# Patient Record
Sex: MALE | Race: WHITE | NOT HISPANIC OR LATINO | Employment: FULL TIME | URBAN - METROPOLITAN AREA
[De-identification: names, ages, dates, MRNs, and addresses within clinical notes are randomized per-mention and may not be internally consistent; named-entity substitution may affect disease eponyms.]

---

## 2017-02-02 ENCOUNTER — GENERIC CONVERSION - ENCOUNTER (OUTPATIENT)
Dept: OTHER | Facility: OTHER | Age: 57
End: 2017-02-02

## 2017-02-14 ENCOUNTER — GENERIC CONVERSION - ENCOUNTER (OUTPATIENT)
Dept: OTHER | Facility: OTHER | Age: 57
End: 2017-02-14

## 2017-03-15 ENCOUNTER — GENERIC CONVERSION - ENCOUNTER (OUTPATIENT)
Dept: OTHER | Facility: OTHER | Age: 57
End: 2017-03-15

## 2017-03-20 ENCOUNTER — ALLSCRIPTS OFFICE VISIT (OUTPATIENT)
Dept: OTHER | Facility: OTHER | Age: 57
End: 2017-03-20

## 2017-03-20 ENCOUNTER — ALLSCRIPTS OFFICE VISIT (OUTPATIENT)
Dept: RADIOLOGY | Facility: CLINIC | Age: 57
End: 2017-03-20
Payer: COMMERCIAL

## 2017-03-20 DIAGNOSIS — M54.41 LOW BACK PAIN WITH RIGHT-SIDED SCIATICA: ICD-10-CM

## 2017-03-20 DIAGNOSIS — G89.4 CHRONIC PAIN SYNDROME: ICD-10-CM

## 2017-03-20 DIAGNOSIS — I10 ESSENTIAL (PRIMARY) HYPERTENSION: ICD-10-CM

## 2017-03-20 DIAGNOSIS — M54.42 LOW BACK PAIN WITH LEFT-SIDED SCIATICA: ICD-10-CM

## 2017-03-20 PROCEDURE — 77003 FLUOROGUIDE FOR SPINE INJECT: CPT | Performed by: ANESTHESIOLOGY

## 2017-03-29 ENCOUNTER — GENERIC CONVERSION - ENCOUNTER (OUTPATIENT)
Dept: OTHER | Facility: OTHER | Age: 57
End: 2017-03-29

## 2017-03-31 ENCOUNTER — APPOINTMENT (OUTPATIENT)
Dept: LAB | Facility: CLINIC | Age: 57
End: 2017-03-31
Payer: COMMERCIAL

## 2017-03-31 DIAGNOSIS — G89.4 CHRONIC PAIN SYNDROME: ICD-10-CM

## 2017-03-31 DIAGNOSIS — I10 ESSENTIAL (PRIMARY) HYPERTENSION: ICD-10-CM

## 2017-03-31 LAB
ALBUMIN SERPL BCP-MCNC: 3.9 G/DL (ref 3.5–5)
ALP SERPL-CCNC: 65 U/L (ref 46–116)
ALT SERPL W P-5'-P-CCNC: 33 U/L (ref 12–78)
ANION GAP SERPL CALCULATED.3IONS-SCNC: 6 MMOL/L (ref 4–13)
AST SERPL W P-5'-P-CCNC: 17 U/L (ref 5–45)
BASOPHILS # BLD AUTO: 0.03 THOUSANDS/ΜL (ref 0–0.1)
BASOPHILS NFR BLD AUTO: 0 % (ref 0–1)
BILIRUB SERPL-MCNC: 0.83 MG/DL (ref 0.2–1)
BILIRUB UR QL STRIP: NEGATIVE
BUN SERPL-MCNC: 18 MG/DL (ref 5–25)
CALCIUM SERPL-MCNC: 9 MG/DL (ref 8.3–10.1)
CHLORIDE SERPL-SCNC: 103 MMOL/L (ref 100–108)
CHOLEST SERPL-MCNC: 126 MG/DL (ref 50–200)
CLARITY UR: CLEAR
CO2 SERPL-SCNC: 30 MMOL/L (ref 21–32)
COLOR UR: YELLOW
CREAT SERPL-MCNC: 1.05 MG/DL (ref 0.6–1.3)
EOSINOPHIL # BLD AUTO: 0.08 THOUSAND/ΜL (ref 0–0.61)
EOSINOPHIL NFR BLD AUTO: 1 % (ref 0–6)
ERYTHROCYTE [DISTWIDTH] IN BLOOD BY AUTOMATED COUNT: 13.6 % (ref 11.6–15.1)
GFR SERPL CREATININE-BSD FRML MDRD: >60 ML/MIN/1.73SQ M
GLUCOSE P FAST SERPL-MCNC: 81 MG/DL (ref 65–99)
GLUCOSE UR STRIP-MCNC: NEGATIVE MG/DL
HCT VFR BLD AUTO: 47.3 % (ref 36.5–49.3)
HDLC SERPL-MCNC: 38 MG/DL (ref 40–60)
HGB BLD-MCNC: 15.1 G/DL (ref 12–17)
HGB UR QL STRIP.AUTO: NEGATIVE
KETONES UR STRIP-MCNC: NEGATIVE MG/DL
LDLC SERPL CALC-MCNC: 57 MG/DL (ref 0–100)
LDLC SERPL DIRECT ASSAY-MCNC: 80 MG/DL (ref 0–100)
LEUKOCYTE ESTERASE UR QL STRIP: NEGATIVE
LYMPHOCYTES # BLD AUTO: 1.78 THOUSANDS/ΜL (ref 0.6–4.47)
LYMPHOCYTES NFR BLD AUTO: 24 % (ref 14–44)
MCH RBC QN AUTO: 29 PG (ref 26.8–34.3)
MCHC RBC AUTO-ENTMCNC: 31.9 G/DL (ref 31.4–37.4)
MCV RBC AUTO: 91 FL (ref 82–98)
MONOCYTES # BLD AUTO: 0.65 THOUSAND/ΜL (ref 0.17–1.22)
MONOCYTES NFR BLD AUTO: 9 % (ref 4–12)
NEUTROPHILS # BLD AUTO: 4.92 THOUSANDS/ΜL (ref 1.85–7.62)
NEUTS SEG NFR BLD AUTO: 66 % (ref 43–75)
NITRITE UR QL STRIP: NEGATIVE
NRBC BLD AUTO-RTO: 0 /100 WBCS
PH UR STRIP.AUTO: 6.5 [PH] (ref 4.5–8)
PLATELET # BLD AUTO: 221 THOUSANDS/UL (ref 149–390)
PMV BLD AUTO: 11.1 FL (ref 8.9–12.7)
POTASSIUM SERPL-SCNC: 4.4 MMOL/L (ref 3.5–5.3)
PROT SERPL-MCNC: 7.2 G/DL (ref 6.4–8.2)
PROT UR STRIP-MCNC: NEGATIVE MG/DL
RBC # BLD AUTO: 5.2 MILLION/UL (ref 3.88–5.62)
SODIUM SERPL-SCNC: 139 MMOL/L (ref 136–145)
SP GR UR STRIP.AUTO: 1.02 (ref 1–1.03)
TRIGL SERPL-MCNC: 153 MG/DL
TSH SERPL DL<=0.05 MIU/L-ACNC: 2.07 UIU/ML (ref 0.36–3.74)
UROBILINOGEN UR QL STRIP.AUTO: 0.2 E.U./DL
WBC # BLD AUTO: 7.47 THOUSAND/UL (ref 4.31–10.16)

## 2017-03-31 PROCEDURE — 84443 ASSAY THYROID STIM HORMONE: CPT

## 2017-03-31 PROCEDURE — 86617 LYME DISEASE ANTIBODY: CPT

## 2017-03-31 PROCEDURE — 85025 COMPLETE CBC W/AUTO DIFF WBC: CPT

## 2017-03-31 PROCEDURE — 81003 URINALYSIS AUTO W/O SCOPE: CPT

## 2017-03-31 PROCEDURE — 80053 COMPREHEN METABOLIC PANEL: CPT

## 2017-03-31 PROCEDURE — 83721 ASSAY OF BLOOD LIPOPROTEIN: CPT

## 2017-03-31 PROCEDURE — 36415 COLL VENOUS BLD VENIPUNCTURE: CPT

## 2017-03-31 PROCEDURE — 80061 LIPID PANEL: CPT

## 2017-04-04 ENCOUNTER — GENERIC CONVERSION - ENCOUNTER (OUTPATIENT)
Dept: OTHER | Facility: OTHER | Age: 57
End: 2017-04-04

## 2017-04-04 LAB
B BURGDOR IGG PATRN SER IB-IMP: NEGATIVE
B BURGDOR IGM PATRN SER IB-IMP: POSITIVE
B BURGDOR18KD IGG SER QL IB: ABNORMAL
B BURGDOR23KD IGG SER QL IB: ABNORMAL
B BURGDOR23KD IGM SER QL IB: PRESENT
B BURGDOR28KD IGG SER QL IB: ABNORMAL
B BURGDOR30KD IGG SER QL IB: ABNORMAL
B BURGDOR39KD IGG SER QL IB: ABNORMAL
B BURGDOR39KD IGM SER QL IB: ABNORMAL
B BURGDOR41KD IGG SER QL IB: PRESENT
B BURGDOR41KD IGM SER QL IB: PRESENT
B BURGDOR45KD IGG SER QL IB: ABNORMAL
B BURGDOR58KD IGG SER QL IB: ABNORMAL
B BURGDOR66KD IGG SER QL IB: ABNORMAL
B BURGDOR93KD IGG SER QL IB: ABNORMAL

## 2017-04-05 ENCOUNTER — GENERIC CONVERSION - ENCOUNTER (OUTPATIENT)
Dept: OTHER | Facility: OTHER | Age: 57
End: 2017-04-05

## 2017-04-10 ENCOUNTER — ALLSCRIPTS OFFICE VISIT (OUTPATIENT)
Dept: OTHER | Facility: OTHER | Age: 57
End: 2017-04-10

## 2017-06-22 ENCOUNTER — APPOINTMENT (OUTPATIENT)
Dept: RADIOLOGY | Facility: CLINIC | Age: 57
End: 2017-06-22
Payer: COMMERCIAL

## 2017-06-22 ENCOUNTER — ALLSCRIPTS OFFICE VISIT (OUTPATIENT)
Dept: OTHER | Facility: OTHER | Age: 57
End: 2017-06-22

## 2017-06-22 DIAGNOSIS — M70.71 OTHER BURSITIS OF HIP, RIGHT HIP: ICD-10-CM

## 2017-06-22 DIAGNOSIS — M16.11 PRIMARY OSTEOARTHRITIS OF RIGHT HIP: ICD-10-CM

## 2017-06-22 DIAGNOSIS — M53.3 SACROCOCCYGEAL DISORDERS, NOT ELSEWHERE CLASSIFIED: ICD-10-CM

## 2017-06-22 DIAGNOSIS — M70.61 TROCHANTERIC BURSITIS OF RIGHT HIP: ICD-10-CM

## 2017-06-22 DIAGNOSIS — M25.551 PAIN IN RIGHT HIP: ICD-10-CM

## 2017-06-22 PROCEDURE — 73502 X-RAY EXAM HIP UNI 2-3 VIEWS: CPT

## 2017-06-22 PROCEDURE — 72200 X-RAY EXAM SI JOINTS: CPT

## 2017-06-23 ENCOUNTER — GENERIC CONVERSION - ENCOUNTER (OUTPATIENT)
Dept: OTHER | Facility: OTHER | Age: 57
End: 2017-06-23

## 2017-06-27 ENCOUNTER — GENERIC CONVERSION - ENCOUNTER (OUTPATIENT)
Dept: OTHER | Facility: OTHER | Age: 57
End: 2017-06-27

## 2017-07-05 ENCOUNTER — GENERIC CONVERSION - ENCOUNTER (OUTPATIENT)
Dept: OTHER | Facility: OTHER | Age: 57
End: 2017-07-05

## 2017-07-10 ENCOUNTER — ALLSCRIPTS OFFICE VISIT (OUTPATIENT)
Dept: OTHER | Facility: OTHER | Age: 57
End: 2017-07-10

## 2017-07-14 ENCOUNTER — ALLSCRIPTS OFFICE VISIT (OUTPATIENT)
Dept: OTHER | Facility: OTHER | Age: 57
End: 2017-07-14

## 2017-07-18 ENCOUNTER — ALLSCRIPTS OFFICE VISIT (OUTPATIENT)
Dept: OTHER | Facility: OTHER | Age: 57
End: 2017-07-18

## 2017-07-19 ENCOUNTER — GENERIC CONVERSION - ENCOUNTER (OUTPATIENT)
Dept: OTHER | Facility: OTHER | Age: 57
End: 2017-07-19

## 2017-07-27 ENCOUNTER — ALLSCRIPTS OFFICE VISIT (OUTPATIENT)
Dept: RADIOLOGY | Facility: CLINIC | Age: 57
End: 2017-07-27
Payer: COMMERCIAL

## 2017-08-03 ENCOUNTER — GENERIC CONVERSION - ENCOUNTER (OUTPATIENT)
Dept: OTHER | Facility: OTHER | Age: 57
End: 2017-08-03

## 2017-08-07 ENCOUNTER — ALLSCRIPTS OFFICE VISIT (OUTPATIENT)
Dept: OTHER | Facility: OTHER | Age: 57
End: 2017-08-07

## 2017-08-07 DIAGNOSIS — R19.7 DIARRHEA: ICD-10-CM

## 2017-08-07 DIAGNOSIS — R10.11 RIGHT UPPER QUADRANT PAIN: ICD-10-CM

## 2017-08-07 DIAGNOSIS — M16.11 PRIMARY OSTEOARTHRITIS OF RIGHT HIP: ICD-10-CM

## 2017-08-11 ENCOUNTER — ALLSCRIPTS OFFICE VISIT (OUTPATIENT)
Dept: OTHER | Facility: OTHER | Age: 57
End: 2017-08-11

## 2017-08-11 ENCOUNTER — TRANSCRIBE ORDERS (OUTPATIENT)
Dept: ADMINISTRATIVE | Facility: HOSPITAL | Age: 57
End: 2017-08-11

## 2017-08-11 DIAGNOSIS — R10.11 ABDOMINAL PAIN, RIGHT UPPER QUADRANT: Primary | ICD-10-CM

## 2017-08-15 ENCOUNTER — APPOINTMENT (OUTPATIENT)
Dept: ULTRASOUND IMAGING | Facility: CLINIC | Age: 57
End: 2017-08-15
Payer: COMMERCIAL

## 2017-08-15 ENCOUNTER — TRANSCRIBE ORDERS (OUTPATIENT)
Dept: SLEEP CENTER | Facility: HOSPITAL | Age: 57
End: 2017-08-15

## 2017-08-15 ENCOUNTER — TRANSCRIBE ORDERS (OUTPATIENT)
Dept: ADMINISTRATIVE | Facility: HOSPITAL | Age: 57
End: 2017-08-15

## 2017-08-15 ENCOUNTER — HOSPITAL ENCOUNTER (OUTPATIENT)
Dept: ULTRASOUND IMAGING | Facility: CLINIC | Age: 57
Discharge: HOME | End: 2017-08-15
Payer: COMMERCIAL

## 2017-08-15 ENCOUNTER — APPOINTMENT (OUTPATIENT)
Dept: LAB | Facility: CLINIC | Age: 57
End: 2017-08-15
Payer: COMMERCIAL

## 2017-08-15 ENCOUNTER — LAB CONVERSION - ENCOUNTER (OUTPATIENT)
Dept: OTHER | Facility: OTHER | Age: 57
End: 2017-08-15

## 2017-08-15 ENCOUNTER — HOSPITAL ENCOUNTER (OUTPATIENT)
Dept: SLEEP CENTER | Facility: HOSPITAL | Age: 57
Discharge: HOME | End: 2017-08-15
Attending: INTERNAL MEDICINE
Payer: COMMERCIAL

## 2017-08-15 DIAGNOSIS — Z12.5 SPECIAL SCREENING FOR MALIGNANT NEOPLASM OF PROSTATE: ICD-10-CM

## 2017-08-15 DIAGNOSIS — N39.43 POST-VOID DRIBBLING: ICD-10-CM

## 2017-08-15 DIAGNOSIS — N39.43 POST-VOID DRIBBLING: Primary | ICD-10-CM

## 2017-08-15 DIAGNOSIS — R10.11 RIGHT UPPER QUADRANT PAIN: ICD-10-CM

## 2017-08-15 DIAGNOSIS — G47.33 OSA (OBSTRUCTIVE SLEEP APNEA): ICD-10-CM

## 2017-08-15 DIAGNOSIS — G47.33 OSA (OBSTRUCTIVE SLEEP APNEA): Primary | ICD-10-CM

## 2017-08-15 LAB — PSA SERPL-MCNC: 1.2 NG/ML (ref 0–4)

## 2017-08-15 PROCEDURE — 84153 ASSAY OF PSA TOTAL: CPT

## 2017-08-15 PROCEDURE — 76705 ECHO EXAM OF ABDOMEN: CPT

## 2017-08-21 ENCOUNTER — GENERIC CONVERSION - ENCOUNTER (OUTPATIENT)
Dept: OTHER | Facility: OTHER | Age: 57
End: 2017-08-21

## 2017-08-29 ENCOUNTER — GENERIC CONVERSION - ENCOUNTER (OUTPATIENT)
Dept: OTHER | Facility: OTHER | Age: 57
End: 2017-08-29

## 2017-08-30 ENCOUNTER — ALLSCRIPTS OFFICE VISIT (OUTPATIENT)
Dept: OTHER | Facility: OTHER | Age: 57
End: 2017-08-30

## 2017-09-18 ENCOUNTER — TRANSCRIBE ORDERS (OUTPATIENT)
Dept: ADMINISTRATIVE | Facility: HOSPITAL | Age: 57
End: 2017-09-18

## 2017-09-18 ENCOUNTER — HOSPITAL ENCOUNTER (OUTPATIENT)
Dept: NUCLEAR MEDICINE | Facility: HOSPITAL | Age: 57
Discharge: HOME | End: 2017-09-18
Attending: SURGERY
Payer: COMMERCIAL

## 2017-09-18 VITALS — WEIGHT: 196 LBS | BODY MASS INDEX: 30.7 KG/M2

## 2017-09-18 DIAGNOSIS — R10.11 ABDOMINAL PAIN, RIGHT UPPER QUADRANT: ICD-10-CM

## 2017-09-18 PROCEDURE — A9537 TC99M MEBROFENIN: HCPCS

## 2017-09-18 PROCEDURE — 78227 HEPATOBIL SYST IMAGE W/DRUG: CPT

## 2017-09-18 RX ADMIN — SINCALIDE 1.8 MCG: 5 INJECTION, POWDER, LYOPHILIZED, FOR SOLUTION INTRAVENOUS at 08:56

## 2017-09-30 ENCOUNTER — HOSPITAL ENCOUNTER (EMERGENCY)
Facility: HOSPITAL | Age: 57
Discharge: HOME | End: 2017-09-30
Attending: EMERGENCY MEDICINE
Payer: COMMERCIAL

## 2017-09-30 VITALS
HEART RATE: 78 BPM | BODY MASS INDEX: 30.01 KG/M2 | WEIGHT: 198 LBS | SYSTOLIC BLOOD PRESSURE: 168 MMHG | OXYGEN SATURATION: 98 % | HEIGHT: 68 IN | RESPIRATION RATE: 20 BRPM | TEMPERATURE: 98.2 F | DIASTOLIC BLOOD PRESSURE: 104 MMHG

## 2017-09-30 DIAGNOSIS — L29.9 PRURITUS: ICD-10-CM

## 2017-09-30 DIAGNOSIS — R22.0 FACIAL SWELLING: ICD-10-CM

## 2017-09-30 DIAGNOSIS — K04.7 DENTAL ABSCESS: Primary | ICD-10-CM

## 2017-09-30 PROCEDURE — 99283 EMERGENCY DEPT VISIT LOW MDM: CPT

## 2017-09-30 PROCEDURE — 96372 THER/PROPH/DIAG INJ SC/IM: CPT

## 2017-09-30 RX ORDER — DIPHENHYDRAMINE HCL 25 MG
50 TABLET ORAL EVERY 6 HOURS PRN
Qty: 20 TABLET | Refills: 0 | Status: SHIPPED | OUTPATIENT
Start: 2017-09-30 | End: 2017-12-04

## 2017-09-30 RX ORDER — DIPHENHYDRAMINE HCL 25 MG
50 TABLET ORAL ONCE
Status: COMPLETED | OUTPATIENT
Start: 2017-09-30 | End: 2017-09-30

## 2017-09-30 RX ORDER — DOXYCYCLINE HYCLATE 100 MG/1
100 CAPSULE ORAL 2 TIMES DAILY
Qty: 20 CAPSULE | Refills: 0 | Status: SHIPPED | OUTPATIENT
Start: 2017-09-30 | End: 2017-10-10

## 2017-09-30 RX ORDER — LISINOPRIL 10 MG/1
TABLET ORAL
COMMUNITY
Start: 2017-03-20 | End: 2017-12-04

## 2017-09-30 RX ORDER — DOXYCYCLINE HYCLATE 100 MG/1
100 CAPSULE ORAL ONCE
Status: COMPLETED | OUTPATIENT
Start: 2017-09-30 | End: 2017-09-30

## 2017-09-30 RX ADMIN — DOXYCYCLINE HYCLATE 100 MG: 100 CAPSULE ORAL at 08:50

## 2017-09-30 RX ADMIN — DIPHENHYDRAMINE HCL 50 MG: 25 TABLET ORAL at 08:50

## 2017-09-30 RX ADMIN — DEXAMETHASONE SODIUM PHOSPHATE 10 MG: 10 INJECTION, SOLUTION INTRAMUSCULAR; INTRAVENOUS at 08:51

## 2017-09-30 NOTE — DISCHARGE INSTRUCTIONS
Dental Abscess   WHAT YOU NEED TO KNOW:   A dental abscess is a collection of pus in or around a tooth  A dental abscess is caused by bacteria  The bacteria usually enter the tooth when the enamel (outer part of the tooth) is damaged by tooth decay  Bacteria may also enter the tooth through a break or chip in the tooth, or a cut in the gum  Food particles that are stuck between the teeth for a long time may also lead to an abscess  DISCHARGE INSTRUCTIONS:   Return to the emergency department if:   · You have severe pain  · You have trouble breathing because of pain or swelling  Contact your healthcare provider if:   · Your symptoms get worse, even after treatment  · Your mouth is bleeding  · You cannot eat or drink because of pain or swelling  · Your abscess returns  · You have an injury that causes a crack in your tooth  · You have questions or concerns about your condition or care  Medicines: You may  need any of the following:  · Antibiotics  help treat a bacterial infection  · NSAIDs , such as ibuprofen, help decrease swelling, pain, and fever  This medicine is available with or without a doctor's order  NSAIDs can cause stomach bleeding or kidney problems in certain people  If you take blood thinner medicine, always ask your healthcare provider if NSAIDs are safe for you  Always read the medicine label and follow directions  · Acetaminophen  decreases pain and fever  It is available without a doctor's order  Ask how much to take and how often to take it  Follow directions  Read the labels of all other medicines you are using to see if they also contain acetaminophen, or ask your doctor or pharmacist  Acetaminophen can cause liver damage if not taken correctly  Do not use more than 4 grams (4,000 milligrams) total of acetaminophen in one day  · Prescription pain medicine  may be given  Ask your healthcare provider how to take this medicine safely   Some prescription pain medicines contain acetaminophen  Do not take other medicines that contain acetaminophen without talking to your healthcare provider  Too much acetaminophen may cause liver damage  Prescription pain medicine may cause constipation  Ask your healthcare provider how to prevent or treat constipation  · Take your medicine as directed  Contact your healthcare provider if you think your medicine is not helping or if you have side effects  Tell him of her if you are allergic to any medicine  Keep a list of the medicines, vitamins, and herbs you take  Include the amounts, and when and why you take them  Bring the list or the pill bottles to follow-up visits  Carry your medicine list with you in case of an emergency  Self-care:   · Rinse your mouth every 2 hours with salt water  This will help keep the area clean  · Gently brush your teeth twice a day with a soft tooth brush  This will help keep the area clean  · Eat soft foods as directed  Soft foods may cause less pain  Examples include applesauce, yogurt, and cooked pasta  Ask your healthcare provider how long to follow this instruction  · Apply a warm compress to your tooth or gum  Use a cotton ball or gauze soaked in warm water  Remove the compress in 10 minutes or when it becomes cool  Repeat 3 times a day  Prevent another abscess:   · Brush your teeth at least 2 times a day with fluoride toothpaste  · Use dental floss to clean between your teeth at least once a day  · Rinse your mouth with water or mouthwash after meals and snacks  · Chew sugarless gum after meals and snacks  · Limit foods that are sticky and high in sugar such as raisons  Also limit drinks high in sugar, such as soda  · See your dentist every 6 months for dental cleanings and oral exams  Follow up with your healthcare provider in 24 hours: Your healthcare provider will need to check your teeth and gums   Write down your questions so you remember to ask them during your visits  © 2017 [address] Information is for End User's use only and may not be sold, redistributed or otherwise used for commercial purposes  All illustrations and images included in CareNotes® are the copyrighted property of [employer]  or [employer]  The above information is an  only  It is not intended as medical advice for individual conditions or treatments  Talk to your doctor, nurse or pharmacist before following any medical regimen to see if it is safe and effective for you  Itchy Skin   WHAT YOU NEED TO KNOW:   Itchy skin may interfere with your daily tasks and sleep  Treatment is important because constant scratching can damage your skin and increase your risk of infection  DISCHARGE INSTRUCTIONS:   Medicines:  · Medicines  may help decrease itching or inflammation  Skin creams, such as steroid creams or anti-itch creams may also help  · Take your medicine as directed  Contact your healthcare provider if you think your medicine is not helping or if you have side effects  Tell him or her if you are allergic to any medicine  Keep a list of the medicines, vitamins, and herbs you take  Include the amounts, and when and why you take them  Bring the list or the pill bottles to follow-up visits  Carry your medicine list with you in case of an emergency  Follow up with your healthcare provider as directed:  Write down your questions so you remember to ask them during your visits  Manage itchy skin:   · Take short showers in warm water  Avoid using hot water for your showers  Use only a small amount of mild skin cleanser  · Apply moisturizer or cooling creams  after you bathe and throughout the day  · Use a cool mist humidifier  to moisten the air in your home and maintain a cool temperature  Cool, humid air can decrease skin dryness and itching  · Avoid allergens and skin irritants    Do not use perfume, fabric softener, or makeup that irritates your skin  Use a mild detergent to wash your clothes  Wear loose cotton clothes and use cotton sheets  Avoid wool  Contact your healthcare provider if:   · Your itching does not improve or gets worse  · Scratching has caused your skin to be red or swollen  · You have new symptoms such as weight loss, fatigue, changes in urination, or fever  · You have questions or concerns about your condition or care  © 2017 [address] [facility] Information is for End User's use only and may not be sold, redistributed or otherwise used for commercial purposes  All illustrations and images included in CareNotes® are the copyrighted property of [employer] M [employer]  or [employer]  The above information is an  only  It is not intended as medical advice for individual conditions or treatments  Talk to your doctor, nurse or pharmacist before following any medical regimen to see if it is safe and effective for you  General Allergic Reaction, Ambulatory Care   GENERAL INFORMATION:   A general allergic reaction  is your body's response to an allergen  Allergens include medicines, food, insect stings, animal dander, mold, latex, chemicals, and dust mites  Pollen from trees, grass, and weeds can also cause an allergic reaction  An allergic reaction can cause one or more symptoms  Seek immediate care for the following symptoms:   A skin rash, hives, swelling, or itching that gets worse    Trouble breathing, shortness of breath, wheezing, or coughing    Tightened or swollen throat, or your lips or tongue swell    Trouble swallowing or speaking    Dizziness, lightheadedness, fainting, or confusion    Nausea, vomiting, diarrhea, or abdominal cramps    Chest pain or tightness  Treatment for a general allergic reaction  may include medicines to relieve certain allergy symptoms such as itching, sneezing, and swelling  You may take them as a pill or use drops in your nose or eyes  Topical treatments may be given to put directly on your skin to help decrease itching or swelling  Manage allergic reactions:   Avoid the allergen  that you think may have caused your allergic reaction  Use cold compresses  on your skin or eyes if they were affected by the allergic reaction  Cold compresses may help to soothe your skin or eyes  Rinse your nasal passages  with a saline solution  Daily rinsing may help clear your nose of allergens  Do not smoke  Your allergy symptoms may decrease if you are not around smoke  If you smoke, it is never too late to quit  Ask your healthcare provider for information about how to stop if you need help quitting  Follow up with your healthcare provider as directed:  Write down your questions so you remember to ask them during your visits  CARE AGREEMENT:   You have the right to help plan your care  Learn about your health condition and how it may be treated  Discuss treatment options with your caregivers to decide what care you want to receive  You always have the right to refuse treatment  The above information is an  only  It is not intended as medical advice for individual conditions or treatments  Talk to your doctor, nurse or pharmacist before following any medical regimen to see if it is safe and effective for you  © 2014 [address] is for End User's use only and may not be sold, redistributed or otherwise used for commercial purposes  All illustrations and images included in CareNotes® are the copyrighted property of [employer]  or [employer]

## 2017-09-30 NOTE — ED PROVIDER NOTES
History  Chief Complaint   Patient presents with    Facial Swelling     Presents to ED with c/o right sided facial swelling since last night with associated "itching all over"  Denies any SOB, lip or tongue swelling  Denies any new foods or products  States that he is having dental work done to right upper area  This is a 79-year-old male who presents with swelling to the right facial area that started last evening he has been having some sensitivity and pain in the right upper dental area for the past few weeks he was seen by a dentist and told that he may have the start of an abscess and was referred to an endodontist but was not started on any antibiotic  He also states that he had some swelling to the left lower lip area last evening after biting into a vitamin  Denies any difficulty swallowing no shortness of breath nausea vomiting  He also has some itchiness under his arms and in the elbow areas  He has a previous history of Lyme disease he had a red spot in the  Right wrist area last evening that he thinks may have been an insect bite but it is resolved now at this time  He also had some chest pain yesterday briefly after swallowing a pill he does not have any chest pain at this time  He is currently on lisinopril but has not taken that medication today or yesterday  No previous angioedema episodes  History provided by:  Patient  Dental Pain   Location:  Upper  Upper teeth location:  2/RU 2nd molar  Quality:  Aching  Severity:  Moderate  Onset quality:  Gradual  Progression:  Waxing and waning  Chronicity:  Recurrent  Context: dental caries    Previous work-up:  Dental exam  Worsened by:  Cold food/drink  Associated symptoms: facial swelling    Associated symptoms: no difficulty swallowing        Prior to Admission Medications   Prescriptions Last Dose Informant Patient Reported?  Taking?   diclofenac sodium (VOLTAREN) 1 %   Yes No   Sig: Voltaren 1 % Transdermal Gel  APPLY TO RIGHT ELBOW, 2 GM OF GEL TO AFFECTED AREA 4 TIMES DAILY  DO NOT APPLY MORE THAN 8 GM DAILY TO ANY ONE AFFECTED JOINT  Quantity: 1;  Refills: 0       Georgiana Jose DO;  Started 27-Feb-2016  [id number] GM Tube   lisinopril (ZESTRIL) 10 mg tablet   Yes Yes   Sig: Take by mouth   methocarbamol (ROBAXIN) 750 mg tablet   No No   Sig: Take 1 tablet by mouth 4 (four) times a day as needed for muscle spasms   mometasone (NASONEX) 50 mcg/act nasal spray   Yes No   Sig: Nasonex 50 MCG/ACT Nasal Suspension  USE 2 SPRAYS IN EACH NOSTRIL ONCE DAILY   Quantity: 1;  Refills: 5       Georgiana Jose DO;  Started 9-Apr-2015  Trude Cords GM Inhaler   pantoprazole (PROTONIX) 40 mg tablet   Yes No   Sig: Protonix 40 MG Oral Tablet Delayed Release  Take 1 tablet daily   Quantity: 0;  Refills: 0       Elsie Carrillo ; Active      Facility-Administered Medications: None       Past Medical History:   Diagnosis Date    Chronic pain     GERD (gastroesophageal reflux disease)     Hypertension        History reviewed  No pertinent surgical history  History reviewed  No pertinent family history  I have reviewed and agree with the history as documented  Social History   Substance Use Topics    Smoking status: Never Smoker    Smokeless tobacco: Former User    Alcohol use No        Review of Systems   HENT: Positive for dental problem and facial swelling  Skin:        Itchiness       Physical Exam  ED Triage Vitals [09/30/17 0818]   Temperature Pulse Respirations Blood Pressure SpO2   98 2 °F (36 8 °C) 68 20 (!) 161/104 98 %      Temp Source Heart Rate Source Patient Position - Orthostatic VS BP Location FiO2 (%)   Tympanic Monitor Sitting Right arm --      Pain Score       No Pain           Physical Exam   Constitutional: He is oriented to person, place, and time  He appears well-developed and well-nourished  No distress     HENT:   Right Ear: External ear normal    Left Ear: External ear normal    Nose: Nose normal    Swelling over the right cheek area and tenderness to the right upper dental area with palpation no obvious fluctuant area there is some dental caries no tongue swelling or uvular swelling no stridor   Eyes: Conjunctivae and EOM are normal  Pupils are equal, round, and reactive to light  Neck: Normal range of motion  Neck supple  No tracheal deviation present  Cardiovascular: Normal rate, regular rhythm and intact distal pulses  Exam reveals no friction rub  No murmur heard  Pulmonary/Chest: Effort normal and breath sounds normal  No respiratory distress  He has no wheezes  He has no rales  Abdominal: Soft  Bowel sounds are normal  He exhibits no distension  There is no tenderness  There is no guarding  Musculoskeletal: Normal range of motion  He exhibits no edema  Neurological: He is alert and oriented to person, place, and time  No cranial nerve deficit  Skin: Skin is warm and dry  No rash noted  Psychiatric: He has a normal mood and affect  His behavior is normal  Thought content normal    Nursing note and vitals reviewed  ED Medications  Medications   dexamethasone (DECADRON) injection 10 mg (10 mg Intramuscular Given 9/30/17 0851)   diphenhydrAMINE (BENADRYL) tablet 50 mg (50 mg Oral Given 9/30/17 0850)   doxycycline hyclate (VIBRAMYCIN) capsule 100 mg (100 mg Oral Given 9/30/17 0850)       Diagnostic Studies  Labs Reviewed - No data to display    No orders to display       Procedures  Procedures      Phone Contacts  ED Phone Contact    ED Course  ED Course                                MDM  Number of Diagnoses or Management Options  Dental abscess:   Facial swelling:   Pruritus:   Diagnosis management comments:  Facial swelling seems clinically more likely dental abscess  Also generalized pruritus may be allergic reaction to a determine entity will treat with steroids and Benadryl   Also currently on lisinopril although not consistent with angioedema I did recommend that he follows up with his family doctor in 2 days to get recheck    CritCare Time    Disposition  Final diagnoses:   Dental abscess   Facial swelling   Pruritus     ED Disposition     ED Disposition Condition Comment    Discharge  [address] discharge to home/self care  Condition at discharge: Stable        Follow-up Information     Follow up With Specialties Details Why Contact Info    June Dance, DO Family Medicine In 2 days  [facility] ([id number])  [facility]  [phone]          also with oral surgeon as previously directed         Patient's Medications   Discharge Prescriptions    DIPHENHYDRAMINE (BENADRYL) 25 MG TABLET    Take 2 tablets by mouth every 6 (six) hours as needed for itching       Start Date: 9/30/2017 End Date: --       Order Dose: 50 mg       Quantity: 20 tablet    Refills: 0    DOXYCYCLINE HYCLATE (VIBRAMYCIN) 100 MG CAPSULE    Take 1 capsule by mouth 2 (two) times a day for 10 days       Start Date: 9/30/2017 End Date: 10/10/2017       Order Dose: 100 mg       Quantity: 20 capsule    Refills: 0     No discharge procedures on file      ED Provider  Electronically Signed by       Yousif Gordon DO  09/30/17 7963

## 2017-09-30 NOTE — ED NOTES
Patient reports hives and rash on left wrist from the hospital band  Small red area noted  Pt states that his itching is worse  Denies any SOB       Chetna Walker RN  09/30/17 4870

## 2017-09-30 NOTE — ED NOTES
No Rash noted to areas that patient states are itching  No redness or warmth noted to right face  Right upper gum line red and swollen, no drainage noted  Bridge work intact       Amol Aguero RN  09/30/17 1417

## 2017-10-03 ENCOUNTER — GENERIC CONVERSION - ENCOUNTER (OUTPATIENT)
Dept: OTHER | Facility: OTHER | Age: 57
End: 2017-10-03

## 2017-10-05 ENCOUNTER — ALLSCRIPTS OFFICE VISIT (OUTPATIENT)
Dept: OTHER | Facility: OTHER | Age: 57
End: 2017-10-05

## 2017-10-06 DIAGNOSIS — Z11.59 ENCOUNTER FOR SCREENING FOR OTHER VIRAL DISEASES (CODE): ICD-10-CM

## 2017-10-06 NOTE — PROGRESS NOTES
Assessment  1  Allergic angioedema, initial encounter (995 1) (T78 3XXA)   2  Essential hypertension (401 9) (I10)    Plan  Essential hypertension    · AmLODIPine Besylate 5 MG Oral Tablet; Take 1 tablet daily   · Follow-up visit in 3 weeks Evaluation and Treatment  Follow-up  Status: Complete  Done:  [id number]    Discussion/Summary    I believe that Cooper's symptoms were related to angioedema from the lisinopril  We will keep him off of this medication  I am going to start him on amlodipine to treat his hypertension  He will monitor his blood pressure at home will call with readings consistently greater than 140/90 or less than 100/70  He will follow up with his allergist as scheduled next week  We will see him back as scheduled  He will call or follow up in the emergency room with any worsening shortness of breath or swelling  Chief Complaint  ER follow up for facial swelling and welts on waist line and buttocks  The patient is here for an emergency room follow-up  History of Present Illness  Ramos Morris is a 61-year-old male who presents today for follow-up from an emergency room visit on September 30, 2017  He presented there initially with swelling of the right facial area as well as sensitivity and pain in the right upper dental area for few weeks  Who was also having swelling of the lower lip area  There was no chest pain or shortness of breath  It was felt that the facial swelling was more likely related to a dental abscess  He was also having generalized pruritus and hives which they treated with steroids and Benadryl  They did not feel his symptoms were consistent with angioedema but they did recommend that he follow up here  reports the day before he had some esophageal pain in the midsternal area  He developed the lip swelling and facial swelling after eating a gummy vitamin  swelling did go down with the prednisone and the antibiotic   He had stopped the lisinopril before going to the ER but did restart the Lisinopril and within a day or 2 he developed hives again  He did see his dentist and they pulled a tooth and he did see GI and they increase his to Protonix 40 mg BID  He is on clindamycin for the dental infection  GI is doing a repeat EGD next week  last episode of hive was on 10/02/2017 after eating peaches and yogurt  He restarted the Claritin  He stopped the lisinopril since 10/01/2017  seems to be triggered by food  patient denies any chest pain, shortness of breath, or palpitations  There is no edema  There are no headaches or visual changes  There is no lightheadedness, dizziness, or fainting spells  is no throat swelling and there is no shortness of breath  is seeing his allergist next week  is watching his diet and exercise  The patient presents for follow-up of essential hypertension  The patient states he has been doing well with his blood pressure control since the last visit  He has no comorbid illnesses  Symptoms: denies impaired vision,-denies dyspnea,-denies chest pain,-denies intermittent leg claudication,-denies lower extremity edema-and-denies   Associated symptoms include no headache,-no focal neurologic deficits-and-no memory loss  Home monitoring: The patient checks his blood pressure sporadically  Blood pressure control has been poor  Medications: the patient is adherent with his medication regimen -He denies medication side effects  Review of Systems    Constitutional: No fever or chills, feels well, no tiredness, no recent weight gain or weight loss  Eyes: No complaints of eye pain, no red eyes, no discharge from eyes, no itchy eyes  ENT: no complaints of earache, no hearing loss, no nosebleeds, no nasal discharge, no sore throat, no hoarseness  Cardiovascular: No complaints of slow heart rate, no fast heart rate, no chest pain, no palpitations, no leg claudication, no lower extremity     Respiratory: No complaints of shortness of breath, no wheezing, no cough, no SOB on exertion, no orthopnea or PND  Gastrointestinal: No complaints of abdominal pain, no constipation, no nausea or vomiting, no diarrhea or bloody stools  Genitourinary: No complaints of dysuria, no incontinence, no hesitancy, no nocturia, no genital lesion, no testicular pain  Musculoskeletal: No complaints of arthralgia, no myalgias, no joint swelling or stiffness, no limb pain or swelling  Integumentary: No complaints of skin rash or skin lesions, no itching, no skin wound, no dry skin  Neurological: No compliants of headache, no confusion, no convulsions, no numbness or tingling, no dizziness or fainting, no limb weakness, no difficulty walking  Psychiatric: Is not suicidal, no sleep disturbances, no anxiety or depression, no change in personality, no emotional problems  Endocrine: No complaints of proptosis, no hot flashes, no muscle weakness, no erectile dysfunction, no deepening of the voice, no feelings of weakness  Hematologic/Lymphatic: No complaints of swollen glands, no swollen glands in the neck, does not bleed easily, no easy bruising  Active Problems  1  Abdominal pain, RUQ (789 01) (R10 11)   2  Allergic rhinitis (477 9) (J30 9)   3  Cervical pain (723 1) (M54 2)   4  Chronic bilateral low back pain with bilateral sciatica (724 2,724 3,338 29)   (M54 42,M54 41,G89 29)   5  Chronic myofascial pain (729 1,338 29) (M79 1,G89 29)   6  Degenerative joint disease of knee, right (715 96) (M17 9)   7  Diarrhea (787 91) (R19 7)   8  Encounter for screening colonoscopy (V76 51) (Z12 11)   9  Enlarged lymph node in neck (785 6) (R59 0)   10  Esophageal reflux (530 81) (K21 9)   11  Essential hypertension (401 9) (I10)   12  Greater trochanteric bursitis of right hip (726 5) (M70 61)   13  Hemangioma of skin (228 01) (D18 01)   14  Herniated lumbar intervertebral disc (722 10) (M51 26)   15  Lumbar spondylolysis (738 4) (M43 06)   16   Multiple pigmented nevi (216 9) (D22 9) 17  Obstructive sleep apnea syndrome (327 23) (G47 33)   18  Overweight (278 02) (E66 3)   19  Pain syndrome, chronic (338 4) (G89 4)   20  Primary localized osteoarthritis of right hip (715 15) (M16 11)   21  Primary localized osteoarthritis of right knee (715 16) (M17 11)   22  Rectus diastasis (728 84) (M62 08)   23  Sebaceous cyst (706 2) (L72 3)   24  Umbilical hernia (042 3) (K42 9)   25  Umbilical hernia without obstruction or gangrene (553 1) (K42 9)    Past Medical History  1  History of Abdominal pain, RLQ (right lower quadrant) (789 03) (R10 31)   2  History of Acute bronchitis (466 0) (J20 9)   3  History of Acute maxillary sinusitis (461 0) (J01 00)   4  History of Dysfunction of both eustachian tubes (381 81) (H69 83)   5  History of Epicondylitis, lateral, right (726 32) (M77 11)   6  History of Esophagitis, reflux (530 11) (K21 0)   7  History of acute pharyngitis (V12 69) (Z87 09)   8  History of acute sinusitis (V12 69) (Z87 09)   9  History of chronic sinusitis (V12 69) (Z87 09)   10  History of Lyme disease (V12 09) (Z86 19)   11  History of paronychia of finger (V13 3) (Z87 2)   12  History of squamous cell carcinoma of skin (V10 83) (Z85 828)   13  History of upper respiratory infection (V12 09) (Z87 09)   14  History of viral warts (V12 09) (Z86 19)   15  History of Ingrowing toenail (703 0) (L60 0)   16  History of Lump of skin (782 2) (R22 9)   17  History of Neck Strain (847 0)   18  History of Pain of right sacroiliac joint (724 6) (M53 3)   19  History of Paronychia Of The Left First Toe (681 11)   20  History of Recurrent sinusitis (473 9) (J32 9)   21  History of Swelling of right knee joint (719 06) (M25 461)   22  History of Viral URI (465 9) (J06 9,B97 89)    The active problems and past medical history were reviewed and updated today  Surgical History  1  History of Hernia Repair   2  History of Knee Surgery   3   History of Knee Surgery    The surgical history was reviewed and updated today  Family History  Mother    1  Family history of malignant neoplasm (V16 9) (Z80 9)  Family History    2  Family history of Back Pain   3  Family history of Cancer   4  Family history of Hypertension (V17 49)    The family history was reviewed and updated today  Social History   · Alcohol Use (History)   · Always uses seat belt   · Currently working   ·    · Never A Smoker   · No drug use   · Occasional alcohol use   · Occupation   · Two children  The social history was reviewed and updated today  The social history was reviewed and is unchanged  Current Meds   1  Ciprofloxacin HCl TABS; patient reports taking a 300mg tablet every 6 hours daily for   dental produre; Therapy: (Recorded:05Oct2017) to Recorded   2  Claritin 10 MG Oral Capsule; Take one tablet daily; Therapy: 05WDQ9500 to (Jonna Apple) Recorded   3  Nasonex 50 MCG/ACT Nasal Suspension; USE 2 SPRAYS IN EACH NOSTRIL ONCE   DAILY; Therapy: 09Apr2015 to (Last Rx:20Oct2015)  Requested for: 20Oct2015 Ordered    Allergies  1  Lisinopril TABS   2  Penicillins    Vitals  Vital Signs    Recorded: 85HDH9975 10:27AM   Temperature 98 3 F, Tympanic   Heart Rate 80   Respiration 17   Systolic 375, RUE, Sitting   Diastolic 80, RUE, Sitting   Height 5 ft 8 in   Weight 194 lb    BMI Calculated 29 5   BSA Calculated 2 02     Physical Exam    Constitutional   General appearance: No acute distress, well appearing and well nourished  Ears, Nose, Mouth, and Throat   External inspection of ears and nose: Normal     Otoscopic examination: Tympanic membrance translucent with normal light reflex  Canals patent without erythema  Nasal mucosa, septum, and turbinates: Normal without edema or erythema  Oropharynx: Normal with no erythema, edema, exudate or lesions  Pulmonary   Respiratory effort: No increased work of breathing or signs of respiratory distress      Auscultation of lungs: Clear to auscultation, equal breath sounds bilaterally, no wheezes, no rales, no rhonci  Cardiovascular   Palpation of heart: Normal PMI, no thrills  Auscultation of heart: Normal rate and rhythm, normal S1 and S2, without murmurs  Examination of extremities for edema and/or varicosities: Normal     Carotid pulses: Normal     Abdomen   Abdomen: Non-tender, no masses  Liver and spleen: No hepatomegaly or splenomegaly  Musculoskeletal   Gait and station: Normal     Digits and nails: Normal without clubbing or cyanosis  Inspection/palpation of joints, bones, and muscles: Normal     Skin   Skin and subcutaneous tissue: Abnormal  -There is mild swelling over the right cheek  Health Management  History of Colon cancer screening   COLONOSCOPY; every 5 years; Last [id number];  Next Due: 48Aof9388; Near Due    Future Appointments    Date/Time Provider Specialty Site   10/18/2017 11:15 AM Heather Cohen MD Orthopedic Surgery [facility] INPATIENT REHABILITATION [facility] [address]   10/25/2017 01:00 PM Rocio Herron DO Family [facility]   10/30/2017 10:30 AM Rocio Herron DO Family Medicine [facility] [place], operated by [facility]     Signatures   Electronically signed by : Lexi Wade DO; Oct  5 2017  2:28PM EST                       (Author)

## 2017-10-09 ENCOUNTER — GENERIC CONVERSION - ENCOUNTER (OUTPATIENT)
Dept: OTHER | Facility: OTHER | Age: 57
End: 2017-10-09

## 2017-10-10 ENCOUNTER — GENERIC CONVERSION - ENCOUNTER (OUTPATIENT)
Dept: OTHER | Facility: OTHER | Age: 57
End: 2017-10-10

## 2017-10-14 ENCOUNTER — GENERIC CONVERSION - ENCOUNTER (OUTPATIENT)
Dept: OTHER | Facility: OTHER | Age: 57
End: 2017-10-14

## 2017-10-16 ENCOUNTER — APPOINTMENT (OUTPATIENT)
Dept: LAB | Facility: CLINIC | Age: 57
End: 2017-10-16
Payer: COMMERCIAL

## 2017-10-16 ENCOUNTER — GENERIC CONVERSION - ENCOUNTER (OUTPATIENT)
Dept: OTHER | Facility: OTHER | Age: 57
End: 2017-10-16

## 2017-10-16 ENCOUNTER — TRANSCRIBE ORDERS (OUTPATIENT)
Dept: ADMINISTRATIVE | Facility: HOSPITAL | Age: 57
End: 2017-10-16

## 2017-10-16 DIAGNOSIS — T78.3XXD CYCLIC EDEMA, SUBSEQUENT ENCOUNTER: ICD-10-CM

## 2017-10-16 DIAGNOSIS — T78.3XXD CYCLIC EDEMA, SUBSEQUENT ENCOUNTER: Primary | ICD-10-CM

## 2017-10-16 LAB
ALBUMIN SERPL BCP-MCNC: 4 G/DL (ref 3.5–5)
ALP SERPL-CCNC: 85 U/L (ref 46–116)
ALT SERPL W P-5'-P-CCNC: 45 U/L (ref 12–78)
ANION GAP SERPL CALCULATED.3IONS-SCNC: 7 MMOL/L (ref 4–13)
AST SERPL W P-5'-P-CCNC: 25 U/L (ref 5–45)
BASOPHILS # BLD AUTO: 0.03 THOUSANDS/ΜL (ref 0–0.1)
BASOPHILS NFR BLD AUTO: 0 % (ref 0–1)
BILIRUB SERPL-MCNC: 0.34 MG/DL (ref 0.2–1)
BUN SERPL-MCNC: 13 MG/DL (ref 5–25)
C3 SERPL-MCNC: 120 MG/DL (ref 90–180)
C4 SERPL-MCNC: 25 MG/DL (ref 10–40)
CALCIUM SERPL-MCNC: 8.4 MG/DL (ref 8.3–10.1)
CHLORIDE SERPL-SCNC: 107 MMOL/L (ref 100–108)
CO2 SERPL-SCNC: 26 MMOL/L (ref 21–32)
CREAT SERPL-MCNC: 1.07 MG/DL (ref 0.6–1.3)
EOSINOPHIL # BLD AUTO: 0.13 THOUSAND/ΜL (ref 0–0.61)
EOSINOPHIL NFR BLD AUTO: 2 % (ref 0–6)
ERYTHROCYTE [DISTWIDTH] IN BLOOD BY AUTOMATED COUNT: 13.1 % (ref 11.6–15.1)
ERYTHROCYTE [SEDIMENTATION RATE] IN BLOOD: 4 MM/HOUR (ref 0–10)
GFR SERPL CREATININE-BSD FRML MDRD: 78 ML/MIN/1.73SQ M
GLUCOSE SERPL-MCNC: 103 MG/DL (ref 65–140)
HCT VFR BLD AUTO: 45 % (ref 36.5–49.3)
HGB BLD-MCNC: 14.8 G/DL (ref 12–17)
LYMPHOCYTES # BLD AUTO: 1.54 THOUSANDS/ΜL (ref 0.6–4.47)
LYMPHOCYTES NFR BLD AUTO: 23 % (ref 14–44)
MCH RBC QN AUTO: 29.4 PG (ref 26.8–34.3)
MCHC RBC AUTO-ENTMCNC: 32.9 G/DL (ref 31.4–37.4)
MCV RBC AUTO: 90 FL (ref 82–98)
MONOCYTES # BLD AUTO: 0.59 THOUSAND/ΜL (ref 0.17–1.22)
MONOCYTES NFR BLD AUTO: 9 % (ref 4–12)
NEUTROPHILS # BLD AUTO: 4.37 THOUSANDS/ΜL (ref 1.85–7.62)
NEUTS SEG NFR BLD AUTO: 66 % (ref 43–75)
NRBC BLD AUTO-RTO: 0 /100 WBCS
PLATELET # BLD AUTO: 227 THOUSANDS/UL (ref 149–390)
PMV BLD AUTO: 12.1 FL (ref 8.9–12.7)
POTASSIUM SERPL-SCNC: 3.9 MMOL/L (ref 3.5–5.3)
PROT SERPL-MCNC: 7.4 G/DL (ref 6.4–8.2)
RBC # BLD AUTO: 5.03 MILLION/UL (ref 3.88–5.62)
SODIUM SERPL-SCNC: 140 MMOL/L (ref 136–145)
WBC # BLD AUTO: 6.67 THOUSAND/UL (ref 4.31–10.16)

## 2017-10-16 PROCEDURE — 86161 COMPLEMENT/FUNCTION ACTIVITY: CPT

## 2017-10-16 PROCEDURE — 85025 COMPLETE CBC W/AUTO DIFF WBC: CPT

## 2017-10-16 PROCEDURE — 86160 COMPLEMENT ANTIGEN: CPT

## 2017-10-16 PROCEDURE — 84165 PROTEIN E-PHORESIS SERUM: CPT

## 2017-10-16 PROCEDURE — 83520 IMMUNOASSAY QUANT NOS NONAB: CPT

## 2017-10-16 PROCEDURE — 85652 RBC SED RATE AUTOMATED: CPT

## 2017-10-16 PROCEDURE — 80053 COMPREHEN METABOLIC PANEL: CPT

## 2017-10-18 ENCOUNTER — ALLSCRIPTS OFFICE VISIT (OUTPATIENT)
Dept: OTHER | Facility: OTHER | Age: 57
End: 2017-10-18

## 2017-10-19 LAB
ALBUMIN SERPL ELPH-MCNC: 4.53 G/DL (ref 3.5–5)
ALBUMIN SERPL ELPH-MCNC: 63.8 % (ref 52–65)
ALPHA1 GLOB SERPL ELPH-MCNC: 0.31 G/DL (ref 0.1–0.4)
ALPHA1 GLOB SERPL ELPH-MCNC: 4.3 % (ref 2.5–5)
ALPHA2 GLOB SERPL ELPH-MCNC: 0.62 G/DL (ref 0.4–1.2)
ALPHA2 GLOB SERPL ELPH-MCNC: 8.7 % (ref 7–13)
BETA GLOB ABNORMAL SERPL ELPH-MCNC: 0.45 G/DL (ref 0.4–0.8)
BETA1 GLOB SERPL ELPH-MCNC: 6.3 % (ref 5–13)
BETA2 GLOB SERPL ELPH-MCNC: 5.5 % (ref 2–8)
BETA2+GAMMA GLOB SERPL ELPH-MCNC: 0.39 G/DL (ref 0.2–0.5)
C1INH SERPL-MCNC: 30 MG/DL (ref 21–39)
GAMMA GLOB ABNORMAL SERPL ELPH-MCNC: 0.81 G/DL (ref 0.5–1.6)
GAMMA GLOB SERPL ELPH-MCNC: 11.4 % (ref 12–22)
IGG/ALB SER: 1.76 {RATIO} (ref 1.1–1.8)
LABORATORY COMMENT REPORT: NORMAL
PROT PATTERN SERPL ELPH-IMP: ABNORMAL
PROT SERPL-MCNC: 7.1 G/DL (ref 6.4–8.2)

## 2017-10-19 NOTE — PROGRESS NOTES
Assessment  1  Primary localized osteoarthritis of right hip (715 15) (M16 11)   2  Primary localized osteoarthritis of right knee (715 16) (M17 11)   3  Greater trochanteric bursitis of right hip (726 5) (M70 61)    Plan  Greater trochanteric bursitis of right hip, Primary localized osteoarthritis of right hip,  Primary localized osteoarthritis of right knee    · Follow-up PRN Evaluation and Treatment  Follow-up  Status: Complete  Done:  [id number]    Discussion/Summary    Findings consistent with right hip osteoarthritis, improved and greater trochanteric bursitis - resolved  Discussed findings and treatment options with the patient  Recommend finish physical therapy and low-impact exercises  As far as his hip is concerned, as long as he continued to have good pain relief I would continue to observe his symptoms  He might have to have repeat injection in the future if his symptoms worsen  All patient's questions were answered to his satisfaction  This note is dictated using [employer] software  It may contains topographical errors, grammatical errors, improperly dictated words, background noise and other errors  Chief Complaint  Right hip pain      History of Present Illness  HPI: 53-year-old white male following up for right greater trochanteric bursitis and right hip osteoarthritis  He is doing well with his right hip  He was golfing all a few days ago and has no issue  He does occasionally get pain in the groin area and nighttime  He is not able to take NSAID due to allergic issue to Advil  He also had increase pain over the outer aspect of his knee after golfing  Pain did not start until after his gold rounds  He denies locking or given away symptoms  He is ambulating without assist device  Review of Systems    Constitutional: No fever or chills, feels well, no tiredness, no recent weight loss or weight gain  Eyes: No complaints of red eyes, no eyesight problems     ENT: no complaints of loss of hearing, no nosebleeds, no sore throat  Cardiovascular: No complaints of chest pain, no palpitations, no leg claudication or lower extremity edema  Respiratory: No complaints of shortness of breath, no wheezing, no cough  Gastrointestinal: No complaints of abdominal pain, no constipation, no nausea or vomiting, no diarrhea or bloody stools  Genitourinary: No complaints of dysuria or incontinence, no hesitancy, no nocturia  Musculoskeletal: as noted in HPI  Integumentary: No complaints of skin rash or lesion, no itching or dry skin, no skin wounds  Neurological: No complaints of headache, no confusion, no numbness or tingling, no dizziness  Psychiatric: No suicidal thoughts, no anxiety, no depression  Endocrine: No muscle weakness, no frequent urination, no excessive thirst, no feelings of weakness  ROS reviewed  Active Problems  1  Abdominal pain, RUQ (789 01) (R10 11)   2  Allergic angioedema, initial encounter (995 1) (T78 3XXA)   3  Allergic rhinitis (477 9) (J30 9)   4  Blood tests for routine general physical examination (V72 62) (Z00 00)   5  Cervical pain (723 1) (M54 2)   6  Chronic bilateral low back pain with bilateral sciatica (724 2,724 3,338 29)   (M54 42,M54 41,G89 29)   7  Chronic myofascial pain (729 1,338 29) (M79 1,G89 29)   8  Diarrhea (787 91) (R19 7)   9  Encounter for screening colonoscopy (V76 51) (Z12 11)   10  Enlarged lymph node in neck (785 6) (R59 0)   11  Esophageal reflux (530 81) (K21 9)   12  Essential hypertension (401 9) (I10)   13  Greater trochanteric bursitis of right hip (726 5) (M70 61)   14  Hemangioma of skin (228 01) (D18 01)   15  Herniated lumbar intervertebral disc (722 10) (M51 26)   16  Lumbar spondylolysis (738 4) (M43 06)   17  Multiple pigmented nevi (216 9) (D22 9)   18  Need for hepatitis C screening test (V73 89) (Z11 59)   19  Obstructive sleep apnea syndrome (327 23) (G47 33)   20  Overweight (278 02) (E66 3)   21   Pain syndrome, chronic (338 4) (G89 4)   22  Primary localized osteoarthritis of right hip (715 15) (M16 11)   23  Primary localized osteoarthritis of right knee (715 16) (M17 11)   24  Rectus diastasis (728 84) (M62 08)   25  Sebaceous cyst (706 2) (L72 3)   26  Umbilical hernia (517 6) (K42 9)   27   Umbilical hernia without obstruction or gangrene (553 1) (K42 9)    Past Medical History   · History of Abdominal pain, RLQ (right lower quadrant) (789 03) (R10 31)   · History of Acute bronchitis (466 0) (J20 9)   · History of Acute maxillary sinusitis (461 0) (J01 00)   · History of Degenerative joint disease of knee, right (715 96) (M17 9)   · History of Dysfunction of both eustachian tubes (381 81) (H69 83)   · History of Epicondylitis, lateral, right (726 32) (M77 11)   · History of Esophagitis, reflux (530 11) (K21 0)   · History of acute pharyngitis (V12 69) (Z87 09)   · History of acute sinusitis (V12 69) (Z87 09)   · History of chronic sinusitis (V12 69) (Z87 09)   · History of Lyme disease (V12 09) (Z86 19)   · History of paronychia of finger (V13 3) (Z87 2)   · History of squamous cell carcinoma of skin (V10 83) (Z85 828)   · History of upper respiratory infection (V12 09) (Z87 09)   · History of viral warts (V12 09) (Z86 19)   · History of Ingrowing toenail (703 0) (L60 0)   · History of Lump of skin (782 2) (R22 9)   · History of Neck Strain (847 0)   · History of Pain of right sacroiliac joint (724 6) (M53 3)   · History of Paronychia Of The Left First Toe (681 11)   · History of Recurrent sinusitis (473 9) (J32 9)   · History of Swelling of right knee joint (719 06) (M25 461)   · History of Viral URI (465 9) (J06 9,B97 89)    Surgical History   · History of Hernia Repair   · History of Knee Surgery   · History of Knee Surgery    Family History  Mother    · Family history of malignant neoplasm (V16 9) (Z80 9)  Family History    · Family history of Back Pain   · Family history of Cancer   · Family history of Hypertension (V17 49)    Social History   · Alcohol Use (History)   · Always uses seat belt   · Currently working   ·    · Never A Smoker   · No drug use   · Occasional alcohol use   · Occupation   · 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 [employer]  · Two children    Current Meds   1  AmLODIPine Besylate 5 MG Oral Tablet; Take 1 tablet daily; Therapy: 05IPA2199 to (Rob Germain)  Requested for: 22QPQ4805; Last   Rx:05Oct2017 Ordered   2  Ciprofloxacin HCl TABS; patient reports taking a 300mg tablet every 6 hours daily for   dental produre; Therapy: (Recorded:05Oct2017) to Recorded   3  Claritin 10 MG Oral Capsule; Take one tablet daily; Therapy: 72EAD7790 to (Corrie Lama) Recorded   4  Nasonex 50 MCG/ACT Nasal Suspension; USE 2 SPRAYS IN EACH NOSTRIL ONCE   DAILY; Therapy: 09Apr2015 to (Last Rx:[id number])  Requested for: 20Oct2015 Ordered    Allergies  1  Lisinopril TABS   2  Penicillins    Vitals   Recorded: 37PIS0932 11:24AM   Heart Rate 78   Systolic 502   Diastolic 82   Height 5 ft 8 in   Weight 193 lb    BMI Calculated 29 35   BSA Calculated 2 01     Physical Exam    Constitutional - General appearance: Normal    Musculoskeletal - Gait and station: Normal    Cardiovascular - Examination of extremities for edema and/or varicosities: Normal    Skin - Skin and subcutaneous tissue: Normal    Neurologic - Sensation: Normal -- Lower extremity peripheral neuro exam: Normal    Psychiatric - Orientation to person, place, and time: Normal -- Mood and affect: Normal    Eyes   Conjunctiva and lids: Normal     Right Hip: Appearance: no deformity-- and-- no swelling  Tenderness: not the anterior hip joint,-- not the gluteus minimus,-- not the greater trochanter and bursa-- and-- not the sacroiliac joint  ROM:  Full  Internal rotation: which was painless  External rotation: which was painless   Motor: Normal  Special Tests: negative NICHELLE test,-- negative FADDIR test,-- negative Yandel's test-- and-- negative Straight Leg Raise  Right Knee: Appearance: swelling (lateral aspect ), but-- no deformity-- and-- no effusion  Tenderness: diffuse anterolateral aspect, but-- not diffusely over the anteromedial aspect,-- not the pes anserine bursa,-- not the prepatellar bursa,-- not over the lateral joint line,-- not over the medial joint line,-- not diffusely over the anterior knee,-- not the undersurface of the patella-- and-- not the quadriceps tendon  ROM: Full  Motor: Normal  Special Test: negative patellofemoral apprehension test,-- negative medial Rene test,-- negative lateral Rene test,-- no laxity on Valgus Stress-- and-- no laxity on Varus Stress        Future Appointments    Date/Time Provider Specialty Site   10/25/2017 01:00 PM Nadja Knight DO [facility] [place]   10/30/2017 10:30 AM Nadja Knight DO [facility] [place]     Signatures   Electronically signed by : Karthik Boyd MD; Oct 18 2017 11:54AM EST                       (Author)

## 2017-10-20 LAB
C1INH ACT/NOR SERPL: 99 %MEAN NORMAL
LABORATORY COMMENT REPORT: NORMAL
LABORATORY COMMENT REPORT: NORMAL
TRYPTASE SERPL-MCNC: 4.5 UG/L (ref 2.2–13.2)

## 2017-10-27 ENCOUNTER — APPOINTMENT (OUTPATIENT)
Dept: LAB | Facility: CLINIC | Age: 57
End: 2017-10-27
Payer: COMMERCIAL

## 2017-10-27 DIAGNOSIS — Z11.59 ENCOUNTER FOR SCREENING FOR OTHER VIRAL DISEASES (CODE): ICD-10-CM

## 2017-10-27 LAB
BILIRUB UR QL STRIP: NEGATIVE
CHOLEST SERPL-MCNC: 137 MG/DL (ref 50–200)
CLARITY UR: CLEAR
COLOR UR: YELLOW
GLUCOSE UR STRIP-MCNC: NEGATIVE MG/DL
HCV AB SER QL: NORMAL
HDLC SERPL-MCNC: 38 MG/DL (ref 40–60)
HGB UR QL STRIP.AUTO: NEGATIVE
KETONES UR STRIP-MCNC: NEGATIVE MG/DL
LDLC SERPL CALC-MCNC: 70 MG/DL (ref 0–100)
LEUKOCYTE ESTERASE UR QL STRIP: NEGATIVE
NITRITE UR QL STRIP: NEGATIVE
PH UR STRIP.AUTO: 7 [PH] (ref 4.5–8)
PROT UR STRIP-MCNC: NEGATIVE MG/DL
SP GR UR STRIP.AUTO: 1.01 (ref 1–1.03)
TRIGL SERPL-MCNC: 146 MG/DL
TSH SERPL DL<=0.05 MIU/L-ACNC: 2.46 UIU/ML (ref 0.36–3.74)
UROBILINOGEN UR QL STRIP.AUTO: 0.2 E.U./DL

## 2017-10-27 PROCEDURE — 80061 LIPID PANEL: CPT

## 2017-10-27 PROCEDURE — 86803 HEPATITIS C AB TEST: CPT

## 2017-10-27 PROCEDURE — 81003 URINALYSIS AUTO W/O SCOPE: CPT

## 2017-10-27 PROCEDURE — 84443 ASSAY THYROID STIM HORMONE: CPT

## 2017-10-27 PROCEDURE — 36415 COLL VENOUS BLD VENIPUNCTURE: CPT

## 2017-10-30 ENCOUNTER — ALLSCRIPTS OFFICE VISIT (OUTPATIENT)
Dept: OTHER | Facility: OTHER | Age: 57
End: 2017-10-30

## 2017-11-08 ENCOUNTER — ALLSCRIPTS OFFICE VISIT (OUTPATIENT)
Dept: OTHER | Facility: OTHER | Age: 57
End: 2017-11-08

## 2017-11-10 ENCOUNTER — GENERIC CONVERSION - ENCOUNTER (OUTPATIENT)
Dept: OTHER | Facility: OTHER | Age: 57
End: 2017-11-10

## 2017-11-30 ENCOUNTER — GENERIC CONVERSION - ENCOUNTER (OUTPATIENT)
Dept: OTHER | Facility: OTHER | Age: 57
End: 2017-11-30

## 2017-12-01 ENCOUNTER — TRANSCRIBE ORDERS (OUTPATIENT)
Dept: ADMINISTRATIVE | Facility: HOSPITAL | Age: 57
End: 2017-12-01

## 2017-12-01 ENCOUNTER — APPOINTMENT (OUTPATIENT)
Dept: RADIOLOGY | Facility: CLINIC | Age: 57
End: 2017-12-01
Payer: COMMERCIAL

## 2017-12-01 ENCOUNTER — GENERIC CONVERSION - ENCOUNTER (OUTPATIENT)
Dept: OTHER | Facility: OTHER | Age: 57
End: 2017-12-01

## 2017-12-01 ENCOUNTER — APPOINTMENT (OUTPATIENT)
Dept: LAB | Facility: CLINIC | Age: 57
End: 2017-12-01
Payer: COMMERCIAL

## 2017-12-01 DIAGNOSIS — T78.3XXD: Primary | ICD-10-CM

## 2017-12-01 DIAGNOSIS — K42.9 UMBILICAL HERNIA WITHOUT OBSTRUCTION OR GANGRENE: ICD-10-CM

## 2017-12-01 DIAGNOSIS — Z01.812 PRE-OPERATIVE LABORATORY EXAMINATION: ICD-10-CM

## 2017-12-01 DIAGNOSIS — T78.3XXD: ICD-10-CM

## 2017-12-01 DIAGNOSIS — K42.9 UMBILICAL HERNIA WITHOUT OBSTRUCTION OR GANGRENE: Primary | ICD-10-CM

## 2017-12-01 DIAGNOSIS — Z01.811 PRE-OPERATIVE RESPIRATORY EXAMINATION: ICD-10-CM

## 2017-12-01 LAB
ANION GAP SERPL CALCULATED.3IONS-SCNC: 7 MMOL/L (ref 4–13)
BUN SERPL-MCNC: 15 MG/DL (ref 5–25)
CALCIUM SERPL-MCNC: 8.5 MG/DL (ref 8.3–10.1)
CHLORIDE SERPL-SCNC: 108 MMOL/L (ref 100–108)
CO2 SERPL-SCNC: 25 MMOL/L (ref 21–32)
CREAT SERPL-MCNC: 1.09 MG/DL (ref 0.6–1.3)
EST. AVERAGE GLUCOSE BLD GHB EST-MCNC: 111 MG/DL
GFR SERPL CREATININE-BSD FRML MDRD: 76 ML/MIN/1.73SQ M
GLUCOSE SERPL-MCNC: 109 MG/DL (ref 65–140)
HBA1C MFR BLD: 5.5 % (ref 4.2–6.3)
POTASSIUM SERPL-SCNC: 4.2 MMOL/L (ref 3.5–5.3)
SODIUM SERPL-SCNC: 140 MMOL/L (ref 136–145)

## 2017-12-01 PROCEDURE — 71020 HB CHEST X-RAY 2VW FRONTAL&LATL: CPT

## 2017-12-01 PROCEDURE — 86003 ALLG SPEC IGE CRUDE XTRC EA: CPT

## 2017-12-01 PROCEDURE — 83036 HEMOGLOBIN GLYCOSYLATED A1C: CPT

## 2017-12-01 PROCEDURE — 36415 COLL VENOUS BLD VENIPUNCTURE: CPT

## 2017-12-01 PROCEDURE — 80048 BASIC METABOLIC PNL TOTAL CA: CPT

## 2017-12-04 ENCOUNTER — GENERIC CONVERSION - ENCOUNTER (OUTPATIENT)
Dept: FAMILY MEDICINE CLINIC | Facility: CLINIC | Age: 57
End: 2017-12-04

## 2017-12-04 RX ORDER — MOMETASONE FUROATE 50 UG/1
2 SPRAY, METERED NASAL DAILY
COMMUNITY
End: 2021-05-13

## 2017-12-04 RX ORDER — PANTOPRAZOLE SODIUM 40 MG/1
40 TABLET, DELAYED RELEASE ORAL DAILY
COMMUNITY

## 2017-12-04 RX ORDER — AMLODIPINE BESYLATE 5 MG/1
1 TABLET ORAL DAILY
COMMUNITY
Start: 2017-10-05 | End: 2018-06-04 | Stop reason: SDUPTHER

## 2017-12-04 NOTE — PRE-PROCEDURE INSTRUCTIONS
The following information was developed to assist you to prepare for your operation  What do I need to do before coming to the hospital?  · Arrange for a responsible person to drive you to and from the hospital   · Arrange care for your children at home  Children are not allowed in the recovery area of the hospital   · Plan to wear clothing that is easy to put on and take off  If you are having shoulder surgery, wear a shirt that buttons or zippers in front  Bathing  · Shower the evening before and the morning of your surgery with antibacterial soap  Please refer to the Pre Op Showering Instructions for Surgery Patient Sheet  · Remove nail polish and all body piercing jewelry  · Do not shave any body part for at least 24 hours before surgery-this includes face, arm, legs and upper body  Food  · Nothing to eat or drink after midnight the night before your surgery  This includes candy and chewing gum  Exception: If your surgery is after 12:00 pm (noon), you may have clear liquids such as 7-Up, ginger ale, apple or cranberry juice, Jello-O, water, or clear broth until 8:00 am   · Do not drink mild or juice with pulp on the morning before surgery  · Do not drink alcohol 24 hours before surgery  · Do not smoke 12 hours before surgery  Medicine  · Follow instructions you are received from your surgeon about which medicines you may take on the day of surgery  · If instructed to take medicine on the morning of surgery, take pills with just a small sip of water  Call your prescribing doctor for specific instructions on what to do if you take insulin  What should I bring to the hospital?  Bring  · Crutches or a walker, if you have them, for foot or knee surgery  · A list of the daily medications, vitamins, minerals, herbals and nutritional supplements you take  Include the dosages of medicines and the time you take them each day    · Glasses, dentures or hearing aids  · Minimal clothing; you will be wearing hospital sleepwear  · Photo ID; required to verify your identity  · If you have a Living Will or Power of , bring a copy of the documents  (only if being admitted)  · If you have an ostomy, bring an extra pouch and any supplies you use  Do Not Bring  · Medicines or Inhalers  · Money, valuables or jewelry    What other information should I know about the day of surgery? · Notify your surgeon if you develop a cold, sore throat, cough, fever, rash or any other illness  · Report to the Ambulatory Surgical/Same Day Surgery Unit  You will be instructed to stop at Registration only if you have not been pre-registered  · Inform your  if they do not stay that they will be asked by the staff to leave a phone number where they can be reached  · Be available to be reached before surgery  In the even the operating room schedule changes, you may be asked to come in earlier or later than expected  It is important to tell your doctor and others involved in your health care if you are taking or have been taking any non-prescription drugs, vitamins, minerals, herbals or other nutritional supplements  Any of these may interact with some food or medicines and cause a reaction  Before your operation, you play an important role in decreasing your risk for infection by washing with special antiseptic soap  This is an effective way to reduce bacteria on the skin which may help to prevent infections at the surgical site  Please read the following directions in advance  2  In the week before your operation purchase a 4 ounce bottle of antiseptic soap containing chlorhexidine gluconate 4%  Some brand names include: Aplicare, Endure, and Hibiclens  The cost is usually less than $5 00  · For your convenience, the [address] [place] [address] carries the soap  · It may also be available at your doctor's office or pre-admission testing center, and at most retail pharmacies    · If you are allergic or sensitive to soaps containing chlorhexidine gluconate (CHG), please let your doctor know so another antiseptic soap can be suggested  · CHG antiseptic soap is for external use only  2      The day before your operation follow these directions carefully to get ready  · Place clean lines (sheets) on your bed; you should sleep on clean sheets after your evening shower  · Get clean towels and washcloths ready - you need enough for 2 showers  · Set aside clean underwear, pajamas, and clothing to wear after the shower  Reminders:  · DO NOT use any other soap or body rinse on your skin during or after the antiseptic showers  · DO NOT use lotion , powder, deodorant, or perfume/aftershave of any kind on your skin after your antiseptic shower  · DO NOT shave any body parts in the 24 hours/the day before your operation  · DO NOT get the antiseptic soap in your eyes, ears, nose, mouth, or vaginal area  3      You will need to shower the night before AND the morning of your Surgery  Shower 1:  · The evening before your operation, take the fist shower  · First, shampoo your hair with regular shampoo and rinse it completely before you use the anitseptic soap  After washing and rinsing your hair, rinse your body  · Next, use a clean wash cloth to apply the antiseptic soap and wash your body from the neck down to your toes using 1/2 bottle of the antiseptic soap  You will use the other 1/2 bottle for the second shower  · Clean the area where your incision will be; later this area well for about 2 minutes  · If you ar having head or neck surgery, wash areas with the antiseptic soap  · Rinse yourself completely with running water  · Use a clean towel to dry off  · Wear clean underwear and clothing/pajamas  Shower 2:  · The Morning of your operation, take the second shower following the same steps as Shower 1 using the second 1/2 of the bottle of antiseptic soap    · Use clean cloths and towels to was and dry yourself off  · Wear clean underwear and clothing  Pre-Surgery Instructions:   Medication Instructions    amLODIPine (NORVASC) 5 mg tablet Patient was instructed by Physician and understands   mometasone (NASONEX) 50 mcg/act nasal spray Patient was instructed by Physician and understands   pantoprazole (PROTONIX) 40 mg tablet Patient was instructed by Physician and understands

## 2017-12-07 ENCOUNTER — ALLSCRIPTS OFFICE VISIT (OUTPATIENT)
Dept: OTHER | Facility: OTHER | Age: 57
End: 2017-12-07

## 2017-12-07 LAB — PEACH IGE QN: <0.1 KU/L

## 2017-12-08 ENCOUNTER — GENERIC CONVERSION - ENCOUNTER (OUTPATIENT)
Dept: FAMILY MEDICINE CLINIC | Facility: CLINIC | Age: 57
End: 2017-12-08

## 2017-12-08 LAB — MISCELLANEOUS LAB TEST RESULT: NORMAL

## 2017-12-11 ENCOUNTER — GENERIC CONVERSION - ENCOUNTER (OUTPATIENT)
Dept: FAMILY MEDICINE CLINIC | Facility: CLINIC | Age: 57
End: 2017-12-11

## 2017-12-12 ENCOUNTER — HOSPITAL ENCOUNTER (OUTPATIENT)
Facility: HOSPITAL | Age: 57
Setting detail: OUTPATIENT SURGERY
Discharge: HOME | End: 2017-12-12
Attending: SURGERY | Admitting: SURGERY
Payer: COMMERCIAL

## 2017-12-12 ENCOUNTER — ANESTHESIA (OUTPATIENT)
Dept: PERIOP | Facility: HOSPITAL | Age: 57
End: 2017-12-12
Payer: COMMERCIAL

## 2017-12-12 ENCOUNTER — ANESTHESIA EVENT (OUTPATIENT)
Dept: PERIOP | Facility: HOSPITAL | Age: 57
End: 2017-12-12
Payer: COMMERCIAL

## 2017-12-12 VITALS
WEIGHT: 194 LBS | HEIGHT: 67 IN | OXYGEN SATURATION: 95 % | SYSTOLIC BLOOD PRESSURE: 131 MMHG | BODY MASS INDEX: 30.45 KG/M2 | RESPIRATION RATE: 16 BRPM | DIASTOLIC BLOOD PRESSURE: 80 MMHG | TEMPERATURE: 97.7 F | HEART RATE: 62 BPM

## 2017-12-12 PROBLEM — K42.9 UMBILICAL HERNIA WITHOUT OBSTRUCTION OR GANGRENE: Status: ACTIVE | Noted: 2017-12-12

## 2017-12-12 PROBLEM — K42.9 UMBILICAL HERNIA WITHOUT OBSTRUCTION OR GANGRENE: Status: RESOLVED | Noted: 2017-12-12 | Resolved: 2017-12-12

## 2017-12-12 PROCEDURE — C1781 MESH (IMPLANTABLE): HCPCS | Performed by: SURGERY

## 2017-12-12 DEVICE — VENTRALEX ST HERNIA PATCH
Type: IMPLANTABLE DEVICE | Site: UMBILICAL | Status: FUNCTIONAL
Brand: VENTRALEX ST HERNIA PATCH

## 2017-12-12 RX ORDER — PROPOFOL 10 MG/ML
INJECTION, EMULSION INTRAVENOUS CONTINUOUS PRN
Status: DISCONTINUED | OUTPATIENT
Start: 2017-12-12 | End: 2017-12-12 | Stop reason: SURG

## 2017-12-12 RX ORDER — FENTANYL CITRATE 50 UG/ML
INJECTION, SOLUTION INTRAMUSCULAR; INTRAVENOUS AS NEEDED
Status: DISCONTINUED | OUTPATIENT
Start: 2017-12-12 | End: 2017-12-12 | Stop reason: SURG

## 2017-12-12 RX ORDER — HYDROCODONE BITARTRATE AND ACETAMINOPHEN 5; 325 MG/1; MG/1
2 TABLET ORAL EVERY 4 HOURS PRN
Status: DISCONTINUED | OUTPATIENT
Start: 2017-12-12 | End: 2017-12-12 | Stop reason: HOSPADM

## 2017-12-12 RX ORDER — ONDANSETRON 2 MG/ML
4 INJECTION INTRAMUSCULAR; INTRAVENOUS EVERY 6 HOURS PRN
Status: DISCONTINUED | OUTPATIENT
Start: 2017-12-12 | End: 2017-12-12 | Stop reason: HOSPADM

## 2017-12-12 RX ORDER — DEXAMETHASONE SODIUM PHOSPHATE 4 MG/ML
INJECTION, SOLUTION INTRA-ARTICULAR; INTRALESIONAL; INTRAMUSCULAR; INTRAVENOUS; SOFT TISSUE AS NEEDED
Status: DISCONTINUED | OUTPATIENT
Start: 2017-12-12 | End: 2017-12-12 | Stop reason: SURG

## 2017-12-12 RX ORDER — SODIUM CHLORIDE, SODIUM LACTATE, POTASSIUM CHLORIDE, CALCIUM CHLORIDE 600; 310; 30; 20 MG/100ML; MG/100ML; MG/100ML; MG/100ML
125 INJECTION, SOLUTION INTRAVENOUS CONTINUOUS
Status: DISCONTINUED | OUTPATIENT
Start: 2017-12-12 | End: 2017-12-12 | Stop reason: HOSPADM

## 2017-12-12 RX ORDER — SCOLOPAMINE TRANSDERMAL SYSTEM 1 MG/1
1 PATCH, EXTENDED RELEASE TRANSDERMAL
Status: DISCONTINUED | OUTPATIENT
Start: 2017-12-12 | End: 2017-12-12 | Stop reason: HOSPADM

## 2017-12-12 RX ORDER — HYDROCODONE BITARTRATE AND ACETAMINOPHEN 5; 325 MG/1; MG/1
1-2 TABLET ORAL EVERY 4 HOURS PRN
Qty: 30 TABLET | Refills: 0 | Status: SHIPPED | OUTPATIENT
Start: 2017-12-12 | End: 2017-12-22

## 2017-12-12 RX ORDER — MIDAZOLAM HYDROCHLORIDE 1 MG/ML
INJECTION INTRAMUSCULAR; INTRAVENOUS AS NEEDED
Status: DISCONTINUED | OUTPATIENT
Start: 2017-12-12 | End: 2017-12-12 | Stop reason: SURG

## 2017-12-12 RX ORDER — ACETAMINOPHEN 325 MG/1
650 TABLET ORAL EVERY 6 HOURS PRN
Status: DISCONTINUED | OUTPATIENT
Start: 2017-12-12 | End: 2017-12-12 | Stop reason: HOSPADM

## 2017-12-12 RX ORDER — FENTANYL CITRATE/PF 50 MCG/ML
25 SYRINGE (ML) INJECTION
Status: DISCONTINUED | OUTPATIENT
Start: 2017-12-12 | End: 2017-12-12 | Stop reason: HOSPADM

## 2017-12-12 RX ADMIN — FENTANYL CITRATE 50 MCG: 50 INJECTION, SOLUTION INTRAMUSCULAR; INTRAVENOUS at 13:33

## 2017-12-12 RX ADMIN — HYDROCODONE BITARTRATE AND ACETAMINOPHEN 2 TABLET: 5; 325 TABLET ORAL at 15:45

## 2017-12-12 RX ADMIN — PROPOFOL 100 MCG/KG/MIN: 10 INJECTION, EMULSION INTRAVENOUS at 13:34

## 2017-12-12 RX ADMIN — SODIUM CHLORIDE, SODIUM LACTATE, POTASSIUM CHLORIDE, AND CALCIUM CHLORIDE 125 ML/HR: .6; .31; .03; .02 INJECTION, SOLUTION INTRAVENOUS at 11:20

## 2017-12-12 RX ADMIN — CEFAZOLIN SODIUM 2000 MG: 2 SOLUTION INTRAVENOUS at 13:38

## 2017-12-12 RX ADMIN — MIDAZOLAM HYDROCHLORIDE 2 MG: 1 INJECTION, SOLUTION INTRAMUSCULAR; INTRAVENOUS at 13:30

## 2017-12-12 RX ADMIN — DEXAMETHASONE SODIUM PHOSPHATE 4 MG: 4 INJECTION, SOLUTION INTRAMUSCULAR; INTRAVENOUS at 13:38

## 2017-12-12 RX ADMIN — SCOPALAMINE 1 PATCH: 1 PATCH, EXTENDED RELEASE TRANSDERMAL at 13:31

## 2017-12-12 RX ADMIN — METRONIDAZOLE 500 MG: 500 INJECTION, SOLUTION INTRAVENOUS at 13:30

## 2017-12-12 NOTE — DISCHARGE INSTRUCTIONS
[facility] Instructions      Dr Shelley MARROQUIN D     1  General: Edyta Orona will feel pulling sensations around the wound or funny aches and pains around the incisions  This is normal  Even minor surgery is a change in your body and this is your bodys way of reaction to it  If you have had abdominal surgery, it may help to support the incision with a small pillow or blanket for comfort when moving or coughing  2  Wound care:       Glue - Leave glue alone, it will fall off on its own, no need for an additional dressings    3  Water: You may shower over the wound, unless there are drain tubes left in place  Do not bathe or use a pool or hot tub until cleared by the physician  You may shower right over the staples, glue or Steri-Strips and rinse wound with soapy water but do not scrub incision pat dry when you are done  4  Activity: You may go up and down stairs, walk as much as you are comfortable, but walk at least 3 times each day  If you have had abdominal surgery, do not lift anything heavier than 15 pounds for at least 2 weeks, unless cleared by the doctor  5  Diet: You may resume a regular diet  If you had a same-day surgery or overnight stay surgery, you may wish to eat lightly for a few days: soups, crackers, and sandwiches  You may resume a regular diet when ready  6  Medications: Resume all of your previous medications, unless told otherwise by the doctor  Avoid aspirin or ibuprofen (Advil, Motrin, etc ) products for 2-3 days after the date of surgery  You may, at that time, began to take them again  Tylenol is always fine, unless you are taking any narcotic pain medication containing Tylenol (such as Percocet, Darvocet, Vicodin, or anything containing acetaminophen)  Do not take Tylenol if you're taking these medications  You do not need to take the narcotic pain medications unless you are having significant pain and discomfort      7  Driving: He will need someone to drive you home on the day of surgery  Do not drive or make any important decisions while on narcotic pain medication or 24 hours and after anesthesia or sedation for surgery  Generally, you may drive when your off all narcotic pain medications  8  Upset Stomach: You may take Maalox, Tums, or similar items for an upset stomach  If your narcotic pain medication causes an upset stomach, do not take it on an empty stomach  Try taking it with at least some crackers or toast      9  Constipation: Patients often experienced constipation after surgery  You may take over-the-counter medication for this, such as Metamucil, Senokot, Dulcolax, milk of magnesia, etc  You may take a suppository unless you have had anorectal surgery such as a procedure on your hemorrhoids  If you experience significant nausea or vomiting after abdominal surgery, call the office before trying any of these medications  10  Call the office: If you are experiencing any of the following, fevers above 101 5°, significant nausea or vomiting, if the wound develops drainage and/or is excessive redness around the wound, or if you have significant diarrhea or other worsening symptoms  11  Pain: You may be given a prescription for pain  This will be given to the hospital, the day of surgery  12  Sexual Activity: You may resume sexual activity when you feel ready and comfortable and your incision is sealed and healed without apparent infection risk  13   Follow-up with dr Kelly Cardenas in 2 weeks       [facility]  Phone: [phone]

## 2017-12-12 NOTE — OP NOTE
OPEN UMBILICAL HERNIA REPAIR WITH MESH  Postoperative Note  PATIENT NAME: Rosalina Ni  : 1960  MRN: [id number]  [facility] OR ROOM 02    Surgery Date:     Umbilical hernia without obstruction or gangrene [K42 9]    Post-Op Diagnosis Codes:     * Umbilical hernia without obstruction or gangrene [K42 9]    Procedure(s):  OPEN UMBILICAL HERNIA REPAIR WITH MESH    Surgeon(s) and Role:     * Иван Mari MD - Primary     * Chloe Najjar, PA-C - Assisting    The Physician Assistant was medically necessary for surgical safety the case including suturing, retraction, and hemostasis  Specimens:  * No specimens in log *    Estimated Blood Loss:   Minimal    Anesthesia Type:   IV Sedation with Anesthesia     Procedure: The patient was seen in the Holding Room  The risks, benefits, complications, treatment options, and expected outcomes were discussed with the patient  The possibilities of reaction to medication, pulmonary aspiration, perforation of viscus, bleeding, recurrent infection, the need for additional procedures, failure to diagnose a condition, and creating a complication requiring transfusion or operation were discussed with the patient  The patient concurred with the proposed plan, giving informed consent  The site of surgery properly noted/marked  The patient was taken to Operating Room  A Time Out was held and the above information confirmed  The patient was prepped and draped in a sterile fashion  An additional timeout was performed  An infraumbilical incision was made, dissection carried out to the hernia sac  Hernia sac was freed of its surrounding adhesions  The hernia sac was opened and its contents reduced  Excess hernia sac was excised  A ventralax mesh was placed into the defect and the patch was placed in a Intra-Peritoneal  postion and secured using interrupted 2-0 Prolene sutures in a circumferential fashion,  assuring there were no defects or gaps in the mesh  This was doubly checked to secure the mesh without any interrupting gaps or defects  The fascia was closed interrupted figure 2-0 Prolene  The subcutaneous tissues were closed using 3-0 Vicryl sutures and the skin closed using a 4-0 Monocryl subcuticular stitch  The wound was dressed, placing positive pressure in the umbilicus with a gauze pack  The wound was dressed with Histacryl  The patient tolerated the procedure well  Instrument, sponge, and needle counts were correct prior to closure and at the conclusion of the case  This text is generated with voice recognition software  There may be translation, syntax,  or grammatical errors  If you have any questions, please contact the dictating provider       Complications: None    Condition: Stable to PACU    SIGNATURE: Luis Miguel Cruz MD   DATE: 2017   TIME: 2:07 PMOPERATIVE REPORT  PATIENT NAME: Stephen Camp    :  1960  MRN: [id number]  Pt Location:  OR ROOM 02    SURGERY DATE: 2017    Surgeon(s) and Role:     * Luis Miguel Cruz MD - Primary     Riki Jimenez PA-C - Assisting    Preop Diagnosis:  Umbilical hernia without obstruction or gangrene [K42 9]    Post-Op Diagnosis Codes:     * Umbilical hernia without obstruction or gangrene [K42 9]    Procedure(s) (LRB):  OPEN UMBILICAL HERNIA REPAIR WITH MESH (N/A)    Specimen(s):  * No specimens in log *    Estimated Blood Loss:   Minimal    Drains:       Anesthesia Type:   IV Sedation with Anesthesia    Operative Indications:  Umbilical hernia without obstruction or gangrene [K42 9]      Operative Findings:  Umbilical hernia    Complications:   None    Procedure and Technique:     I was present for the entire procedure    Patient Disposition:  PACU     SIGNATURE: Luis Miguel Cruz MD  DATE: 2017  TIME: 2:07 PM

## 2017-12-12 NOTE — ANESTHESIA PREPROCEDURE EVALUATION
Review of Systems/Medical History  Patient summary reviewed  Chart reviewed  History of anesthetic complications PONV    Cardiovascular  EKG reviewed, Hypertension ,    Pulmonary  Sleep apnea CPAP, ,        GI/Hepatic    GERD well controlled,             Endo/Other  Arthritis     GYN       Hematology   Musculoskeletal       Neurology   Psychology           Physical Exam    Airway    Mallampati score: III         Dental   No notable dental hx     Cardiovascular  Cardiovascular exam normal    Pulmonary  Pulmonary exam normal     Other Findings        Anesthesia Plan  ASA Score- 2       Anesthesia Type- general and IV sedation with anesthesia with ASA Monitors  Additional Monitors:   Airway Plan:           Induction- intravenous  Informed Consent- Anesthetic plan and risks discussed with patient  I personally reviewed this patient with the CRNA  Discussed and agreed on the Anesthesia Plan with the CRNA  Yasmin Olivo

## 2017-12-12 NOTE — DISCHARGE SUMMARY
Discharge Summary - Shun Rubalcava 56 y o  male MRN: [id number]    Unit/Bed#: OR [facility] Encounter: [id number]      Pre-Op Diagnosis Codes:     * Umbilical hernia without obstruction or gangrene [K42 9]  Post-Op Diagnosis Codes:     * Umbilical hernia without obstruction or gangrene [K42 9]      Procedures Performed:  Open repair of umbilical hernia with mesh        See H and P for full details of admission and op note  Patient recovered well and was discharged home  Patient was seen and examined prior to discharge  Provisions for Follow-Up Care:  See after visit summary for information related to follow-up care and any pertinent home health orders  Disposition: Home, in stable condition  Planned Readmission: No    Discharge Medications:  See after visit summary for reconciled discharge medications provided to patient and family  Post op instructions reviewed with patient and/or family  Rx given for discharge  Pt  discharge in improved and good condition      Signature:   Nathalie Jimenez PA-C  Date: 12/12/2017 Time: 1:22 PM

## 2017-12-12 NOTE — INTERIM OP NOTE
OPEN UMBILICAL HERNIA REPAIR WITH MESH  Postoperative Note  PATIENT NAME: Danita Torres  : 1960  MRN: [id number]  [facility] OR ROOM 02    Surgery Date: 2017    Preop Diagnosis:  Umbilical hernia without obstruction or gangrene [K42 9]    Post-Op Diagnosis Codes:     * Umbilical hernia without obstruction or gangrene [K42 9]    Procedure(s) (LRB):  OPEN UMBILICAL HERNIA REPAIR WITH MESH (N/A)    Surgeon(s) and Role:     * Robin Delgadillo MD - Primary     * Debbie Jimenez PA-C - Assisting    Specimens:  * No specimens in log *    Estimated Blood Loss:   Minimal    Anesthesia Type:   IV Sedation with Anesthesia     Findings: as above  1 5 cm x 1 5 cm     Complications:   None    Hernia Surgery Operative Note    Name: Danita Torres    Gender: male    Age: 56 y o  Race:     BMI: Body mass index is 30 38 kg/m²      DIAGNOSIS: umbilical hernia    Diabetes Mellitis: No    Coronary Heart Disease: No    Cancer: No    Steroid Use: No    Tobacco use: No   Last used: never smoked       Alcohol use: Yes Moderate    Location of Hernia: umbilical  VEBYTJ:3 0 cm  Width:1 05 cm  Primary: Yes  Recurrent: No   Number of recurrences:    Access: Open    Component Separation: No    Mesh:   Yes -  Type: Synthetic   Ventrlex     Operative Time: 27 min             SIGNATURE: Debbie Jimenez PA-C   DATE: 2017   TIME: 2:17 PM

## 2018-01-04 ENCOUNTER — GENERIC CONVERSION - ENCOUNTER (OUTPATIENT)
Dept: OTHER | Facility: OTHER | Age: 58
End: 2018-01-04

## 2018-01-10 NOTE — MISCELLANEOUS
Message   Recorded as Task   Date: 03/29/2017 08:05 AM, Created By: Mao Jorge   Task Name: Follow Up   Assigned To: [facility]   Regarding Patient: Lexy Abraham, Status: Active   Comment:    Aneta Pan - 29 Mar 2017 8:05 AM     TASK CREATED  S/p LESI on 3/20/17 w/[facility] in [place]  RT L5/S1 TFESI scheduled for 4/3/17    Please call 3/27/17   Oriana Giron - 29 Mar 2017 10:40 AM     TASK EDITED  Msg C# for pt to cb   Blanca Oviedo - 29 Mar 2017 1:06 PM     TASK REPLIED TO: Previously Assigned To [facility] [place] [facility],Team  Patient lm with answering service asking for cb has a few questions re: his recent procedure  Contacted patient who stated his original call was returning our call about his recent injection  Patient stated the relief he experienced last x 2days and currently has a lot of pain right sided -groin/hip  Pain comes and goes scores 6/7  Patient mentioned that he usually feels better when going to PT and being "stretched out" Patient was inquiring if Dr Lynda Davis could provide him with a script for PT  Please advise  Patient can be reached @ [phone]  Chang Jefferson - 29 Mar 2017 1:27 PM     TASK EDITED   Chang Jefferson - 29 Mar 2017 2:19 PM     TASK EDITED  s/w pt, states he has no relief at this time s/p LESI on 3/20  Pt states he has gone for PT w/ [facility] w/ + relief  Pt states his last visit was ~ 1 week ago - no order for pt, pt states he goes on his own and pays oop  Pt is requesting an order from [facility]) for PT w/ [facility]  Advised pt to proceed w/ R L5, S1 tfesi on 4/3  confirmed R sided low back / leg pain  Reviewed pre procedure instructions; eat a light meal - npo 1 hour prior to procedure  Loose fitting clothing, , c/b if illness / abx start prior to procedure  pt denied blood thinning medication  Advised pt, anticipate [facility] will send an order for PT today  This office will call back if there is any issue or instruction w/ that order   pt verbalized understanding and appreciation  *PT w/ [facility] ok? Order please  London Yang - 29 Mar 2017 2:43 PM     TASK REPLIED TO: Previously Assigned To London Yang  in allsccripts   Chang Jefferson - 29 Mar 2017 2:48 PM     TASK EDITED  Faxed to [facility]  Pt aware        Active Problems    1  Allergic rhinitis (477 9) (J30 9)   2  Cervical pain (723 1) (M54 2)   3  Chronic bilateral low back pain with bilateral sciatica (724 2,724 3,338 29)   (M54 42,M54 41,G89 29)   4  Chronic myofascial pain (729 1,338 29) (M79 1,G89 29)   5  Chronic sinusitis (473 9) (J32 9)   6  Degenerative joint disease of knee, right (715 96) (M17 9)   7  Elevated blood pressure reading without diagnosis of hypertension (796 2) (R03 0)   8  Esophageal reflux (530 81) (K21 9)   9  Essential hypertension (401 9) (I10)   10  Herniated lumbar intervertebral disc (722 10) (M51 26)   11  Lumbar spondylolysis (738 4) (M43 06)   12  Obstructive sleep apnea syndrome (327 23) (G47 33)   13  Pain syndrome, chronic (338 4) (G89 4)   14  Primary localized osteoarthritis of right knee (715 16) (M17 11)   15  Swelling of right knee joint (719 06) (M25 461)   16  Wart (078 10) (B07 9)    Current Meds   1  Lisinopril 10 MG Oral Tablet; TAKE 1 TABLET DAILY; Therapy: [id number] to (Evaluate:[id number])  Requested for: 20Mar2017; Last   Rx:20Mar2017 Ordered   2  Nasonex 50 MCG/ACT Nasal Suspension (Mometasone Furoate); USE 2 SPRAYS IN   EACH NOSTRIL ONCE DAILY; Therapy: 27Fje5533 to (Last Rx:20Oct2015)  Requested for: 20Oct2015 Ordered   3  Protonix 40 MG Oral Tablet Delayed Release (Pantoprazole Sodium); Take 1 tablet daily   Recorded    Allergies    1   Penicillins    Signatures   Electronically signed by : Samy Dejesus, ; Mar 29 2017  2:49PM EST                       (Author)

## 2018-01-10 NOTE — MISCELLANEOUS
Message   Recorded as Task   Date: 07/05/2017 12:13 PM, Created By: Zahira Chairez   Task Name: Call Back   Assigned To: [facility] [place] [facility]   Regarding Patient: Keanu Ortega, Status: In Progress   Comment:    Blanca Skaggs - 05 Jul 2017 12:13 PM     TASK CREATED  [facility]- Patient called stating that he is currently in [place] and threw his back out   wants to be prescribed muscle relaxers for the pain  Stated he has been taking advil which is not working and he cannot move  Patient stated he will be there until 07/09/17  c/b [phone]   Akilah White - 05 Jul 2017 12:58 PM     TASK IN PROGRESS   Akilah White - 05 Jul 2017 12:59 PM     TASK EDITED  Attempted to return pt's call, voicemail is full and not accepting messages  Will need to try again later  Brenda Nix - 05 Jul 2017 1:00 PM     TASK EDITED  phone call from patient returning your call, please call patient at [phone]  Willow Leonard - 05 Jul 2017 1:22 PM     TASK EDITED    I spoke with pt, states he was doing well, bent over and when he came up had pain in his low back  States he had taken methocarbamol in the past  Asking if we could call into [facility] [phone]  Advised we will discuss with Garcia and ALEJANDRA   ************   Garcia Aguayo - 05 Jul 2017 3:04 PM     TASK REPLIED TO: Previously Assigned To Garcia Aguayo  Can call in Methocarbamol 500 mg TID PRN for pain, disp #45 with 1 refill  Beyond that he will need to come in for an OV to be re-evaluated  Akilah White - 05 Jul 2017 3:45 PM     TASK IN PROGRESS   Akilah White - 05 Jul 2017 3:50 PM     TASK EDITED  Called in as instructted  Pt aware and confirmed sovs 7/14/17  Active Problems    1  Allergic rhinitis (477 9) (J30 9)   2  Bursitis of right hip (726 5) (M70 71)   3  Cervical pain (723 1) (M54 2)   4   Chronic bilateral low back pain with bilateral sciatica (724 2,724 3,338 29) (M54 42,M54 41,G89 29)   5  Chronic myofascial pain (729 1,338 29) (M79 1,G89 29)   6  Chronic sinusitis (473 9) (J32 9)   7  Degenerative joint disease of knee, right (715 96) (M17 9)   8  Esophageal reflux (530 81) (K21 9)   9  Essential hypertension (401 9) (I10)   10  Herniated lumbar intervertebral disc (722 10) (M51 26)   11  Lumbar spondylolysis (738 4) (M43 06)   12  Lyme disease (088 81) (A69 20)   13  Obstructive sleep apnea syndrome (327 23) (G47 33)   14  Pain of right sacroiliac joint (724 6) (M53 3)   15  Pain syndrome, chronic (338 4) (G89 4)   16  Primary localized osteoarthritis of right knee (715 16) (M17 11)   17  Right hip pain (719 45) (M25 551)   18  Swelling of right knee joint (719 06) (M25 461)   19  Wart (078 10) (B07 9)    Current Meds   1  Lisinopril 10 MG Oral Tablet; TAKE 1 TABLET DAILY; Therapy: 42MBB1978 to (Evaluate:18Nmh9688)  Requested for: 20Mar2017; Last   Rx:20Mar2017 Ordered   2  Meloxicam 7 5 MG Oral Tablet; Take 1 tablet twice daily; Therapy: 48ZCU6143 to (Radha Kamlesh)  Requested for: 76KTH2115; Last   Rx:65Rrc5310 Ordered   3  Methocarbamol 500 MG Oral Tablet; take one tab tid prn pain; Therapy: 09GTE8260 to Recorded   4  Nasonex 50 MCG/ACT Nasal Suspension (Mometasone Furoate); USE 2 SPRAYS IN   EACH NOSTRIL ONCE DAILY; Therapy: 99Rfk3976 to (Last Rx:19Vin3581)  Requested for: 20Oct2015 Ordered   5  Protonix 40 MG Oral Tablet Delayed Release (Pantoprazole Sodium); Take 1 tablet daily   Recorded    Allergies    1   Penicillins    Signatures   Electronically signed by : Rufino Barriga RN; Jul 5 2017  3:52PM EST                       (Author)

## 2018-01-11 ENCOUNTER — ALLSCRIPTS OFFICE VISIT (OUTPATIENT)
Dept: OTHER | Facility: OTHER | Age: 58
End: 2018-01-11

## 2018-01-11 NOTE — MISCELLANEOUS
Message   Recorded as Task   Date: 12/19/2016 02:27 PM, Created By: Mulu Her   Task Name: Miscellaneous   Assigned To: [facility] [place] [facility]   Regarding Patient: Rian Cunningham, Status: In Progress   Comment:    Karo Funes - 19 Dec 2016 2:27 PM     TASK CREATED  pt l/m on  line, upon reading last office not pt was to call with update on his prednisone and antbx use  Rizwan Glass Rizwan Glass pt would like to proceed with an injection but needs more clinical review  Narcisa Mercado - 19 Dec 2016 2:43 PM     TASK EDITED  Attempted to call the pt  to verify and left a detailed mom in regards to the previous task  Narcisa Mercado - 19 Dec 2016 2:43 PM     TASK IN PROGRESS        Active Problems    1  Acute maxillary sinusitis (461 0) (J01 00)   2  Allergic rhinitis (477 9) (J30 9)   3  Cervical pain (723 1) (M54 2)   4  Chronic bilateral low back pain with bilateral sciatica (724 2,724 3,338 29)   (M54 42,M54 41,G89 29)   5  Chronic myofascial pain (729 1,338 29) (M79 1,G89 29)   6  Chronic sinusitis (473 9) (J32 9)   7  Degenerative joint disease of knee, right (715 96) (M17 9)   8  Elevated blood pressure reading without diagnosis of hypertension (796 2) (R03 0)   9  Epicondylitis, lateral, right (726 32) (M77 11)   10  Esophageal reflux (530 81) (K21 9)   11  Herniated lumbar intervertebral disc (722 10) (M51 26)   12  Lumbar spondylolysis (738 4) (M43 06)   13  Obstructive sleep apnea syndrome (327 23) (G47 33)   14  Pain syndrome, chronic (338 4) (G89 4)   15  Primary localized osteoarthritis of right knee (715 16) (M17 11)   16  Recurrent sinusitis (473 9) (J32 9)   17  Swelling of right knee joint (719 06) (M25 461)   18  Wart (078 10) (B07 9)    Current Meds   1  Biaxin TABS (Clarithromycin) Recorded   2  Nasonex 50 MCG/ACT Nasal Suspension (Mometasone Furoate); USE 2 SPRAYS IN   EACH NOSTRIL ONCE DAILY; Therapy: 09Apr2015 to (Last Rx:20Oct2015)  Requested for: 20Oct2015 Ordered   3   PredniSONE 10 MG Oral Tablet Recorded   4  Protonix 40 MG Oral Tablet Delayed Release (Pantoprazole Sodium); Take 1 tablet daily   Recorded    Allergies    1   Penicillins    Signatures   Electronically signed by : Bonnie Souza, ; Dec 23 2016 12:13PM EST                       (Author)

## 2018-01-11 NOTE — MISCELLANEOUS
Message   Recorded as Task   Date: 07/28/2017 10:17 AM, Created By: Bismark Panda   Task Name: Follow Up   Assigned To: [facility] [place] [facility]   Regarding Patient: Denilson Espinoza, Status: Active   CommentTomy Fuse - 28 Jul 2017 10:17 AM     TASK CREATED  S/P RT HIP JNT INJ on 07/27/2017 w/[facility] [place]  No f/u scheduled        Please call on 08/03/2017   Bismark Panda - 47 Aug 2017 10:10 AM     TASK EDITED  Patient states he is doing good  Patient states he got 80-90% relief in the groin area after the injection  He still has some pain on the outside of his RT hip  Patient does not have a f/u scheduled  London Yang - 03 Aug 2017 10:24 AM     TASK REPLIED TO: Previously Assigned To [facility] [place] [facility]  Does he want to schedule a greater trochanteric bursa injection? Chang Jefferson - 03 Aug 2017 11:44 AM     TASK EDITED  s/w pt, advised of above  Pt stated that he feels good and is continuing w/ PT  Will cb if he feels pain is returning  Active Problems    1  Allergic rhinitis (477 9) (J30 9)   2  Cervical pain (723 1) (M54 2)   3  Chronic bilateral low back pain with bilateral sciatica (724 2,724 3,338 29)   (M54 42,M54 41,G89 29)   4  Chronic myofascial pain (729 1,338 29) (M79 1,G89 29)   5  Degenerative joint disease of knee, right (715 96) (M17 9)   6  Esophageal reflux (530 81) (K21 9)   7  Essential hypertension (401 9) (I10)   8  Greater trochanteric bursitis of right hip (726 5) (M70 61)   9  Herniated lumbar intervertebral disc (722 10) (M51 26)   10  Lumbar spondylolysis (738 4) (M43 06)   11  Obstructive sleep apnea syndrome (327 23) (G47 33)   12  Pain syndrome, chronic (338 4) (G89 4)   13  Primary localized osteoarthritis of right hip (715 15) (M16 11)   14  Primary localized osteoarthritis of right knee (715 16) (M17 11)    Current Meds   1  Lisinopril 10 MG Oral Tablet; TAKE 1 TABLET DAILY;    Therapy: 07VYP2808 to (Evaluate:14Jan2018)  Requested for: 96XQO2980; Last Rx: 60QKT1007 Ordered   2  Meloxicam 7 5 MG Oral Tablet; Take 1 tablet twice daily; Therapy: 55MWL5737 to (Leeann Catalan)  Requested for: 07EWB2648; Last   Rx:22Jun2017 Ordered   3  Methocarbamol 500 MG Oral Tablet; take one tab tid prn pain; Therapy: 60EVZ1879 to Recorded   4  Nasonex 50 MCG/ACT Nasal Suspension (Mometasone Furoate); USE 2 SPRAYS IN   EACH NOSTRIL ONCE DAILY; Therapy: 09Apr2015 to (Last Rx:20Oct2015)  Requested for: 20Oct2015 Ordered   5  Protonix 40 MG Oral Tablet Delayed Release (Pantoprazole Sodium); Take 1 tablet daily   Recorded    Allergies    1   Penicillins    Signatures   Electronically signed by : Brit Stanford, ; Aug  3 2017 11:44AM EST                       (Author)

## 2018-01-12 ENCOUNTER — GENERIC CONVERSION - ENCOUNTER (OUTPATIENT)
Dept: OTHER | Facility: OTHER | Age: 58
End: 2018-01-12

## 2018-01-12 VITALS
DIASTOLIC BLOOD PRESSURE: 78 MMHG | HEIGHT: 68 IN | WEIGHT: 197 LBS | BODY MASS INDEX: 29.86 KG/M2 | HEART RATE: 78 BPM | SYSTOLIC BLOOD PRESSURE: 116 MMHG

## 2018-01-12 VITALS
HEART RATE: 62 BPM | DIASTOLIC BLOOD PRESSURE: 82 MMHG | SYSTOLIC BLOOD PRESSURE: 130 MMHG | RESPIRATION RATE: 16 BRPM | BODY MASS INDEX: 30.25 KG/M2 | WEIGHT: 199.63 LBS | HEIGHT: 68 IN | TEMPERATURE: 98.2 F

## 2018-01-12 VITALS
SYSTOLIC BLOOD PRESSURE: 120 MMHG | TEMPERATURE: 98 F | BODY MASS INDEX: 30.46 KG/M2 | WEIGHT: 201 LBS | HEIGHT: 68 IN | HEART RATE: 58 BPM | DIASTOLIC BLOOD PRESSURE: 72 MMHG

## 2018-01-12 NOTE — MISCELLANEOUS
Message   Recorded as Task   Date: 06/20/2017 03:07 PM, Created By: Velma Sal   Task Name: Miscellaneous   Assigned To: [facility],Team   Regarding Patient: Katey Montgomery, Status: In Progress   Comment:    Karo Funes - 20 Jun 2017 3:07 PM     TASK CREATED  Caller: Self; [phone] (Home); [phone] x,,,,, (Work)  pt c/o rt groin pain/hip pain  walking with a limp, denies back pain, mostly rear end pain and hip bone area going down rt leg, pt states feels like a sciatica pain  pt last ov 11/2016   last inj 3/2017 a [facility], was scheduled for a TFESI on 4/2017 but cx'd due to lymes disease  advised pt will discuss w/provider and rtc  office f/u vs inj? Garcia Aguayo - 20 Jun 2017 4:22 PM     TASK REPLIED TO: Previously Assigned To Garcia Aguayo  Schedule pt for a f/u OV with the next available OV with me or DR Renu Dixon - 21 Jun 2017 8:48 AM     TASK REASSIGNED: Previously Assigned To [employer] - 23 Jun 2017 11:15 AM     TASK IN PROGRESS   RonnyWillow - 23 Jun 2017 11:17 AM     TASK EDITED  PT IS SCHEDULED FOR 7/14/17 AT 1        Active Problems    1  Allergic rhinitis (477 9) (J30 9)   2  Bursitis of right hip (726 5) (M70 71)   3  Cervical pain (723 1) (M54 2)   4  Chronic bilateral low back pain with bilateral sciatica (724 2,724 3,338 29)   (M54 42,M54 41,G89 29)   5  Chronic myofascial pain (729 1,338 29) (M79 1,G89 29)   6  Chronic sinusitis (473 9) (J32 9)   7  Degenerative joint disease of knee, right (715 96) (M17 9)   8  Esophageal reflux (530 81) (K21 9)   9  Essential hypertension (401 9) (I10)   10  Herniated lumbar intervertebral disc (722 10) (M51 26)   11  Lumbar spondylolysis (738 4) (M43 06)   12  Lyme disease (088 81) (A69 20)   13  Obstructive sleep apnea syndrome (327 23) (G47 33)   14  Pain of right sacroiliac joint (724 6) (M53 3)   15  Pain syndrome, chronic (338 4) (G89 4)   16   Primary localized osteoarthritis of right knee (715 16) (M17 11)   17  Right hip pain (719 45) (M25 551)   18  Swelling of right knee joint (719 06) (M25 461)   19  Wart (078 10) (B07 9)    Current Meds   1  Lisinopril 10 MG Oral Tablet; TAKE 1 TABLET DAILY; Therapy: 06EHD9373 to (Evaluate:56Orx1600)  Requested for: 20Mar2017; Last   Rx:20Mar2017 Ordered   2  Meloxicam 7 5 MG Oral Tablet; Take 1 tablet twice daily; Therapy: 51BLQ5956 to (Alda Bermudian)  Requested for: 35URJ7637; Last   Rx:55Pho7321 Ordered   3  Nasonex 50 MCG/ACT Nasal Suspension (Mometasone Furoate); USE 2 SPRAYS IN   EACH NOSTRIL ONCE DAILY; Therapy: 13Ccn1491 to (Last Rx:20Oct2015)  Requested for: 20Oct2015 Ordered   4  Protonix 40 MG Oral Tablet Delayed Release (Pantoprazole Sodium); Take 1 tablet daily   Recorded    Allergies    1  Penicillins    Signatures   Electronically signed by :  Radha Holcomb, ; Jun 23 2017 11:18AM EST                       (Author)

## 2018-01-12 NOTE — MISCELLANEOUS
Message   Recorded as Task   Date: 06/26/2017 05:25 PM, Created By: Ayden Miller   Task Name: Go to Result   Assigned To: Lei Araya   Regarding Patient: Jeannette Mathur, Status: In Progress   Comment:    Fina Scott - 26 Jun 2017 5:25 PM     TASK CREATED  Please let the patient know the x-rays of his hips demonstrated moderate arthritis  The x-ray of his back demonstrated mild degenerative changes  I would recommend considering a follow-up with orthopedics such as [facility] for possible injections of his hip  Chun Eloisa - 26 Jun 2017 6:40 PM     TASK REASSIGNED: Previously Assigned To Donato Kirkland - 27 Jun 2017 8:12 AM     TASK EDITED  Left message on home number to call office back  Johny,April - 27 Jun 2017 8:18 AM     TASK IN PROGRESS   Johny,April - 27 Jun 2017 8:26 AM     TASK EDITED  patient was informed and advised  states he was driving and was unable to take the phone number but will call our office back or look up the number later  Active Problems    1  Allergic rhinitis (477 9) (J30 9)   2  Bursitis of right hip (726 5) (M70 71)   3  Cervical pain (723 1) (M54 2)   4  Chronic bilateral low back pain with bilateral sciatica (724 2,724 3,338 29)   (M54 42,M54 41,G89 29)   5  Chronic myofascial pain (729 1,338 29) (M79 1,G89 29)   6  Chronic sinusitis (473 9) (J32 9)   7  Degenerative joint disease of knee, right (715 96) (M17 9)   8  Esophageal reflux (530 81) (K21 9)   9  Essential hypertension (401 9) (I10)   10  Herniated lumbar intervertebral disc (722 10) (M51 26)   11  Lumbar spondylolysis (738 4) (M43 06)   12  Lyme disease (088 81) (A69 20)   13  Obstructive sleep apnea syndrome (327 23) (G47 33)   14  Pain of right sacroiliac joint (724 6) (M53 3)   15  Pain syndrome, chronic (338 4) (G89 4)   16  Primary localized osteoarthritis of right knee (715 16) (M17 11)   17  Right hip pain (719 45) (M25 551)   18   Swelling of right knee joint (719 06) (M26 461)   19  Wart (075 10) (B07 9)    Current Meds   1  Lisinopril 10 MG Oral Tablet; TAKE 1 TABLET DAILY; Therapy: 95OTM0585 to (Evaluate:36Qon5757)  Requested for: 20Mar2017; Last   Rx:20Mar2017 Ordered   2  Meloxicam 7 5 MG Oral Tablet; Take 1 tablet twice daily; Therapy: 34XVB8874 to (Gallito Carrasco)  Requested for: 87ZEG8429; Last   Rx:42Aah5872 Ordered   3  Nasonex 50 MCG/ACT Nasal Suspension (Mometasone Furoate); USE 2 SPRAYS IN   EACH NOSTRIL ONCE DAILY; Therapy: 25Ixk0531 to (Last Rx:84Vjo2885)  Requested for: 20Oct2015 Ordered   4  Protonix 40 MG Oral Tablet Delayed Release (Pantoprazole Sodium); Take 1 tablet daily   Recorded    Allergies    1   Penicillins    Signatures   Electronically signed by : Donovan Isaacs, ; Jun 27 2017  8:26AM EST                       (Author)

## 2018-01-12 NOTE — MISCELLANEOUS
Message  Patient called the office at 3:43 PM to report that he thought he could be having a reaction to his flu shot that he received this morning during his office visit  He claims he has anterior throat and neck pain that makes chewing difficult  He has no chest pain, difficulty breathing, hives or skin reaction at this time  He was advised in light of recent angioedema to Lisinopril earlier this month , he should be evaluated at the ER to determine cause of his pain - cardiac ? allergic reaction ? muscular spasm from his neck ? He will consider ER and monitor his symptoms  Advised call our office if he has additional questions but ER is recommended  DJB      Active Problems    1  Allergic angioedema, initial encounter (995 1) (T78 3XXA)   2  Allergic rhinitis (477 9) (J30 9)   3  Chronic bilateral low back pain with bilateral sciatica (724 2,724 3,338 29)   (M54 42,M54 41,G89 29)   4  Chronic myofascial pain (729 1,338 29) (M79 1,G89 29)   5  Enlarged lymph node in neck (785 6) (R59 0)   6  Esophageal reflux (530 81) (K21 9)   7  Essential hypertension (401 9) (I10)   8  Greater trochanteric bursitis of right hip (726 5) (M70 61)   9  Hemangioma of skin (228 01) (D18 01)   10  Herniated lumbar intervertebral disc (722 10) (M51 26)   11  Lumbar spondylolysis (738 4) (M43 06)   12  Multiple pigmented nevi (216 9) (D22 9)   13  Needs flu shot (V04 81) (Z23)   14  Obstructive sleep apnea syndrome (327 23) (G47 33)   15  Overweight (278 02) (E66 3)   16  Pain syndrome, chronic (338 4) (G89 4)   17  Primary localized osteoarthritis of right hip (715 15) (M16 11)   18  Primary localized osteoarthritis of right knee (715 16) (M17 11)   19  Rectus diastasis (728 84) (M62 08)   20  Sebaceous cyst (706 2) (L72 3)   21  Umbilical hernia (806 7) (K42 9)   22  Umbilical hernia without obstruction or gangrene (553 1) (K42 9)    Current Meds   1  AmLODIPine Besylate 5 MG Oral Tablet; Take 1 tablet daily;    Therapy: 12QOP8913 to (Carin Sanchez)  Requested for: 73POO1409; Last   Rx:20Xyj8861 Ordered   2  Claritin 10 MG Oral Capsule; Take one tablet daily; Therapy: 22BAQ2243 to (Bharath Camacho) Recorded   3  Nasonex 50 MCG/ACT Nasal Suspension; USE 2 SPRAYS IN EACH NOSTRIL ONCE   DAILY; Therapy: 09Apr2015 to (Last Rx:11Xis2012)  Requested for: 20Oct2015 Ordered    Allergies    1  Lisinopril TABS   2   Penicillins    Plan  Needs flu shot    · Fluzone Quadrivalent Intramuscular Suspension    Signatures   Electronically signed by : Delphine Abrams DO; Oct 30 2017  5:34PM EST                       (Author)

## 2018-01-13 VITALS
WEIGHT: 196 LBS | DIASTOLIC BLOOD PRESSURE: 80 MMHG | RESPIRATION RATE: 17 BRPM | BODY MASS INDEX: 29.7 KG/M2 | TEMPERATURE: 97.2 F | HEART RATE: 75 BPM | SYSTOLIC BLOOD PRESSURE: 118 MMHG | HEIGHT: 68 IN

## 2018-01-13 VITALS
DIASTOLIC BLOOD PRESSURE: 100 MMHG | SYSTOLIC BLOOD PRESSURE: 142 MMHG | BODY MASS INDEX: 30.01 KG/M2 | HEART RATE: 64 BPM | HEIGHT: 68 IN | WEIGHT: 198 LBS

## 2018-01-13 VITALS
HEIGHT: 68 IN | HEART RATE: 78 BPM | BODY MASS INDEX: 29.25 KG/M2 | SYSTOLIC BLOOD PRESSURE: 120 MMHG | WEIGHT: 193 LBS | DIASTOLIC BLOOD PRESSURE: 82 MMHG

## 2018-01-13 VITALS
RESPIRATION RATE: 18 BRPM | WEIGHT: 195 LBS | BODY MASS INDEX: 29.55 KG/M2 | SYSTOLIC BLOOD PRESSURE: 122 MMHG | HEIGHT: 68 IN | DIASTOLIC BLOOD PRESSURE: 76 MMHG | TEMPERATURE: 98.8 F | HEART RATE: 76 BPM

## 2018-01-13 NOTE — MISCELLANEOUS
Message   Recorded as Task   Date: 02/29/2016 01:01 PM, Created By: Ayden Miller   Task Name: Go to Result   Assigned To: Chun Amin   Regarding Patient: Jeannette Mathur, Status: Active   Comment:    Ayden Miller - 29 Feb 2016 1:01 PM     TASK CREATED  Please let Adán Ann know that his elbow x-ray was unremarkable  He should follow-up with physical therapy as ordered  02/29/2016 Patient notified, has an appointment with physical therapy in 4 days  trb      Active Problems    1  Allergic rhinitis (477 9) (J30 9)   2  Chronic sinusitis (473 9) (J32 9)   3  Degenerative joint disease of knee, right (715 96) (M17 9)   4  Elevated blood pressure reading without diagnosis of hypertension (796 2) (R03 0)   5  Epicondylitis, lateral, right (726 32) (M77 11)   6  Esophageal reflux (530 81) (K21 9)   7  Herniated lumbar intervertebral disc (722 10) (M51 26)   8  Obstructive sleep apnea syndrome (327 23) (G47 33)   9  Swelling of right knee joint (719 06) (M25 461)   10  Wart (078 10) (B07 9)    Current Meds   1  Nasonex 50 MCG/ACT Nasal Suspension; USE 2 SPRAYS IN EACH NOSTRIL ONCE   DAILY; Therapy: 09Apr2015 to (Last Rx:20Oct2015)  Requested for: 20Oct2015 Ordered   2  Protonix 40 MG Oral Tablet Delayed Release (Pantoprazole Sodium); Take 1 tablet daily   Recorded   3  Voltaren 1 % Transdermal Gel; APPLY TO RIGHT ELBOW, 2 GM OF GEL TO AFFECTED   AREA 4 TIMES DAILY  DO NOT APPLY MORE THAN 8 GM DAILY TO ANY ONE   AFFECTED JOINT; Therapy: 95KZS3319 to (Evaluate:28Mar2016)  Requested for: 40Snc8121; Last   Rx:99Qri3151 Ordered    Allergies    1  Penicillins    Signatures   Electronically signed by :  Tab Castrejon, ; Feb 29 2016  4:47PM EST                       (Author)

## 2018-01-13 NOTE — RESULT NOTES
Message   Recorded as Task   Date: 04/05/2017 10:40 AM, Created By: Adam Benavidez   Task Name: Miscellaneous   Assigned To: Karo Funes   Regarding Patient: Lupis Mcgregor, Status: Active   Comment:    Karo Funes - 05 Apr 2017 10:40 AM     TASK CREATED  pt called to cx his procedure for 4/6/2017, pt diagnosed with Lyme's disease and on antibiotic     London Yang - 05 Apr 2017 10:43 AM     TASK REPLIED TO: Previously Assigned To London Yang  aware

## 2018-01-13 NOTE — PROGRESS NOTES
Assessment   1  Acute maxillary sinusitis (461 0) (J01 00)    Plan   Acute maxillary sinusitis    · Clarithromycin 500 MG Oral Tablet; TAKE 1 TABLET EVERY 12 HOURS DAILY   · Apply warm moist compresses to the affected area 3 times a day for 5 minutes ;    Status:Complete;   Done: 16MBV5971   · Drink at least 6 glasses of water or juice a day ; Status:Complete;   Done: 45XUC1345   · How to use a nasal spray ; Status:Complete;   Done: 61JTY1569   · Irrigate your nose twice a day ; Status:Complete;   Done: 77GWR9593   · Taking a hot steamy shower may help your condition ; Status:Complete;   Done:    74FKG6815   · Follow Up if Not Better Evaluation and Treatment  Follow-up  Status: Complete  Done:    23MQV8807   · Call [phone] if: The fever comes back after being normal for 2 days ;    Status:Complete;   Done: 77DON0692   · Call [phone] if: The sinus pain is not better in 1 week ; Status:Complete;   Done:    51OVF1168   · Call [phone] if: The symptoms are not better in 7 days ; Status:Complete;   Done:    64EMF9593   · Call [phone] if: The symptoms come back after the medications are finished ;    Status:Complete;   Done: 67OFM3641   · Call [phone] if: You start vomiting ; Status:Complete;   Done: 22VIR7393   · Call [phone] if: Your sinus pain is worse ; Status:Complete;   Done: 02ZKI8731   · Call [phone] if: Your temperature is higher than 101F ; Status:Complete;   Done:    21YZY0316    Discussion/Summary      The patient will take the antibiotic as prescribed  He can continue with his over-the-counter Coricidin HBP and Mucinex DM as needed for symptom relief  He will increase his fluids and rest  He'll call if there's no improvement within one week or if the condition worsens  Chief Complaint   patient complains of runny nose, stuffy nose, sinus congestion and cough beginning 1/1/18        History of Present Illness   HPI: Kamlesh Belcher is a 57 y/o male who presents with runny nose, congestion, and coughing since 1/1/2018  Hisn family was also sick  There is nasal congestion  There was a fever last week  There is PND and there is yellow nasal drainage  There is sinus pressure  There is a cough-but it is mild  There is no nausea, vomiting, or diarrhea  There is facial pressure and is getting worse  Cough:    Stephanie Farmer presents with complaints of cough starting about 10 days ago  He is currently experiencing cough  Associated symptoms include dyspnea,-- chills,-- fever,-- runny nose,-- myalgias,-- postnasal drainage-- and-- nose itching, but-- no wheezing,-- no stuffy nose,-- no sore throat,-- no pleuritic chest pain,-- no chest pain,-- no vomiting,-- no heartburn,-- no mouth breathing,-- no noisy breathing,-- no rapid breathing,-- no hoarseness,-- no painful swallowing,-- no eye itching,-- no headache,-- no hemoptysis-- and-- no night sweats  Review of Systems        Constitutional: as noted in HPI       ENT: as noted in HPI  Cardiovascular: as noted in HPI  Respiratory: as noted in HPI  Gastrointestinal: as noted in HPI  Genitourinary: as noted in HPI  Musculoskeletal: as noted in HPI  Integumentary: as noted in HPI  Active Problems   1  Allergic angioedema, initial encounter (995 1) (T78 3XXA)   2  Allergic rhinitis (477 9) (J30 9)   3  Chronic bilateral low back pain with bilateral sciatica (724 2,724 3,338 29)     (M54 42,M54 41,G89 29)   4  Chronic myofascial pain (729 1,338 29) (M79 1,G89 29)   5  Colon cancer screening (V76 51) (Z12 11)   6  Enlarged lymph node in neck (785 6) (R59 0)   7  Esophageal reflux (530 81) (K21 9)   8  Essential hypertension (401 9) (I10)   9  Greater trochanteric bursitis of right hip (726 5) (M70 61)   10  Hemangioma of skin (228 01) (D18 01)   11  Herniated lumbar intervertebral disc (722 10) (M51 26)   12  Lumbar spondylolysis (738 4) (M43 06)   13   Multiple pigmented nevi (216 9) (D22 9)   14  Obstructive sleep apnea syndrome (327 23) (G47 33)   15  Overweight (278 02) (E66 3)   16  Pain syndrome, chronic (338 4) (G89 4)   17  Postop check (V67 00) (Z09)   18  Primary localized osteoarthritis of right hip (715 15) (M16 11)   19  Primary localized osteoarthritis of right knee (715 16) (M17 11)   20  Rectus diastasis (728 84) (M62 08)   21  Umbilical hernia (706 8) (K42 9)   22  Umbilical hernia without obstruction or gangrene (553 1) (K42 9)    Past Medical History   1  History of Abdominal pain, RLQ (right lower quadrant) (789 03) (R10 31)   2  History of Abdominal pain, RUQ (789 01) (R10 11)   3  History of Acute bronchitis (466 0) (J20 9)   4  History of Acute maxillary sinusitis (461 0) (J01 00)   5  History of Degenerative joint disease of knee, right (715 96) (M17 9)   6  History of Dysfunction of both eustachian tubes (381 81) (H69 83)   7  History of Epicondylitis, lateral, right (726 32) (M77 11)   8  History of Esophagitis, reflux (530 11) (K21 0)   9  History of acute pharyngitis (V12 69) (Z87 09)   10  History of acute sinusitis (V12 69) (Z87 09)   11  History of chronic sinusitis (V12 69) (Z87 09)   12  History of diarrhea (V12 79) (Z87 898)   13  History of Lyme disease (V12 09) (Z86 19)   14  History of neck pain (V13 59) (Z87 39)   15  History of paronychia of finger (V13 3) (Z87 2)   16  History of sebaceous cyst (V13 3) (Z87 2)   17  History of squamous cell carcinoma of skin (V10 83) (Z85 828)   18  History of upper respiratory infection (V12 09) (Z87 09)   19  History of viral warts (V12 09) (Z86 19)   20  History of Ingrowing toenail (703 0) (L60 0)   21  History of Lump of skin (782 2) (R22 9)   22  History of Neck Strain (847 0)   23  History of Pain of right sacroiliac joint (724 6) (M53 3)   24  History of Paronychia Of The Left First Toe (681 11)   25  History of Recurrent sinusitis (473 9) (J32 9)   26   History of Swelling of right knee joint (719 06) (M25 461)   27  History of Viral URI (465 9) (J06 9,B97 89)  Active Problems And Past Medical History Reviewed: The active problems and past medical history were reviewed and updated today  Family History   Mother    1  Family history of malignant neoplasm (V16 9) (Z80 9)  Family History    2  Family history of Back Pain   3  Family history of Cancer   4  Family history of Hypertension (V17 49)  Family History Reviewed: The family history was reviewed and updated today  Social History    · Alcohol Use (History)   · Always uses seat belt   · Currently working   ·    · Never a smoker   · No drug use   · Occasional alcohol use   · Occupation   · President at the [employer]  · Two children  The social history was reviewed and updated today  The social history was reviewed and is unchanged  Surgical History   1  History of Hernia Repair   2  History of Knee Surgery   3  History of Knee Surgery  Surgical History Reviewed: The surgical history was reviewed and updated today  Current Meds    1  AmLODIPine Besylate 5 MG Oral Tablet; Take 1 tablet daily; Therapy: 83MTP0397 to (Jaye Poles)  Requested for: 88BHI5730; Last     Rx:05Oct2017 Ordered   2  Claritin 10 MG Oral Capsule; Take one tablet daily; Therapy: 47ZUH7084 to (Illa Lapidus) Recorded   3  Nasonex 50 MCG/ACT Nasal Suspension; USE 2 SPRAYS IN EACH NOSTRIL ONCE     DAILY; Therapy: 09Apr2015 to (Last Rx:05Hnx5763)  Requested for: 20Oct2015 Ordered   4  Protonix 40 MG Oral Tablet Delayed Release; Take 1 tablet daily; Therapy: (Recorded:52Ysd0086) to Recorded    Allergies   1  Lisinopril TABS   2   Penicillins    Vitals    Recorded: 27TUJ6522 10:48AM   Temperature 97 9 F, Tympanic   Heart Rate 78   Respiration 16   Systolic 397, RUE, Sitting   Diastolic 86, RUE, Sitting   Height 5 ft 8 in   Weight 197 lb 8 oz   BMI Calculated 30 03   BSA Calculated 2 03     Physical Exam Constitutional      General appearance: No acute distress, well appearing and well nourished  Ears, Nose, Mouth, and Throat      External inspection of ears and nose: Normal        Otoscopic examination: Tympanic membrance translucent with normal light reflex  Canals patent without erythema  Nasal mucosa, septum, and turbinates: Abnormal   There was a mucoid discharge and a purulent discharge from both nares  The bilateral nasal mucosa was edematous-- and-- red  Oropharynx: Abnormal   The posterior pharynx was erythematous, but-- did not have an exudate  Pulmonary      Respiratory effort: No increased work of breathing or signs of respiratory distress  Auscultation of lungs: Clear to auscultation, equal breath sounds bilaterally, no wheezes, no rales, no rhonci  Cardiovascular      Auscultation of heart: Normal rate and rhythm, normal S1 and S2, without murmurs  Examination of extremities for edema and/or varicosities: Normal        Carotid pulses: Normal        Abdomen      Abdomen: Non-tender, no masses  Liver and spleen: No hepatomegaly or splenomegaly  Lymphatic      Palpation of lymph nodes in neck: Abnormal   bilateral anterior cervical node enlargement           Future Appointments      Date/Time Provider Specialty Site   01/12/2018 10:30 AM Johnny Garcia MD General Surgery [facility]     Signatures    Electronically signed by : Subha Le DO; Jan 12 2018  6:55AM EST                       (Author)

## 2018-01-14 VITALS
SYSTOLIC BLOOD PRESSURE: 120 MMHG | BODY MASS INDEX: 30.01 KG/M2 | TEMPERATURE: 97 F | HEART RATE: 68 BPM | RESPIRATION RATE: 16 BRPM | HEIGHT: 68 IN | WEIGHT: 198 LBS | DIASTOLIC BLOOD PRESSURE: 78 MMHG

## 2018-01-14 VITALS
WEIGHT: 202 LBS | HEART RATE: 63 BPM | DIASTOLIC BLOOD PRESSURE: 86 MMHG | BODY MASS INDEX: 30.62 KG/M2 | SYSTOLIC BLOOD PRESSURE: 146 MMHG | HEIGHT: 68 IN

## 2018-01-14 VITALS
SYSTOLIC BLOOD PRESSURE: 134 MMHG | TEMPERATURE: 97.9 F | HEART RATE: 68 BPM | DIASTOLIC BLOOD PRESSURE: 80 MMHG | RESPIRATION RATE: 15 BRPM | HEIGHT: 68 IN | BODY MASS INDEX: 29.55 KG/M2 | WEIGHT: 195 LBS

## 2018-01-14 VITALS
SYSTOLIC BLOOD PRESSURE: 140 MMHG | HEIGHT: 68 IN | DIASTOLIC BLOOD PRESSURE: 80 MMHG | TEMPERATURE: 98.3 F | HEART RATE: 80 BPM | RESPIRATION RATE: 17 BRPM | BODY MASS INDEX: 29.4 KG/M2 | WEIGHT: 194 LBS

## 2018-01-14 NOTE — RESULT NOTES
Message   Recorded as Task   Date: 03/15/2017 10:42 AM, Created By: Grace Flores   Task Name: Miscellaneous   Assigned To: Karo Funes   Regarding Patient: Vern Bryan, Status: Active   Comment:    Karo Funes - 15 Mar 2017 10:42 AM     TASK CREATED  pt called to see about scheduling an injection  pt's last ov was 11/2016, last inj lesi on 6/2015, was scheduled for rpt in 7/15 but cx as he felt better  pt states last antbx/steroids were in January  pt c/u pain in rt groin, hip, down leg more so when sitting  was not sure if pt needed a f/u prior? advised pt will call back after discussing w/provider  [phone]   Garcia Aguayo - 15 Mar 2017 10:49 AM     TASK REPLIED TO: Previously Assigned To Garcia Aguayo  We can scheduled him for an LESI with Dr Yesica Monae, using the same codes and levels as his last injection  Karo Funes - 15 Mar 2017 12:26 PM     TASK EDITED  proc scheduled for 3/20/2017 pt denies bld thinners/antbx, aware need for , npo 1 hr prior, hold advil 1 day prior, wear comfortable pants, aware if becomes ill/fever/antbx call to r/s pt verbally understanding of instructions

## 2018-01-15 VITALS
HEART RATE: 72 BPM | HEIGHT: 68 IN | DIASTOLIC BLOOD PRESSURE: 90 MMHG | BODY MASS INDEX: 29.55 KG/M2 | WEIGHT: 195 LBS | SYSTOLIC BLOOD PRESSURE: 130 MMHG

## 2018-01-15 NOTE — MISCELLANEOUS
Message  I spoke with the patient and advised him that the Lyme titre is positive  We'll treat him with doxycycline 100 mg twice a day for 3 weeks  He'll call with any worsening symptoms or fever  We'll follow-up with him as scheduled  Plan  Lyme disease    · Doxycycline Monohydrate 100 MG Oral Capsule; TAKE 1 CAPSULE TWICE DAILY  for 3 weeks    Signatures   Electronically signed by : Maria Luz Kamara DO;  Apr 5 2017  6:35AM EST                       (Author)

## 2018-01-15 NOTE — MISCELLANEOUS
Message   Recorded as Task   Date: 02/01/2017 09:52 AM, Created By: Keenan Patel   Task Name: Miscellaneous   Assigned To: [facility] [place] [facility]   Regarding Patient: Darlene Alpers, Status: In Progress   Comment:    Karo Funes - 01 Feb 2017 9:52 AM     TASK CREATED  pt called wanting to get injection  pt's last injection was 6/2015, last ov was on 11/2016  pt completed prednisone/antbx from other ailments  Back is bothering him still pain is worst when sitting, does wake him up at night  pain goes around to the right side into hip, groin, down leg   advise pt will discuss w/provider and rtc  Garcia Aguaoy - 01 Feb 2017 10:22 AM     TASK REPLIED TO: Previously Assigned To [facility] [place] [facility],Team  As long as he is not on any antibiotics or has had any more steroids since November when I saw him last  You can schedule him for a repeat LESI at L5-S1 level using the same codes as his last injection with Dr Nhan Yost  Thank you  Chang Jefferson - 01 Feb 2017 11:23 AM     TASK EDITED  Left a detailed message on machine as per release of info on file  Advised pt of above, provided cb number for questions or concerns  Chang Jefferson - 01 Feb 2017 11:23 AM     TASK IN PROGRESS        Active Problems    1  Acute maxillary sinusitis (461 0) (J01 00)   2  Allergic rhinitis (477 9) (J30 9)   3  Cervical pain (723 1) (M54 2)   4  Chronic bilateral low back pain with bilateral sciatica (724 2,724 3,338 29)   (M54 42,M54 41,G89 29)   5  Chronic myofascial pain (729 1,338 29) (M79 1,G89 29)   6  Chronic sinusitis (473 9) (J32 9)   7  Degenerative joint disease of knee, right (715 96) (M17 9)   8  Dysfunction of both eustachian tubes (381 81) (H69 83)   9  Elevated blood pressure reading without diagnosis of hypertension (796 2) (R03 0)   10  Epicondylitis, lateral, right (726 32) (M77 11)   11  Esophageal reflux (530 81) (K21 9)   12  Herniated lumbar intervertebral disc (722 10) (M51 26)   13   Lumbar spondylolysis (738 4) (M43 06)   14  Obstructive sleep apnea syndrome (327 23) (G47 33)   15  Pain syndrome, chronic (338 4) (G89 4)   16  Primary localized osteoarthritis of right knee (715 16) (M17 11)   17  Recurrent sinusitis (473 9) (J32 9)   18  Swelling of right knee joint (719 06) (M25 461)   19  Wart (078 10) (B07 9)    Current Meds   1  Clarithromycin 500 MG Oral Tablet (Biaxin); Take 1 tablet twice daily; Therapy: 85Gpb6214 to (Evaluate:03Jan2017)  Requested for: 03Xql0186; Last   Rx:63Npb0932 Ordered   2  Nasonex 50 MCG/ACT Nasal Suspension (Mometasone Furoate); USE 2 SPRAYS IN   EACH NOSTRIL ONCE DAILY; Therapy: 09Apr2015 to (Last Rx:20Oct2015)  Requested for: 20Oct2015 Ordered   3  PredniSONE 10 MG Oral Tablet; take 4 tabs daily for 3 days   3 tabs daily for 3 days   2 tabs daily for 3 days, 1 tab daily for 3 days; Therapy: 13Cza3266 to (Evaluate:20Jan2017)  Requested for: 66Nhm0962; Last   Rx:06Tzc6499 Ordered   4  Protonix 40 MG Oral Tablet Delayed Release (Pantoprazole Sodium); Take 1 tablet daily   Recorded    Allergies    1   Penicillins    Signatures   Electronically signed by : Sherin Faith, ; Feb 2 2017  4:33PM EST                       (Author)

## 2018-01-17 NOTE — PROGRESS NOTES
History of Present Illness  ED Outreach:   ED Visit Information  ED visit date: 09/30/2017   Diagnosis description: PERIAPICAL ABSCESS WITHOUT SINUS   Facility name: [address]   Discharge status: Home  Number of ED visits to date: 1  ED severity: 3  In network  Outreach Information  Outreach not needed  Date finalized: 10/16/2017  Care Coordination  Emergent necessity not warranted by diagnosis  [facility] PCP  Did not call PCP first  Follow up appointment with PCP  Date and time of follow up: 10/05/2017  Pt not admitted from ED  Patient without existing specialty follow up appointments in network  Future Appointments    Date/Time Provider Specialty Site   10/18/2017 11:15 AM Dionisio Lincoln MD Orthopedic Surgery [facility]   10/25/2017 01:00 PM Caye Dalia, DO Family Medicine [facility]   10/30/2017 10:30 AM Caye Dalia, DO Family Medicine [place]     Signatures   Electronically signed by :  Rose Kimball, ; Oct 16 2017 10:26AM EST                       (Author)

## 2018-01-22 VITALS
DIASTOLIC BLOOD PRESSURE: 84 MMHG | HEART RATE: 68 BPM | WEIGHT: 199.04 LBS | HEIGHT: 68 IN | SYSTOLIC BLOOD PRESSURE: 122 MMHG | TEMPERATURE: 99.1 F | BODY MASS INDEX: 30.16 KG/M2 | RESPIRATION RATE: 16 BRPM

## 2018-01-23 VITALS
TEMPERATURE: 97.9 F | WEIGHT: 197.5 LBS | RESPIRATION RATE: 16 BRPM | BODY MASS INDEX: 29.93 KG/M2 | SYSTOLIC BLOOD PRESSURE: 126 MMHG | DIASTOLIC BLOOD PRESSURE: 86 MMHG | HEIGHT: 68 IN | HEART RATE: 78 BPM

## 2018-01-23 NOTE — PROGRESS NOTES
Assessment    1  Encounter for preventive health examination (V70 0) (Z00 00)    Plan  Essential hypertension    · Follow-up visit in 3 months Evaluation and Treatment  Follow-up  Status: Complete   Done: 60QAX1659  Health Maintenance    · Call [phone] if: You have any warning signs of skin cancer ; Status:Complete;    Done: 71BRC7072   · Call 911 if: You experience a new kind of chest pain (angina) or pressure ;  Status:Complete;   Done: 95KCD0386   · Always use a seat belt and shoulder strap when riding or driving a motor vehicle ;  Status:Complete;   Done: 42NBD6949   · Begin a limited exercise program ; Status:Complete;   Done: 84UCH4375   · Begin or continue regular aerobic exercise  Gradually work up to at least 3 sessions of 30  minutes of exercise a week ; Status:Complete;   Done: 05RLD3987   · Decreasing the stress in your life may help your condition improve ; Status:Complete;    Done: 06ZYK0244   · Eat a low fat and low cholesterol diet ; Status:Complete;   Done: 20QEV3856   · Eat no more than 30 grams of fat per day ; Status:Complete;   Done: 73IYW2250   · Regular aerobic exercise can help reduce stress ; Status:Complete;   Done: 32JMM4261   · Some eating tips that can help you lose weight ; Status:Complete;   Done: 63TZS7059   · Stretch and warm up your muscles during the first 10 minutes , then cool down your  muscles for the last 10 minutes of exercise ; Status:Complete;   Done: 93HJC8903   · Use a sun block product with an SPF of 15 or more ; Status:Complete;   Done:  75ACV3077   · We encourage all of our patients to exercise regularly  30 minutes of exercise or physical  activity five or more days a week is recommended for children and adults ;  Status:Complete;   Done: 29OHW3715   · We recommend routine visits to a dentist ; Status:Complete;   Done: 57KTP3026    Discussion/Summary  health maintenance visit Currently, he eats a healthy diet and has an adequate exercise regimen   Prostate cancer screening: the risks and benefits of prostate cancer screening were discussed and prostate cancer screening is current  Testicular cancer screening: the risks and benefits of testicular cancer screening were discussed and testicular cancer screening is current  Colorectal cancer screening: the risks and benefits of colorectal cancer screening were discussed and colonoscopy has been ordered  Screening lab work includes glucose  The risks and benefits of immunizations were discussed and immunizations will be given as outlined in the orders  Advice and education were given regarding nutrition, aerobic exercise, cardiovascular risk reduction, helmet use, weight loss, sunscreen use, advanced directive planning and vitamin D supplements  [facility] had a normal exam today in the office  He is stable on his current medications  His lab work was good  We will see him back as scheduled  Possible side effects of new medications were reviewed with the patient/guardian today  The treatment plan was reviewed with the patient/guardian  The patient/guardian understands and agrees with the treatment plan      Chief Complaint  physical exam, 55 yo  History of Present Illness  HM, Adult Male: The patient is being seen for a health maintenance evaluation  The last health maintenance visit was 1 year(s) ago  General Health: The patient's health since the last visit is described as good  He has regular dental visits  He denies vision problems  Vision care includes wearing glasses and an eye examination within the last year  Lifestyle:  He consumes a diverse and healthy diet  He does not have any weight concerns  He exercises regularly  He does not use tobacco  He consumes alcohol  He reports occasional alcohol use  He denies drug use  Screening: cancer screening reviewed and current  metabolic screening reviewed and current  risk screening reviewed and current     HPI: David Corey is a 30-year-old male who presents today for a complete physical  He had a colonoscopy on 12/04/2017  He is having umbilical hernia repair performed on 12/12/2017  He also had a recent EGD  The patient denies any chest pain, shortness of breath, or palpitations  There is no edema  There are no headaches or visual changes  There is no lightheadedness, dizziness, or fainting spells  There is no sickness  There are no GI problems  There are no fevers or chills  He is seeing his allergist tomorrow  Review of Systems    Constitutional: No fever or chills, feels well, no tiredness, no recent weight gain or weight loss  Eyes: No complaints of eye pain, no red eyes, no discharge from eyes, no itchy eyes  ENT: no complaints of earache, no hearing loss, no nosebleeds, no nasal discharge, no sore throat, no hoarseness  Cardiovascular: No complaints of slow heart rate, no fast heart rate, no chest pain, no palpitations, no leg claudication, no lower extremity  Respiratory: No complaints of shortness of breath, no wheezing, no cough, no SOB on exertion, no orthopnea or PND  Gastrointestinal: No complaints of abdominal pain, no constipation, no nausea or vomiting, no diarrhea or bloody stools  Genitourinary: No complaints of dysuria, no incontinence, no hesitancy, no nocturia, no genital lesion, no testicular pain  Musculoskeletal: No complaints of arthralgia, no myalgias, no joint swelling or stiffness, no limb pain or swelling  Integumentary: No complaints of skin rash or skin lesions, no itching, no skin wound, no dry skin  Neurological: No compliants of headache, no confusion, no convulsions, no numbness or tingling, no dizziness or fainting, no limb weakness, no difficulty walking  Psychiatric: Is not suicidal, no sleep disturbances, no anxiety or depression, no change in personality, no emotional problems     Endocrine: No complaints of proptosis, no hot flashes, no muscle weakness, no erectile dysfunction, no deepening of the voice, no feelings of weakness  Hematologic/Lymphatic: No complaints of swollen glands, no swollen glands in the neck, does not bleed easily, no easy bruising  Active Problems    1  Allergic angioedema, initial encounter (995 1) (T78 3XXA)   2  Allergic rhinitis (477 9) (J30 9)   3  Chronic bilateral low back pain with bilateral sciatica (724 2,724 3,338 29)   (M54 42,M54 41,G89 29)   4  Chronic myofascial pain (729 1,338 29) (M79 1,G89 29)   5  Enlarged lymph node in neck (785 6) (R59 0)   6  Esophageal reflux (530 81) (K21 9)   7  Essential hypertension (401 9) (I10)   8  Greater trochanteric bursitis of right hip (726 5) (M70 61)   9  Hemangioma of skin (228 01) (D18 01)   10  Herniated lumbar intervertebral disc (722 10) (M51 26)   11  Lumbar spondylolysis (738 4) (M43 06)   12  Multiple pigmented nevi (216 9) (D22 9)   13  Needs flu shot (V04 81) (Z23)   14  Obstructive sleep apnea syndrome (327 23) (G47 33)   15  Overweight (278 02) (E66 3)   16  Pain syndrome, chronic (338 4) (G89 4)   17  Primary localized osteoarthritis of right hip (715 15) (M16 11)   18  Primary localized osteoarthritis of right knee (715 16) (M17 11)   19  Rectus diastasis (728 84) (M62 08)   20  Sebaceous cyst (706 2) (L72 3)   21  Umbilical hernia (443 1) (K42 9)   22   Umbilical hernia without obstruction or gangrene (553 1) (K42 9)    Past Medical History    · History of Abdominal pain, RLQ (right lower quadrant) (789 03) (R10 31)   · History of Abdominal pain, RUQ (789 01) (R10 11)   · History of Acute bronchitis (466 0) (J20 9)   · History of Acute maxillary sinusitis (461 0) (J01 00)   · History of Degenerative joint disease of knee, right (715 96) (M17 9)   · History of Dysfunction of both eustachian tubes (381 81) (H69 83)   · History of Epicondylitis, lateral, right (726 32) (M77 11)   · History of Esophagitis, reflux (530 11) (K21 0)   · History of acute pharyngitis (V12 69) (Z87 09)   · History of acute sinusitis (V12 69) (Z87 09)   · History of chronic sinusitis (V12 69) (Z87 09)   · History of diarrhea (V12 79) (H05 885)   · History of Lyme disease (V12 09) (Z86 19)   · History of neck pain (V13 59) (Z87 39)   · History of paronychia of finger (V13 3) (Z87 2)   · History of squamous cell carcinoma of skin (V10 83) (Z85 828)   · History of upper respiratory infection (V12 09) (Z87 09)   · History of viral warts (V12 09) (Z86 19)   · History of Ingrowing toenail (703 0) (L60 0)   · History of Lump of skin (782 2) (R22 9)   · History of Neck Strain (847 0)   · History of Pain of right sacroiliac joint (724 6) (M53 3)   · History of Paronychia Of The Left First Toe (681 11)   · History of Recurrent sinusitis (473 9) (J32 9)   · History of Swelling of right knee joint (719 06) (M25 461)   · History of Viral URI (465 9) (J06 9,B97 89)    Surgical History    · History of Hernia Repair   · History of Knee Surgery   · History of Knee Surgery    Family History  Mother    · Family history of malignant neoplasm (V16 9) (Z80 9)  Family History    · Family history of Back Pain   · Family history of Cancer   · Family history of Hypertension (V17 49)    Social History    · Alcohol Use (History)   · Always uses seat belt   · Currently working   ·    · Never a smoker   · No drug use   · Occasional alcohol use   · Occupation   · 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 [employer]  · Two children    Current Meds   1  AmLODIPine Besylate 5 MG Oral Tablet; Take 1 tablet daily; Therapy: 13WWJ5678 to (Stefanie Pugh)  Requested for: 94QIW1291; Last   Rx:05Oct2017 Ordered   2  Claritin 10 MG Oral Capsule; Take one tablet daily; Therapy: 52XHU0720 to (Mary Collier) Recorded   3  Nasonex 50 MCG/ACT Nasal Suspension; USE 2 SPRAYS IN EACH NOSTRIL ONCE   DAILY; Therapy: 09Apr2015 to (Last Rx:48Vgx3667)  Requested for: 20Oct2015 Ordered   4  Protonix 40 MG Oral Tablet Delayed Release; Take 1 tablet daily;    Therapy: (Recorded:98Lvo1527) to Recorded    Allergies    1  Lisinopril TABS   2  Penicillins    Vitals   Recorded: 36Dur7251 03:58PM Recorded: 97HTD8105 03:29PM   Heart Rate 64 60   Respiration  15   Systolic 930 736, RUE, Sitting   Diastolic 80 80, RUE, Sitting   Height  5 ft 8 in   Weight  195 lb    BMI Calculated  29 65   BSA Calculated  2 02     Physical Exam    Constitutional   General appearance: No acute distress, well appearing and well nourished  Eyes   Conjunctiva and lids: No erythema, swelling or discharge  Pupils and irises: Equal, round, reactive to light  Ophthalmoscopic examination: Normal fundi and optic discs  Ears, Nose, Mouth, and Throat   External inspection of ears and nose: Normal     Otoscopic examination: Tympanic membranes translucent with normal light reflex  Canals patent without erythema  Hearing: Normal     Nasal mucosa, septum, and turbinates: Normal without edema or erythema  Lips, teeth, and gums: Normal, good dentition  Oropharynx: Normal with no erythema, edema, exudate or lesions  Neck   Neck: Supple, symmetric, trachea midline, no masses  Thyroid: Normal, no thyromegaly  Pulmonary   Respiratory effort: No increased work of breathing or signs of respiratory distress  Percussion of chest: Normal     Palpation of chest: Normal     Auscultation of lungs: Clear to auscultation  Cardiovascular   Palpation of heart: Normal PMI, no thrills  Auscultation of heart: Normal rate and rhythm, normal S1 and S2, no murmurs  Carotid pulses: 2+ bilaterally  Abdominal aorta: Normal     Femoral pulses: 2+ bilaterally  Pedal pulses: 2+ bilaterally  Examination of extremities for edema and/or varicosities: Normal     Chest   Breasts: Normal, no dimpling or skin changes appreciated  Palpation of breasts and axillae: Normal, no masses palpated  Chest: Normal     Abdomen   Abdomen: Non-tender, no masses      Liver and spleen: No hepatomegaly or splenomegaly  Examination for hernias: No hernias appreciated  Anus, perineum, and rectum: Normal sphincter tone, no masses, no prolapse  Stool sample for occult blood: Negative  Genitourinary Deferred-the patient recently saw his urologist    Lymphatic   Palpation of lymph nodes in neck: No lymphadenopathy  Palpation of lymph nodes in axillae: No lymphadenopathy  Palpation of lymph nodes in groin: No lymphadenopathy  Palpation of lymph nodes in other areas: No lymphadenopathy  Musculoskeletal   Gait and station: Normal     Inspection/palpation of digits and nails: Normal without clubbing or cyanosis  Inspection/palpation of joints, bones, and muscles: Normal     Range of motion: Normal     Stability: Normal     Muscle strength/tone: Normal     Skin   Skin and subcutaneous tissue: Normal without rashes or lesions  Palpation of skin and subcutaneous tissue: Normal turgor  Neurologic   Cranial nerves: Cranial nerves 2-12 intact  Reflexes: 2+ and symmetric  Sensation: No sensory loss  Psychiatric   Judgment and insight: Normal     Orientation to person, place and time: Normal     Recent and remote memory: Intact  Mood and affect: Normal        Results/Data  (1) BASIC METABOLIC PROFILE 02TSM6559 12:25PM Walter Araiza     Test Name Result Flag Reference   GLUCOSE,RANDM 109 mg/dL     If the patient is fasting, the ADA then defines impaired fasting glucose as > 100 mg/dL and diabetes as > or equal to 123 mg/dL  Specimen collection should occur prior to Sulfasalazine administration due to the potential for falsely depressed results  Specimen collection should occur prior to Sulfapyridine administration due to the potential for falsely elevated results     SODIUM 140 mmol/L  136-145   POTASSIUM 4 2 mmol/L  3 5-5 3   CHLORIDE 108 mmol/L  100-108   CARBON DIOXIDE 25 mmol/L  21-32   ANION GAP (CALC) 7 mmol/L  4-13   BLOOD UREA NITROGEN 15 mg/dL  5-25   CREATININE 1 09 mg/dL 0  60-1 30   Standardized to IDMS reference method   CALCIUM 8 5 mg/dL  8 3-10 1   eGFR 76 ml/min/1 73sq m     This is a patient instruction: Patient fasting for 8 hours or longer recommended  National Kidney Disease Education Program recommendations are as follows:  GFR calculation is accurate only with a steady state creatinine  Chronic Kidney disease less than 60 ml/min/1 73 sq  meters  Kidney failure less than 15 ml/min/1 73 sq  meters  (1) HEMOGLOBIN A1C 74XPH3131 12:19PM [facility]     Test Name Result Flag Reference   HEMOGLOBIN A1C 5 5 %  4 2-6 3   EST  AVG  GLUCOSE 111 mg/dl       (1) TSH 27Oct2017 08:46AM [facility]   TW Order Number: [id number]     Test Name Result Flag Reference   TSH 2 460 uIU/mL  0 358-3 740   Patients undergoing fluorescein dye angiography may retain small amounts of fluorescein in the body for 48-72 hours post procedure  Samples containing fluorescein can produce falsely depressed TSH values  If the patient had this procedure,a specimen should be resubmitted post fluorescein clearance       (1) HEP C ANTIBODY 27Oct2017 08:46AM [facility] TW Order Number: [id number]     Test Name Result Flag Reference   HEPATITIS C ANTIBODY Non-reactive  Non-reactive     (1) URINALYSIS w URINE C/S REFLEX (will reflex a microscopy if leukocytes, occult blood, or nitrites are not within normal limits) 27Oct2017 08:46AM [facility]   TW Order Number: [id number]     Test Name Result Flag Reference   COLOR Yellow     CLARITY Clear     PH UA 7 0  4 5-8 0   LEUKOCYTE ESTERASE UA Negative  Negative   NITRITE UA Negative  Negative   PROTEIN UA Negative mg/dl  Negative   GLUCOSE UA Negative mg/dl  Negative   KETONES UA Negative mg/dl  Negative   UROBILINOGEN UA 0 2 E U /dl  0 2, 1 0 E U /dl   BILIRUBIN UA Negative  Negative   BLOOD UA Negative  Negative   SPECIFIC GRAVITY UA 1 010  1 003-1 030       Procedure    Procedure: Visual Acuity Test    Inforrmation supplied by a Snellen chart  Results: 20/20 in the right eye with corrective device, 20/20 in the left eye with corrective device Wears glasses      Health Management  History of Colon cancer screening   COLONOSCOPY; every 5 years; Last 96RCH4475;  Next Due: 42Xsu3401; Near Due    Future Appointments    Date/Time Provider Specialty Site   12/12/2017 08:30 AM Adan Ingram MD General Surgery [address] OR   12/28/2017 09:15 AM Adan Ingram MD General Surgery [facility]     Signatures   Electronically signed by : Rj Dailey DO; Dec  8 2017  1:48AM EST                       (Author)

## 2018-01-24 VITALS — HEIGHT: 71 IN | BODY MASS INDEX: 27.58 KG/M2 | WEIGHT: 197 LBS | TEMPERATURE: 99.2 F

## 2018-01-24 VITALS
RESPIRATION RATE: 15 BRPM | WEIGHT: 195 LBS | DIASTOLIC BLOOD PRESSURE: 80 MMHG | SYSTOLIC BLOOD PRESSURE: 124 MMHG | HEART RATE: 64 BPM | BODY MASS INDEX: 29.55 KG/M2 | HEIGHT: 68 IN

## 2018-01-24 VITALS — WEIGHT: 197 LBS | HEIGHT: 68 IN | TEMPERATURE: 98.7 F | BODY MASS INDEX: 29.86 KG/M2

## 2018-03-05 ENCOUNTER — TELEPHONE (OUTPATIENT)
Dept: PAIN MEDICINE | Facility: CLINIC | Age: 58
End: 2018-03-05

## 2018-03-05 NOTE — TELEPHONE ENCOUNTER
Pt called the [facility] office stating that he is having hip pain  He did have an injection on 7/27/17  He also had hernia surgery December 2017  He was not sure if he could have another injection or if an office visit is needed first  He can be reached at [phone]  Thank You!

## 2018-03-06 NOTE — TELEPHONE ENCOUNTER
S/w pt, confirmed hernia surgery in December States he is going for PT and exercising now  Noticing that with his increase in exercise, his pain is increasing as well  Pt c/o pain in R groin, ant thigh and buttock  Per pt, pain is worst at rest / at night  C/o cramping during the day, maia while driving  Pt states he has + relief while exercising  Questioning an injection or if an ov is necessary  Pt confirmed medications per epic  States he is not taking advil anymore, currently taking meloxicam  Denied any other changes to his health or medications  Advised pt, will d/w Dr Clair Ivey and cb to advise  Pt verbalized understandign and appreciation

## 2018-03-06 NOTE — TELEPHONE ENCOUNTER
Pt left message on voicemail 3/5/18 stating he is returning call please call pt back at [phone]

## 2018-03-13 RX ORDER — LORATADINE 10 MG/1
1 CAPSULE, LIQUID FILLED ORAL DAILY
COMMUNITY
Start: 2017-10-05 | End: 2021-05-13

## 2018-03-13 RX ORDER — CALCIPOTRIENE 50 UG/G
CREAM TOPICAL
Refills: 0 | COMMUNITY
Start: 2018-03-02 | End: 2019-06-03

## 2018-03-13 RX ORDER — FLUOROURACIL 50 MG/G
CREAM TOPICAL
COMMUNITY
Start: 2018-03-02 | End: 2019-06-03

## 2018-03-14 ENCOUNTER — OFFICE VISIT (OUTPATIENT)
Dept: PAIN MEDICINE | Facility: CLINIC | Age: 58
End: 2018-03-14
Payer: COMMERCIAL

## 2018-03-14 ENCOUNTER — APPOINTMENT (OUTPATIENT)
Dept: RADIOLOGY | Facility: CLINIC | Age: 58
End: 2018-03-14
Payer: COMMERCIAL

## 2018-03-14 VITALS
TEMPERATURE: 98 F | HEART RATE: 60 BPM | BODY MASS INDEX: 29.79 KG/M2 | DIASTOLIC BLOOD PRESSURE: 76 MMHG | WEIGHT: 189.8 LBS | HEIGHT: 67 IN | SYSTOLIC BLOOD PRESSURE: 118 MMHG

## 2018-03-14 DIAGNOSIS — G89.29 CHRONIC LEFT-SIDED LOW BACK PAIN WITHOUT SCIATICA: ICD-10-CM

## 2018-03-14 DIAGNOSIS — M54.50 CHRONIC LEFT-SIDED LOW BACK PAIN WITHOUT SCIATICA: ICD-10-CM

## 2018-03-14 DIAGNOSIS — M54.50 CHRONIC LEFT-SIDED LOW BACK PAIN WITHOUT SCIATICA: Primary | ICD-10-CM

## 2018-03-14 DIAGNOSIS — G89.29 CHRONIC LEFT-SIDED LOW BACK PAIN WITHOUT SCIATICA: Primary | ICD-10-CM

## 2018-03-14 DIAGNOSIS — M25.551 RIGHT HIP PAIN: ICD-10-CM

## 2018-03-14 PROCEDURE — 99214 OFFICE O/P EST MOD 30 MIN: CPT | Performed by: ANESTHESIOLOGY

## 2018-03-14 PROCEDURE — 72110 X-RAY EXAM L-2 SPINE 4/>VWS: CPT

## 2018-03-14 NOTE — PROGRESS NOTES
Assessment:  1  Chronic left-sided low back pain without sciatica    2  Right hip pain        Plan: At this point as the patient is improving will obtain lumbar radiographs trial any acute injury regarding his left-sided low back pain regarding his right hip and groin pain he should keep up with the exercises through physical therapy  In addition I did provide lumbar stabilization exercises reviewed them with the patient patient is in agreement if his pain flares up we will consider repeat hip joint injection possible facet joint injection but will re-evaluate  25 minutes was spent with the patient greater than 50% of the time counseling/coordinating care    My impressions and treatment recommendations were discussed in detail with the patient who verbalized understanding and had no further questions  Discharge instructions were provided  I personally saw and examined the patient and I agree with the above discussed plan of care  Orders Placed This Encounter   Procedures    XR spine lumbar minimum 4 views non injury     Standing Status:   Future     Standing Expiration Date:   3/14/2022     Scheduling Instructions:      Bring along any outside films relating to this procedure  Order Specific Question:   Reason for Exam:     Answer:   LBP     New Medications Ordered This Visit   Medications    fluorouracil (EFUDEX) 5 % cream    Loratadine (CLARITIN) 10 MG CAPS     Sig: Take 1 tablet by mouth daily    calcipotriene (DOVONEX) 0 005 % cream     Sig: APPLY TO SCALP IN THE MORNING     Refill:  0       History of Present Illness:    Kyra Mccarthy is a 56 y o  male who was last seen in July of 2017 for right hip joint injection  Approximately 3 weeks ago his pain flared up into his groin and anterior thigh however over the past week the settled down    He does experience some left-sided low back pain occasional groin pain he has been undergoing physical therapy overall he is    I have personally reviewed and/or updated the patient's past medical history, past surgical history, family history, social history, current medications, allergies, and vital signs today  Review of Systems:    Review of Systems   Constitutional: Negative for fever and unexpected weight change  HENT: Negative for trouble swallowing  Eyes: Negative for visual disturbance  Respiratory: Negative for shortness of breath and wheezing  Cardiovascular: Negative for chest pain and palpitations  Gastrointestinal: Negative for constipation, diarrhea, nausea and vomiting  Endocrine: Negative for cold intolerance, heat intolerance and polydipsia  Genitourinary: Negative for difficulty urinating and frequency  Musculoskeletal: Positive for joint swelling (STIFFNESS)  Negative for arthralgias, gait problem and myalgias  Skin: Negative for rash  Neurological: Negative for dizziness, seizures, syncope, weakness and headaches  Hematological: Does not bruise/bleed easily  Psychiatric/Behavioral: Negative for dysphoric mood  All other systems reviewed and are negative        Patient Active Problem List   Diagnosis   (none) - all problems resolved or deleted       Past Medical History:   Diagnosis Date    Arthritis     Fingers, lower back ,right hip    Chronic pain     CPAP (continuous positive airway pressure) dependence     GERD (gastroesophageal reflux disease)     Hemangioma of skin     Hypertension     Irritable bowel syndrome     Lumbar spondylolysis     Multiple pigmented nevi     Obstructive sleep apnea syndrome     PONV (postoperative nausea and vomiting)     Premature atrial beats     Premature ventricular beats     Rectus diastasis     Sleep apnea     Umbilical hernia        Past Surgical History:   Procedure Laterality Date    HERNIA REPAIR      KNEE ARTHROSCOPY Right     MN REPAIR UMBILICAL XBVH,2+W/D,FBVBI N/A 12/12/2017    Procedure: OPEN UMBILICAL HERNIA REPAIR WITH MESH; Surgeon: Irving Alvarado MD;  Location: [facility];  Service: General    WISDOM TOOTH EXTRACTION Left        Family History   Problem Relation Age of Onset    Arthritis Mother     Heart disease Father     Hypertension Father     No Known Problems Sister     No Known Problems Brother        Social History     Occupational History    Not on file  Social History Main Topics    Smoking status: Never Smoker    Smokeless tobacco: Never Used    Alcohol use 2 4 oz/week     4 Cans of beer per week    Drug use: No    Sexual activity: Not on file       Current Outpatient Prescriptions on File Prior to Visit   Medication Sig    amLODIPine (NORVASC) 5 mg tablet Take 1 tablet by mouth daily    mometasone (NASONEX) 50 mcg/act nasal spray 2 sprays into each nostril daily    pantoprazole (PROTONIX) 40 mg tablet Take 40 mg by mouth daily     No current facility-administered medications on file prior to visit  Allergies   Allergen Reactions    Lisinopril Swelling    Ibuprofen Rash    Penicillins      Other reaction(s): Rash       Physical Exam:    /76   Pulse 60   Temp 98 °F (36 7 °C) (Oral)   Ht 5' 7" (1 702 m)   Wt 86 1 kg (189 lb 12 8 oz)   BMI 29 73 kg/m²     Constitutional:  50-70% improved  normal, well developed, well nourished, alert, in no distress and non-toxic and no overt pain behavior  Eyes: anicteric  HEENT: grossly intact  Neck: supple, symmetric, trachea midline and no masses   Pulmonary:even and unlabored  Cardiovascular:No edema or pitting edema present  Skin:Normal without rashes or lesions and well hydrated  Psychiatric:Mood and affect appropriate  Neurologic:Cranial Nerves II-XII grossly intact  Musculoskeletal:normal  Inspection:  Normal station and gait  Normal lumbar lordotic curve with no significant scoliosis or spinal step-off  Skin intact without erythema  No gross mass or muscle atrophy       Palpation:  There is no tenderness to palpation overlying the sacroiliac joint as well as the ischial bursa bilateral   No significant tenderness over the greater trochanteric bursa bilaterally  Motor/Strength:  5/5 strength in the bilateral lower limbs  The patient is able to heel and/toe walk  Tandem gait is intact  Reflexes: Deep tendon reflex are 2+ and symmetrical bilateral patella and Achilles  Sensation:   Sensation intact to light touch and pinprick in the bilateral lower limbs  Proprioception is intact at bilateral hallux  Maneuvers: Negative bilateral straight leg raising  Negative Vince's maneuver    Range of motion right hip is reduced with increased pain internal external rotation

## 2018-03-19 ENCOUNTER — TELEPHONE (OUTPATIENT)
Dept: PAIN MEDICINE | Facility: CLINIC | Age: 58
End: 2018-03-19

## 2018-03-19 NOTE — TELEPHONE ENCOUNTER
S/w pt, advised of x-ray results  Pt verbalized understanding and appreciation   Will cb prn      ----- Message from Ryan Malcolm DO sent at 3/19/2018 11:13 AM EDT -----  Patient's lumbar radiographs revealed slightly advanced degenerative changes and increased arthritis of the low facet joints

## 2018-04-06 ENCOUNTER — TELEPHONE (OUTPATIENT)
Dept: RADIOLOGY | Facility: CLINIC | Age: 58
End: 2018-04-06

## 2018-04-06 NOTE — TELEPHONE ENCOUNTER
PARISH scheduled for 4/12/2018, pt aware need for , npo 1 hr prior, hold meloxicam 4 days prior, wear comfortable pants, if becomes ill/fever/antbx call to r/s, pt verbalized understanding

## 2018-04-06 NOTE — TELEPHONE ENCOUNTER
Pt calling to see if he should get an injection  Pt states on Monday he went to bend down and felt like his back went out, states he saw his PTX and Chiro but today he bent down to put his pants on and the pain was back  Mid spine goes across both sides to buttocks area      Last injections 7/27/17 RT hip 3/20/2017 was LESI; last f/u on 3/14/2018

## 2018-04-12 ENCOUNTER — HOSPITAL ENCOUNTER (OUTPATIENT)
Dept: RADIOLOGY | Facility: CLINIC | Age: 58
Discharge: HOME | End: 2018-04-12
Admitting: ANESTHESIOLOGY
Payer: COMMERCIAL

## 2018-04-12 VITALS
HEART RATE: 64 BPM | TEMPERATURE: 97.5 F | SYSTOLIC BLOOD PRESSURE: 148 MMHG | RESPIRATION RATE: 18 BRPM | OXYGEN SATURATION: 99 % | DIASTOLIC BLOOD PRESSURE: 99 MMHG

## 2018-04-12 DIAGNOSIS — M51.26 DISPLACEMENT OF LUMBAR INTERVERTEBRAL DISC WITHOUT MYELOPATHY: ICD-10-CM

## 2018-04-12 DIAGNOSIS — M54.16 LUMBAR RADICULOPATHY: ICD-10-CM

## 2018-04-12 PROCEDURE — 62323 NJX INTERLAMINAR LMBR/SAC: CPT | Performed by: ANESTHESIOLOGY

## 2018-04-12 RX ORDER — METHYLPREDNISOLONE ACETATE 80 MG/ML
160 INJECTION, SUSPENSION INTRA-ARTICULAR; INTRALESIONAL; INTRAMUSCULAR; PARENTERAL; SOFT TISSUE ONCE
Status: COMPLETED | OUTPATIENT
Start: 2018-04-12 | End: 2018-04-12

## 2018-04-12 RX ORDER — LIDOCAINE HYDROCHLORIDE 10 MG/ML
5 INJECTION, SOLUTION EPIDURAL; INFILTRATION; INTRACAUDAL; PERINEURAL ONCE
Status: COMPLETED | OUTPATIENT
Start: 2018-04-12 | End: 2018-04-12

## 2018-04-12 RX ADMIN — IOHEXOL 1 ML: 300 INJECTION, SOLUTION INTRAVENOUS at 09:30

## 2018-04-12 RX ADMIN — LIDOCAINE HYDROCHLORIDE 5 ML: 10 INJECTION, SOLUTION EPIDURAL; INFILTRATION; INTRACAUDAL; PERINEURAL at 09:22

## 2018-04-12 RX ADMIN — METHYLPREDNISOLONE ACETATE 160 MG: 80 INJECTION, SUSPENSION INTRA-ARTICULAR; INTRALESIONAL; INTRAMUSCULAR; SOFT TISSUE at 09:22

## 2018-04-12 NOTE — H&P
History of Present Illness:  The patient is a 56 y o  male who presents with complaints of left-sided low back and buttock pain    Patient Active Problem List   Diagnosis   (none) - all problems resolved or deleted       Past Medical History:   Diagnosis Date    Arthritis     Fingers, lower back ,right hip    Chronic pain     CPAP (continuous positive airway pressure) dependence     GERD (gastroesophageal reflux disease)     Hemangioma of skin     Hypertension     Irritable bowel syndrome     Lumbar spondylolysis     Multiple pigmented nevi     Obstructive sleep apnea syndrome     PONV (postoperative nausea and vomiting)     Premature atrial beats     Premature ventricular beats     Rectus diastasis     Sleep apnea     Umbilical hernia        Past Surgical History:   Procedure Laterality Date    HERNIA REPAIR      KNEE ARTHROSCOPY Right     MN REPAIR UMBILICAL SFJR,9+U/E,PXOQB N/A 12/12/2017    Procedure: OPEN UMBILICAL HERNIA REPAIR WITH MESH;  Surgeon: Tiffanie Scott MD;  Location: [facility] OR;  Service: General    WISDOM TOOTH EXTRACTION Left          Current Outpatient Prescriptions:     amLODIPine (NORVASC) 5 mg tablet, Take 1 tablet by mouth daily, Disp: , Rfl:     calcipotriene (DOVONEX) 0 005 % cream, APPLY TO SCALP IN THE MORNING, Disp: , Rfl: 0    fluorouracil (EFUDEX) 5 % cream, , Disp: , Rfl:     Loratadine (CLARITIN) 10 MG CAPS, Take 1 tablet by mouth daily, Disp: , Rfl:     mometasone (NASONEX) 50 mcg/act nasal spray, 2 sprays into each nostril daily, Disp: , Rfl:     pantoprazole (PROTONIX) 40 mg tablet, Take 40 mg by mouth daily, Disp: , Rfl:     Current Facility-Administered Medications:     [COMPLETED] iohexol (OMNIPAQUE) 300 mg/mL injection 100 mL, 100 mL, Epidural, Once, London Yang DO, 1 mL at 04/12/18 0930    Allergies   Allergen Reactions    Lisinopril Swelling    Ibuprofen Rash    Penicillins      Other reaction(s): Rash       Physical Exam:   Vitals: 04/12/18 0857   BP: 144/87   Pulse: 55   Resp: 20   Temp: 97 5 °F (36 4 °C)   SpO2: 97%     General: Awake, Alert, Oriented x 3, Mood and affect appropriate  Respiratory: Respirations even and unlabored  Cardiovascular: Peripheral pulses intact; no edema  Musculoskeletal Exam:  Decreased range of motion lumbar spine    ASA Score: II    Assessment:   1  Displacement of lumbar intervertebral disc without myelopathy    2   Lumbar radiculopathy        Plan: LESI-MELOXICAM

## 2018-04-12 NOTE — DISCHARGE INSTRUCTIONS
Epidural Steroid Injection   WHAT YOU NEED TO KNOW:   An epidural steroid injection (ROCHELLE) is a procedure to inject steroid medicine into the epidural space  The epidural space is between your spinal cord and vertebrae  Steroids reduce inflammation and fluid buildup in your spine that may be causing pain  You may be given pain medicine along with the steroids  ACTIVITY  · Do not drive or operate machinery today  · No strenuous activity today - bending, lifting, etc   · You may resume normal activites starting tomorrow - start slowly and as tolerated  · You may shower today, but no tub baths or hot tubs  · You may have numbness for several hours from the local anesthetic  Please use caution and common sense, especially with weight-bearing activities  CARE OF THE INJECTION SITE  · If you have soreness or pain, apply ice to the area today (20 minutes on/20 minutes off)  · Starting tomorrow, you may use warm, moist heat or ice if needed  · You may have an increase or change in your discomfort for 36-48 hours after your treatment  · Apply ice and continue with any pain medication you have been prescribed  · Notify [facility] if you have any of the following: redness, drainage, swelling, headache, stiff neck or fever above 100°F     SPECIAL INSTRUCTIONS  · Our office will contact you in approximately 7 days for a progress report  MEDICATIONS  · Continue to take all routine medications  · Our office may have instructed you to hold some medications  If you have a problem specifically related to your procedure, please call our office at [phone]  Problems not related to your procedure should be directed to your primary care physician

## 2018-04-19 ENCOUNTER — TELEPHONE (OUTPATIENT)
Dept: PAIN MEDICINE | Facility: CLINIC | Age: 58
End: 2018-04-19

## 2018-04-19 NOTE — TELEPHONE ENCOUNTER
Patient states he got a 100% relief has a little pain in the groin area that is a 1-2/10  Patient states 6 hours after the injection he got a pain level of 10/10 in his RT HIP/BUTTOCHS area then the pain level started to go away within the next 3 days  He states he is feeling a lot better         S/P PARISH on 04/12/18 w/SL [place]  No f/u scheduled

## 2018-05-14 ENCOUNTER — TELEPHONE (OUTPATIENT)
Dept: RADIOLOGY | Facility: MEDICAL CENTER | Age: 58
End: 2018-05-14

## 2018-05-14 NOTE — TELEPHONE ENCOUNTER
Patient had L5-S2 LESI towards the left on 4/12/18 and notes full relief to that pain  He states that after that injection, he began to have right-sided pain  Right leg pain down to the foot and hip  He has been doing stretching and sitting on a ball  Using meloxicam and "the muscle relaxer you gave me" without much relief       Patient questions an injection or what to do for this new right-sided pain?     ([phone])

## 2018-05-14 NOTE — TELEPHONE ENCOUNTER
I would schedule LESI to the right and a 2 week follow-up after the injection    Same codes his previous

## 2018-05-16 NOTE — TELEPHONE ENCOUNTER
Pt called back for an update, because he has not heard back from anyone  Pt can be reached at [phone]

## 2018-05-17 NOTE — TELEPHONE ENCOUNTER
Called pt, scheduled LESI toward right on 5/24/18  Pt takes Meloxicam, advised to hold for 4 days, last dose Sat 5/19  Pt denies all other blood thinners/NSAIDs  Went over pre procedure instructions, NPO 1 hr prior, if sick or on abx needs to call to rs, wear loose, comf clothing- no buttons/zippers, needs   Pt verbalized understanding

## 2018-05-24 ENCOUNTER — HOSPITAL ENCOUNTER (OUTPATIENT)
Dept: RADIOLOGY | Facility: CLINIC | Age: 58
Discharge: HOME | End: 2018-05-24
Admitting: ANESTHESIOLOGY
Payer: COMMERCIAL

## 2018-05-24 VITALS
DIASTOLIC BLOOD PRESSURE: 81 MMHG | TEMPERATURE: 98.7 F | SYSTOLIC BLOOD PRESSURE: 129 MMHG | HEART RATE: 59 BPM | OXYGEN SATURATION: 98 % | RESPIRATION RATE: 18 BRPM

## 2018-05-24 DIAGNOSIS — M51.26 HERNIATED NUCLEUS PULPOSUS, LUMBAR: ICD-10-CM

## 2018-05-24 DIAGNOSIS — M25.551 RIGHT HIP PAIN: Primary | ICD-10-CM

## 2018-05-24 DIAGNOSIS — M54.16 LUMBAR RADICULOPATHY: ICD-10-CM

## 2018-05-24 PROCEDURE — 62323 NJX INTERLAMINAR LMBR/SAC: CPT | Performed by: ANESTHESIOLOGY

## 2018-05-24 RX ORDER — METHYLPREDNISOLONE ACETATE 80 MG/ML
160 INJECTION, SUSPENSION INTRA-ARTICULAR; INTRALESIONAL; INTRAMUSCULAR; PARENTERAL; SOFT TISSUE ONCE
Status: COMPLETED | OUTPATIENT
Start: 2018-05-24 | End: 2018-05-24

## 2018-05-24 RX ORDER — LIDOCAINE HYDROCHLORIDE 10 MG/ML
5 INJECTION, SOLUTION EPIDURAL; INFILTRATION; INTRACAUDAL; PERINEURAL ONCE
Status: COMPLETED | OUTPATIENT
Start: 2018-05-24 | End: 2018-05-24

## 2018-05-24 RX ADMIN — IOHEXOL 1 ML: 300 INJECTION, SOLUTION INTRAVENOUS at 14:30

## 2018-05-24 RX ADMIN — LIDOCAINE HYDROCHLORIDE 5 ML: 10 INJECTION, SOLUTION EPIDURAL; INFILTRATION; INTRACAUDAL; PERINEURAL at 14:25

## 2018-05-24 RX ADMIN — METHYLPREDNISOLONE ACETATE 160 MG: 80 INJECTION, SUSPENSION INTRA-ARTICULAR; INTRALESIONAL; INTRAMUSCULAR; SOFT TISSUE at 14:25

## 2018-05-24 NOTE — H&P
History of Present Illness: The patient is a 66 y o  male who presents with complaints of right-sided low back and leg pain      Patient Active Problem List   Diagnosis   (none) - all problems resolved or deleted       Past Medical History:   Diagnosis Date    Arthritis     Fingers, lower back ,right hip    Chronic pain     CPAP (continuous positive airway pressure) dependence     GERD (gastroesophageal reflux disease)     Hemangioma of skin     Hypertension     Irritable bowel syndrome     Lumbar spondylolysis     Multiple pigmented nevi     Obstructive sleep apnea syndrome     PONV (postoperative nausea and vomiting)     Premature atrial beats     Premature ventricular beats     Rectus diastasis     Sleep apnea     Umbilical hernia        Past Surgical History:   Procedure Laterality Date    HERNIA REPAIR      KNEE ARTHROSCOPY Right     AR REPAIR UMBILICAL QGKB,6+J/X,JSEOI N/A 12/12/2017    Procedure: OPEN UMBILICAL HERNIA REPAIR WITH MESH;  Surgeon: Baird Councilman, MD;  Location: [facility] OR;  Service: General    WISDOM TOOTH EXTRACTION Left          Current Outpatient Prescriptions:     amLODIPine (NORVASC) 5 mg tablet, Take 1 tablet by mouth daily, Disp: , Rfl:     calcipotriene (DOVONEX) 0 005 % cream, APPLY TO SCALP IN THE MORNING, Disp: , Rfl: 0    fluorouracil (EFUDEX) 5 % cream, , Disp: , Rfl:     Loratadine (CLARITIN) 10 MG CAPS, Take 1 tablet by mouth daily, Disp: , Rfl:     mometasone (NASONEX) 50 mcg/act nasal spray, 2 sprays into each nostril daily, Disp: , Rfl:     pantoprazole (PROTONIX) 40 mg tablet, Take 40 mg by mouth daily, Disp: , Rfl:     Allergies   Allergen Reactions    Lisinopril Swelling    Ibuprofen Rash    Penicillins      Other reaction(s): Rash       Physical Exam:   Vitals:    05/24/18 1412   BP: 126/84   Pulse: 71   Resp: 18   Temp: 98 7 °F (37 1 °C)   SpO2: 99%     General: Awake, Alert, Oriented x 3, Mood and affect appropriate  Respiratory: Respirations even and unlabored  Cardiovascular: Peripheral pulses intact; no edema  Musculoskeletal Exam:  Decreased range of motion lumbar spine    ASA Score: II    Assessment:   1  Herniated nucleus pulposus, lumbar    2   Lumbar radiculopathy        Plan: LESI TOWARD RT

## 2018-05-24 NOTE — DISCHARGE INSTRUCTIONS
Epidural Steroid Injection   WHAT YOU NEED TO KNOW:   An epidural steroid injection (ROCHELLE) is a procedure to inject steroid medicine into the epidural space  The epidural space is between your spinal cord and vertebrae  Steroids reduce inflammation and fluid buildup in your spine that may be causing pain  You may be given pain medicine along with the steroids  ACTIVITY  · Do not drive or operate machinery today  · No strenuous activity today - bending, lifting, etc   · You may resume normal activites starting tomorrow - start slowly and as tolerated  · You may shower today, but no tub baths or hot tubs  · You may have numbness for several hours from the local anesthetic  Please use caution and common sense, especially with weight-bearing activities  CARE OF THE INJECTION SITE  · If you have soreness or pain, apply ice to the area today (20 minutes on/20 minutes off)  · Starting tomorrow, you may use warm, moist heat or ice if needed  · You may have an increase or change in your discomfort for 36-48 hours after your treatment  · Apply ice and continue with any pain medication you have been prescribed  · Notify [facility] if you have any of the following: redness, drainage, swelling, headache, stiff neck or fever above 100°F     SPECIAL INSTRUCTIONS  · Our office will contact you in approximately 7 days for a progress report  MEDICATIONS  · Continue to take all routine medications  · Our office may have instructed you to hold some medications  If you have a problem specifically related to your procedure, please call our office at [phone]  Problems not related to your procedure should be directed to your primary care physician

## 2018-05-29 ENCOUNTER — TELEPHONE (OUTPATIENT)
Dept: PAIN MEDICINE | Facility: CLINIC | Age: 58
End: 2018-05-29

## 2018-05-29 NOTE — TELEPHONE ENCOUNTER
s/w pt, advised of above  Per pt, R ant thigh pain has resolved, continues w/ R groin pain  Advised pt, per sl, poss R hip inj  Will confirm w/ Dr Dayanara Aguilar and cb to advise  Pt verbalized understanding and appreicaition  R groin pain only  R hip inj? Codes please

## 2018-05-29 NOTE — TELEPHONE ENCOUNTER
----- Message from Rosario Thapa DO sent at 5/29/2018  8:08 AM EDT -----  X-ray of the hip reveals mild degenerative changes if still groin and anterior thigh pain will consider right hip joint injection under fluoroscopic guidance

## 2018-05-31 ENCOUNTER — TELEPHONE (OUTPATIENT)
Dept: PAIN MEDICINE | Facility: CLINIC | Age: 58
End: 2018-05-31

## 2018-06-01 NOTE — TELEPHONE ENCOUNTER
Spoke w/pt  20% relief  Pain level- when sitting 8/10, when standing 4/10  Pt states that he has pain in his buttocks still and also groin pain  Pt is asking if she should have a hip injection done?

## 2018-06-04 ENCOUNTER — TELEPHONE (OUTPATIENT)
Dept: FAMILY MEDICINE CLINIC | Facility: CLINIC | Age: 58
End: 2018-06-04

## 2018-06-04 DIAGNOSIS — I10 ESSENTIAL HYPERTENSION: Primary | ICD-10-CM

## 2018-06-04 RX ORDER — AMLODIPINE BESYLATE 5 MG/1
5 TABLET ORAL DAILY
Qty: 90 TABLET | Refills: 1 | Status: SHIPPED | OUTPATIENT
Start: 2018-06-04 | End: 2018-11-12 | Stop reason: SDUPTHER

## 2018-06-05 NOTE — TELEPHONE ENCOUNTER
Proc scheduled for 6/14/2018, pt aware npo 1 hr prior, wear comfortable pants, if becomes ill or any signs of infection call to r/s, need for   pt verbalized understanding

## 2018-06-13 ENCOUNTER — TELEPHONE (OUTPATIENT)
Dept: RADIOLOGY | Facility: CLINIC | Age: 58
End: 2018-06-13

## 2018-06-13 NOTE — TELEPHONE ENCOUNTER
Rcv'd call from pt this am to r/s his procedure dated for 6/14/2018  Pt states he will be out of town   New date for procedure is 7/6/2018

## 2018-07-05 ENCOUNTER — APPOINTMENT (OUTPATIENT)
Dept: LAB | Facility: HOSPITAL | Age: 58
End: 2018-07-05
Attending: UROLOGY
Payer: COMMERCIAL

## 2018-07-05 ENCOUNTER — TRANSCRIBE ORDERS (OUTPATIENT)
Dept: ADMINISTRATIVE | Facility: HOSPITAL | Age: 58
End: 2018-07-05

## 2018-07-05 DIAGNOSIS — M54.40 LOW BACK PAIN WITH SCIATICA, SCIATICA LATERALITY UNSPECIFIED, UNSPECIFIED BACK PAIN LATERALITY, UNSPECIFIED CHRONICITY: Primary | ICD-10-CM

## 2018-07-05 LAB
BILIRUB UR QL STRIP: NEGATIVE
CLARITY UR: CLEAR
COLOR UR: YELLOW
GLUCOSE UR STRIP-MCNC: NEGATIVE MG/DL
HGB UR QL STRIP.AUTO: NEGATIVE
KETONES UR STRIP-MCNC: NEGATIVE MG/DL
LEUKOCYTE ESTERASE UR QL STRIP: NEGATIVE
NITRITE UR QL STRIP: NEGATIVE
PH UR STRIP.AUTO: 6.5 [PH] (ref 4.5–8)
PROT UR STRIP-MCNC: NEGATIVE MG/DL
SP GR UR STRIP.AUTO: <=1.005 (ref 1–1.03)
UROBILINOGEN UR QL STRIP.AUTO: 0.2 E.U./DL

## 2018-07-05 PROCEDURE — 81003 URINALYSIS AUTO W/O SCOPE: CPT | Performed by: UROLOGY

## 2018-07-06 ENCOUNTER — TELEPHONE (OUTPATIENT)
Dept: PAIN MEDICINE | Facility: CLINIC | Age: 58
End: 2018-07-06

## 2018-07-06 NOTE — TELEPHONE ENCOUNTER
Called patient  States he left message to cx yesterday ~ 1400  Rescheduled right hip injection to 7/12

## 2018-07-12 ENCOUNTER — HOSPITAL ENCOUNTER (OUTPATIENT)
Dept: RADIOLOGY | Facility: CLINIC | Age: 58
Discharge: HOME | End: 2018-07-12
Attending: ANESTHESIOLOGY | Admitting: ANESTHESIOLOGY
Payer: COMMERCIAL

## 2018-07-12 VITALS
SYSTOLIC BLOOD PRESSURE: 132 MMHG | RESPIRATION RATE: 18 BRPM | OXYGEN SATURATION: 99 % | TEMPERATURE: 98.7 F | DIASTOLIC BLOOD PRESSURE: 89 MMHG | HEART RATE: 69 BPM

## 2018-07-12 DIAGNOSIS — M25.551 RIGHT HIP PAIN: ICD-10-CM

## 2018-07-12 DIAGNOSIS — M16.10 ARTHRITIS PAIN OF HIP: ICD-10-CM

## 2018-07-12 PROCEDURE — 20610 DRAIN/INJ JOINT/BURSA W/O US: CPT | Performed by: ANESTHESIOLOGY

## 2018-07-12 PROCEDURE — 77002 NEEDLE LOCALIZATION BY XRAY: CPT

## 2018-07-12 PROCEDURE — 77002 NEEDLE LOCALIZATION BY XRAY: CPT | Performed by: ANESTHESIOLOGY

## 2018-07-12 RX ORDER — METHYLPREDNISOLONE ACETATE 80 MG/ML
80 INJECTION, SUSPENSION INTRA-ARTICULAR; INTRALESIONAL; INTRAMUSCULAR; PARENTERAL; SOFT TISSUE ONCE
Status: COMPLETED | OUTPATIENT
Start: 2018-07-12 | End: 2018-07-12

## 2018-07-12 RX ORDER — LIDOCAINE HYDROCHLORIDE 10 MG/ML
5 INJECTION, SOLUTION EPIDURAL; INFILTRATION; INTRACAUDAL; PERINEURAL ONCE
Status: COMPLETED | OUTPATIENT
Start: 2018-07-12 | End: 2018-07-12

## 2018-07-12 RX ADMIN — METHYLPREDNISOLONE ACETATE 80 MG: 80 INJECTION, SUSPENSION INTRA-ARTICULAR; INTRALESIONAL; INTRAMUSCULAR; SOFT TISSUE at 09:46

## 2018-07-12 RX ADMIN — IOHEXOL 1 ML: 300 INJECTION, SOLUTION INTRAVENOUS at 10:00

## 2018-07-12 RX ADMIN — LIDOCAINE HYDROCHLORIDE 5 ML: 10 INJECTION, SOLUTION EPIDURAL; INFILTRATION; INTRACAUDAL; PERINEURAL at 09:46

## 2018-07-12 RX ADMIN — LIDOCAINE HYDROCHLORIDE 5 ML: 20 INJECTION, SOLUTION EPIDURAL; INFILTRATION; INTRACAUDAL at 09:46

## 2018-07-12 NOTE — DISCHARGE INSTRUCTIONS
Steroid Joint Injection   WHAT YOU NEED TO KNOW:   A steroid joint injection is a procedure to inject steroid medicine into a joint  Steroid medicine decreases pain and inflammation  The injection may also contain an anesthetic (numbing medicine) to decrease pain  It may be done to treat conditions such as arthritis, gout, or carpal tunnel syndrome  The injections may be given in your knee, ankle, shoulder, elbow, wrist, ankle or sacroiliac joint  1  Do not apply heat to any area that is numb  If you have discomfort or soreness at the injection site, you may apply ice today, 20 minutes on and 20 minutes off  Tomorrow you may use ice or warm, moist heat  Do not apply ice or heat directly to the skin  2  You may have an increase or change in the discomfort for 36-48 hours after your treatment  Apply ice and continue with any pain medicine you have been prescribed  3  Do not do anything strenuous today  You may shower, but no tub baths or hot tubs today  You may resume your normal activities tomorrow, but do not overdo it  Resume normal activities slowly when you are feeling better  4  If you experience redness, drainage or swelling at the injection site, or if you develop a fever above 100 degrees, please call [facility] at [phone] or go to the Emergency Room  5  Continue to take all routine medicines prescribed by your primary care physician unless otherwise instructed by our staff  Most blood thinners should be started again according to your regularly scheduled dosing  If you have any questions, please give our office a call  If you have a problem specifically related to your procedure, please call our office at [phone]  Problems not related to your procedure should be directed to your primary care physician

## 2018-07-12 NOTE — H&P
History of Present Illness: The patient is a 66 y o  male who presents with complaints of right hip pain      Patient Active Problem List   Diagnosis    Essential hypertension       Past Medical History:   Diagnosis Date    Arthritis     Fingers, lower back ,right hip    Chronic pain     CPAP (continuous positive airway pressure) dependence     GERD (gastroesophageal reflux disease)     Hemangioma of skin     Hypertension     Irritable bowel syndrome     Lumbar spondylolysis     Multiple pigmented nevi     Obstructive sleep apnea syndrome     PONV (postoperative nausea and vomiting)     Premature atrial beats     Premature ventricular beats     Rectus diastasis     Sleep apnea     Umbilical hernia        Past Surgical History:   Procedure Laterality Date    HERNIA REPAIR      KNEE ARTHROSCOPY Right     MI REPAIR UMBILICAL VCJX,3+B/C,VXGGU N/A 12/12/2017    Procedure: OPEN UMBILICAL HERNIA REPAIR WITH MESH;  Surgeon: Kolby Segura MD;  Location:  MAIN OR;  Service: General    WISDOM TOOTH EXTRACTION Left          Current Outpatient Prescriptions:     amLODIPine (NORVASC) 5 mg tablet, Take 1 tablet (5 mg total) by mouth daily, Disp: 90 tablet, Rfl: 1    calcipotriene (DOVONEX) 0 005 % cream, APPLY TO SCALP IN THE MORNING, Disp: , Rfl: 0    fluorouracil (EFUDEX) 5 % cream, , Disp: , Rfl:     Loratadine (CLARITIN) 10 MG CAPS, Take 1 tablet by mouth daily, Disp: , Rfl:     mometasone (NASONEX) 50 mcg/act nasal spray, 2 sprays into each nostril daily, Disp: , Rfl:     pantoprazole (PROTONIX) 40 mg tablet, Take 40 mg by mouth daily, Disp: , Rfl:     Allergies   Allergen Reactions    Lisinopril Swelling    Ibuprofen Rash    Penicillins      Other reaction(s): Rash       Physical Exam:   Vitals:    07/12/18 0918   BP: 124/82   Pulse: 67   Resp: 18   Temp: 98 7 °F (37 1 °C)   SpO2: 98%     General: Awake, Alert, Oriented x 3, Mood and affect appropriate  Respiratory: Respirations even and unlabored  Cardiovascular: Peripheral pulses intact; no edema  Musculoskeletal Exam:  Decreased range of motion right hip    ASA Score: II    Patient/Chart Verification  Patient ID Verified: Verbal  ID Band Applied: No  Consents Confirmed: Procedural  H&P( within 30 days) Verified: To be obtained in the Pre-Procedure area  Interval H&P(within 24 hr) Complete (required for Outpatients and Surgery Admit only): To be obtained in the Pre-Procedure area  Allergies Reviewed: Yes  Anticoag/NSAID held?: NA  Currently on antibiotics?: No    Assessment:   1  Arthritis pain of hip    2   Right hip pain        Plan: RT HIP INJ

## 2018-07-19 ENCOUNTER — TELEPHONE (OUTPATIENT)
Dept: PAIN MEDICINE | Facility: CLINIC | Age: 58
End: 2018-07-19

## 2018-07-19 NOTE — TELEPHONE ENCOUNTER
Telephone note   Reason for the call    Post Op F/u SL [place]     LMTCB w/pain level and % of relief    1st attempt     S/P RT HIP INJ on 07/12/2018 w/SL in [place]      No F/U at this time

## 2018-08-13 ENCOUNTER — TRANSCRIBE ORDERS (OUTPATIENT)
Dept: ADMINISTRATIVE | Facility: HOSPITAL | Age: 58
End: 2018-08-13

## 2018-08-13 DIAGNOSIS — N28.1 ACQUIRED CYST OF KIDNEY: Primary | ICD-10-CM

## 2018-08-16 ENCOUNTER — HOSPITAL ENCOUNTER (OUTPATIENT)
Dept: ULTRASOUND IMAGING | Facility: CLINIC | Age: 58
Discharge: HOME | End: 2018-08-16
Payer: COMMERCIAL

## 2018-08-16 DIAGNOSIS — N28.1 ACQUIRED CYST OF KIDNEY: ICD-10-CM

## 2018-08-16 PROCEDURE — 76770 US EXAM ABDO BACK WALL COMP: CPT

## 2018-08-21 ENCOUNTER — OFFICE VISIT (OUTPATIENT)
Dept: SLEEP CENTER | Facility: HOSPITAL | Age: 58
End: 2018-08-21
Attending: INTERNAL MEDICINE
Payer: COMMERCIAL

## 2018-08-21 VITALS
HEART RATE: 64 BPM | HEIGHT: 68 IN | SYSTOLIC BLOOD PRESSURE: 110 MMHG | DIASTOLIC BLOOD PRESSURE: 82 MMHG | BODY MASS INDEX: 27.68 KG/M2 | WEIGHT: 182.6 LBS

## 2018-08-21 DIAGNOSIS — G47.33 OSA (OBSTRUCTIVE SLEEP APNEA): Primary | ICD-10-CM

## 2018-08-21 DIAGNOSIS — J31.0 CHRONIC RHINITIS: ICD-10-CM

## 2018-08-21 DIAGNOSIS — F51.12 INSUFFICIENT SLEEP SYNDROME: ICD-10-CM

## 2018-08-21 DIAGNOSIS — K21.9 GASTROESOPHAGEAL REFLUX DISEASE, ESOPHAGITIS PRESENCE NOT SPECIFIED: ICD-10-CM

## 2018-08-21 DIAGNOSIS — R68.2 DRY MOUTH: ICD-10-CM

## 2018-08-21 DIAGNOSIS — R00.2 PALPITATIONS: ICD-10-CM

## 2018-08-21 DIAGNOSIS — I10 ESSENTIAL HYPERTENSION: ICD-10-CM

## 2018-08-21 DIAGNOSIS — G47.10 HYPERSOMNIA: ICD-10-CM

## 2018-08-21 DIAGNOSIS — E66.3 OVERWEIGHT (BMI 25.0-29.9): ICD-10-CM

## 2018-08-21 PROCEDURE — 99214 OFFICE O/P EST MOD 30 MIN: CPT | Performed by: INTERNAL MEDICINE

## 2018-08-21 NOTE — PROGRESS NOTES
Follow-Up Note - Sleep [address]  66 y o  male  :1960  CXO:[id number]    CC: I saw this patient for follow-up in clinic today for his Sleep Disordered Breathing, Coexisting Sleep and Medical Problems  PFSH, Problem List, Medications & Allergies were reviewed in EMR  Interval changes: [none reported ]   He  has a past medical history of Arthritis; Chronic pain; CPAP (continuous positive airway pressure) dependence; GERD (gastroesophageal reflux disease); Hemangioma of skin; Hypertension; Irritable bowel syndrome; Lumbar spondylolysis; Multiple pigmented nevi; Obstructive sleep apnea syndrome; PONV (postoperative nausea and vomiting); Premature atrial beats; Premature ventricular beats; Rectus diastasis; Sleep apnea; and Umbilical hernia  He has a current medication list which includes the following prescription(s): amlodipine, calcipotriene, fluorouracil, loratadine, mometasone, and pantoprazole  ROS: Reviewed (see attached)  [Significant for episodic PVCs and PACs that he feels has increased recently because I am run down  He has back and musculoskeletal aches and pains that disturb sleep  He has had some intentional weight loss  Allergies and acid reflux are controlled ]     HPI:  With respect to compliance, data download shows:  No data was available, but he reports regular use of the device for > 4hours/night  [place] reports  · no  difficulty tolerating PAP;   · significant adverse effects: dry mouth since changing to a nasal interface  (he was experiencing aerophagia when using a full face mask)  · Benefitting from use: sleeping better and unable to sleep without PAP     Sleep Routine: He reports getting 6 hr sleep and is unable to sleep longer because of his aches and pain; he has no difficulty initiating or maintaining sleep   He awakens spontaneously feeling refreshed  He admitted excessive drowsiness but he is not napping   He rated himself at Total score: 13 /24 on the [place] sleepiness scale  Habits:[ reports that he has never smoked  He has never used smokeless tobacco ], [ reports that he drinks about 2 4 oz of alcohol per week  ], [ reports that he does not use drugs  ], Caffeine use: limited[ ], Exercise routine: regular [ ]  EXAM: /82   Pulse 64   Ht 5' 8" (1 727 m)   Wt 82 8 kg (182 lb 9 6 oz)   BMI 27 76 kg/m²      Patient is alert, orientated, cooperative [and in no distress]  Mental state [appears normal]  Craniofacial anatomy normal  There are [no] facial pressure marks or rashes  Neck Circumference: 35 cm  There are no abnormal neck masses  Nasal airway is [patent ]  Mucous membranes appeared normal  The oral airway [is crowded ] [Apart from truncal obesity,] the rest of exam (Heart, Lungs, Abdomen, CNS and Musculoskeletal systems) was unremarkable   IMPRESSION:   1  ZIA (obstructive sleep apnea)  Sleep F/U  - established patient    PAP DME Resupply/Reorder   2  Insufficient sleep syndrome     3  Dry mouth     4  Hypersomnia     5  Essential hypertension     6  Palpitations     7  Chronic rhinitis     8  Gastroesophageal reflux disease, esophagitis presence not specified     9  Overweight (BMI 25 0-29  9)         PLAN:  1  Treatment with  PAP is medically necessary and Billmariano Bo is agreable to continue use  2  We discussed how to improve comfort using PAP, care of equipment, and importance of compliance with therapy  3  H2O Pressure settin cm  I reviewed the results of his last titration study and he continued to have severe events at 14 cm H2O and pressure had to be increased to 16 cm H2O  I advised adding a chin strap  4  Rx provided to replace supplies and Care coordinated with DME provider  5  Strategies for weight reduction were discussed  I also advised allowing sufficient opportunity for sleep  6  Follow-up is advised in [1 Jaine Osman [or sooner if needed] [to monitor progress, compliance and to adjust therapy]  Thank you for allowing me to participate in the care of this patient      Sincerely,    Authenticated electronically by Treva Ontiveros MD on 40/01/99   Board Certified Specialist

## 2018-08-21 NOTE — PROGRESS NOTES
Review of Systems      Genitourinary none   Cardiology palpitations/fluttering feeling in the chest   Gastrointestinal none   Neurology none   Constitutional fatigue   Integumentary none   Psychiatry none   Musculoskeletal joint pain, back pain and sciatica   Pulmonary none   ENT none   Endocrine none   Hematological none

## 2018-10-29 ENCOUNTER — TRANSCRIBE ORDERS (OUTPATIENT)
Dept: ADMINISTRATIVE | Facility: HOSPITAL | Age: 58
End: 2018-10-29

## 2018-10-29 ENCOUNTER — APPOINTMENT (OUTPATIENT)
Dept: LAB | Facility: CLINIC | Age: 58
End: 2018-10-29
Payer: COMMERCIAL

## 2018-10-29 DIAGNOSIS — R30.0 DYSURIA: ICD-10-CM

## 2018-10-29 DIAGNOSIS — R30.0 DYSURIA: Primary | ICD-10-CM

## 2018-10-29 LAB — PSA SERPL-MCNC: 1.5 NG/ML (ref 0–4)

## 2018-10-29 PROCEDURE — 84153 ASSAY OF PSA TOTAL: CPT

## 2018-11-08 ENCOUNTER — TELEPHONE (OUTPATIENT)
Dept: RADIOLOGY | Facility: CLINIC | Age: 58
End: 2018-11-08

## 2018-11-08 NOTE — TELEPHONE ENCOUNTER
Pt scheduled for 11/15/2018, pt aware to verify benefits, we will check for auth, pt aware need for , npo 1 hr prior, if become ill/antbx call to r/s, wear pants without metal like sweatpants/elastic waist, pt verbalized understanding

## 2018-11-08 NOTE — TELEPHONE ENCOUNTER
Received call from pt stating his rt hip is hurting again and would like to know if he can get another hip injection      Last hip inj was on 7/12/2018, last ov was 3/2018

## 2018-11-12 DIAGNOSIS — I10 ESSENTIAL HYPERTENSION: ICD-10-CM

## 2018-11-12 RX ORDER — AMLODIPINE BESYLATE 5 MG/1
5 TABLET ORAL DAILY
Qty: 90 TABLET | Refills: 0 | Status: SHIPPED | OUTPATIENT
Start: 2018-11-12 | End: 2019-01-17 | Stop reason: SDUPTHER

## 2018-11-14 ENCOUNTER — TELEPHONE (OUTPATIENT)
Dept: PAIN MEDICINE | Facility: MEDICAL CENTER | Age: 58
End: 2018-11-14

## 2018-11-14 NOTE — TELEPHONE ENCOUNTER
rtc to pt and he is getting a cold (sorethroat, cough), procedure for 11/15/2018 cx and r/s to 11/29/2018

## 2018-11-14 NOTE — TELEPHONE ENCOUNTER
Pt left a message in voicemail at 9:55 this morning, stating that he is not feeling well and thinks that he should reschedule his procedure for tomorrow  Pt left a call back number of [phone]

## 2018-11-29 ENCOUNTER — HOSPITAL ENCOUNTER (OUTPATIENT)
Dept: RADIOLOGY | Facility: CLINIC | Age: 58
Discharge: HOME | End: 2018-11-29
Attending: ANESTHESIOLOGY | Admitting: ANESTHESIOLOGY
Payer: COMMERCIAL

## 2018-11-29 VITALS
DIASTOLIC BLOOD PRESSURE: 91 MMHG | SYSTOLIC BLOOD PRESSURE: 142 MMHG | RESPIRATION RATE: 18 BRPM | HEART RATE: 58 BPM | OXYGEN SATURATION: 98 % | TEMPERATURE: 98.7 F

## 2018-11-29 DIAGNOSIS — M53.3 COCCYGEAL PAIN: ICD-10-CM
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
## 2018-11-29 DIAGNOSIS — M25.551 RIGHT HIP PAIN: ICD-10-CM

## 2018-11-29 PROCEDURE — 77002 NEEDLE LOCALIZATION BY XRAY: CPT

## 2018-11-29 PROCEDURE — 77002 NEEDLE LOCALIZATION BY XRAY: CPT | Performed by: ANESTHESIOLOGY

## 2018-11-29 PROCEDURE — 20610 DRAIN/INJ JOINT/BURSA W/O US: CPT | Performed by: ANESTHESIOLOGY

## 2018-11-29 RX ORDER — METHYLPREDNISOLONE ACETATE 80 MG/ML
80 INJECTION, SUSPENSION INTRA-ARTICULAR; INTRALESIONAL; INTRAMUSCULAR; PARENTERAL; SOFT TISSUE ONCE
Status: COMPLETED | OUTPATIENT
Start: 2018-11-29 | End: 2018-11-29

## 2018-11-29 RX ORDER — LIDOCAINE HYDROCHLORIDE 10 MG/ML
5 INJECTION, SOLUTION EPIDURAL; INFILTRATION; INTRACAUDAL; PERINEURAL ONCE
Status: COMPLETED | OUTPATIENT
Start: 2018-11-29 | End: 2018-11-29

## 2018-11-29 RX ADMIN — METHYLPREDNISOLONE ACETATE 80 MG: 80 INJECTION, SUSPENSION INTRA-ARTICULAR; INTRALESIONAL; INTRAMUSCULAR; SOFT TISSUE at 09:14

## 2018-11-29 RX ADMIN — LIDOCAINE HYDROCHLORIDE 2 ML: 20 INJECTION, SOLUTION EPIDURAL; INFILTRATION; INTRACAUDAL at 09:14

## 2018-11-29 RX ADMIN — LIDOCAINE HYDROCHLORIDE 3 ML: 10 INJECTION, SOLUTION EPIDURAL; INFILTRATION; INTRACAUDAL; PERINEURAL at 09:12

## 2018-11-29 RX ADMIN — IOHEXOL 1 ML: 300 INJECTION, SOLUTION INTRAVENOUS at 09:13

## 2018-11-29 NOTE — DISCHARGE INSTRUCTIONS

## 2018-11-29 NOTE — H&P
History of Present Illness: The patient is a 66 y o  male who presents with complaints of hip pain      Patient Active Problem List   Diagnosis    Essential hypertension    ZIA (obstructive sleep apnea)    Insufficient sleep syndrome    Hypersomnia    Dry mouth    Palpitations       Past Medical History:   Diagnosis Date    Arthritis     Fingers, lower back ,right hip    Chronic pain     CPAP (continuous positive airway pressure) dependence     GERD (gastroesophageal reflux disease)     Hemangioma of skin     Hypertension     Irritable bowel syndrome     Lumbar spondylolysis     Multiple pigmented nevi     Obstructive sleep apnea syndrome     PONV (postoperative nausea and vomiting)     Premature atrial beats     Premature ventricular beats     Rectus diastasis     Sleep apnea     Umbilical hernia        Past Surgical History:   Procedure Laterality Date    HERNIA REPAIR      KNEE ARTHROSCOPY Right     OK REPAIR UMBILICAL PXTX,6+E/H,KEBTL N/A 12/12/2017    Procedure: OPEN UMBILICAL HERNIA REPAIR WITH MESH;  Surgeon: Koby Henry MD;  Location: [facility] OR;  Service: General    WISDOM TOOTH EXTRACTION Left          Current Outpatient Prescriptions:     amLODIPine (NORVASC) 5 mg tablet, Take 1 tablet (5 mg total) by mouth daily, Disp: 90 tablet, Rfl: 0    calcipotriene (DOVONEX) 0 005 % cream, APPLY TO SCALP IN THE MORNING, Disp: , Rfl: 0    fluorouracil (EFUDEX) 5 % cream, , Disp: , Rfl:     Loratadine (CLARITIN) 10 MG CAPS, Take 1 tablet by mouth daily, Disp: , Rfl:     mometasone (NASONEX) 50 mcg/act nasal spray, 2 sprays into each nostril daily, Disp: , Rfl:     pantoprazole (PROTONIX) 40 mg tablet, Take 40 mg by mouth daily, Disp: , Rfl:     Allergies   Allergen Reactions    Lisinopril Swelling    Ibuprofen Rash    Penicillins      Other reaction(s): Rash       Physical Exam:   Vitals:    11/29/18 0859   BP: 131/89   Pulse: 65   Resp: 20   Temp: 98 7 °F (37 1 °C)   SpO2: 98% General: Awake, Alert, Oriented x 3, Mood and affect appropriate  Respiratory: Respirations even and unlabored  Cardiovascular: Peripheral pulses intact; no edema  Musculoskeletal Exam:   Decreased range of motion  hip  ASA Score: II    Patient/Chart Verification  Patient ID Verified: Verbal  ID Band Applied: No  Consents Confirmed: Procedural  H&P( within 30 days) Verified: To be obtained in the Pre-Procedure area  Interval H&P(within 24 hr) Complete (required for Outpatients and Surgery Admit only): To be obtained in the Pre-Procedure area  Allergies Reviewed: Yes  Anticoag/NSAID held?: No  Currently on antibiotics?: No  Pre-op Lab/Test Results Available: N/A    Assessment:   1   Right hip pain        Plan: RT HIP INJ

## 2018-12-03 ENCOUNTER — TELEPHONE (OUTPATIENT)
Dept: PAIN MEDICINE | Facility: CLINIC | Age: 58
End: 2018-12-03

## 2018-12-03 NOTE — TELEPHONE ENCOUNTER
----- Message from Genie Olson DO sent at 11/30/2018  3:04 PM EST -----  X-ray sacrum and coccyx:  No fracture  The SI joints appear symmetric  Pubic symphysis is maintained

## 2018-12-03 NOTE — TELEPHONE ENCOUNTER
Attempted to call the patient and left a detailed  mom in regards to the previous message  Pt  To CB if any questions

## 2018-12-04 NOTE — TELEPHONE ENCOUNTER
S/w pt, advised of above  Pt verbalized understanding and appreciation  Will await fu call re: Hip inj

## 2018-12-06 ENCOUNTER — TELEPHONE (OUTPATIENT)
Dept: PAIN MEDICINE | Facility: CLINIC | Age: 58
End: 2018-12-06

## 2018-12-06 NOTE — TELEPHONE ENCOUNTER
Telephone note   Reason for the call      Post Op F/u ERIKA Reid with pt and he states that he is doing well and is getting around better still has some trouble sleeping on that side but doing well pain is very low at a 2 and feels that he got a good amount of relief     Advised pt that it can take up to 2 wks to see results from inj and that if pain gets worse to call office for a follow up     S/P RT HIP INJ on 11/29 w/SL in [place]      No follow up at this time

## 2019-01-17 ENCOUNTER — OFFICE VISIT (OUTPATIENT)
Dept: FAMILY MEDICINE CLINIC | Facility: CLINIC | Age: 59
End: 2019-01-17
Payer: COMMERCIAL

## 2019-01-17 VITALS
HEART RATE: 70 BPM | DIASTOLIC BLOOD PRESSURE: 88 MMHG | WEIGHT: 183.8 LBS | HEIGHT: 68 IN | TEMPERATURE: 97.7 F | SYSTOLIC BLOOD PRESSURE: 130 MMHG | BODY MASS INDEX: 27.86 KG/M2

## 2019-01-17 DIAGNOSIS — J01.00 ACUTE NON-RECURRENT MAXILLARY SINUSITIS: Primary | ICD-10-CM

## 2019-01-17 DIAGNOSIS — I10 ESSENTIAL HYPERTENSION: ICD-10-CM

## 2019-01-17 DIAGNOSIS — G58.9 PINCHED NERVE: ICD-10-CM

## 2019-01-17 PROBLEM — M16.11 PRIMARY LOCALIZED OSTEOARTHRITIS OF RIGHT HIP: Status: ACTIVE | Noted: 2017-07-10

## 2019-01-17 PROBLEM — D22.9 MULTIPLE PIGMENTED NEVI: Status: ACTIVE | Noted: 2017-08-14

## 2019-01-17 PROBLEM — M62.08 RECTUS DIASTASIS: Status: ACTIVE | Noted: 2017-08-14

## 2019-01-17 PROBLEM — D18.01 HEMANGIOMA OF SKIN: Status: ACTIVE | Noted: 2017-08-14

## 2019-01-17 PROCEDURE — 3075F SYST BP GE 130 - 139MM HG: CPT | Performed by: FAMILY MEDICINE

## 2019-01-17 PROCEDURE — 3079F DIAST BP 80-89 MM HG: CPT | Performed by: FAMILY MEDICINE

## 2019-01-17 PROCEDURE — 1036F TOBACCO NON-USER: CPT | Performed by: FAMILY MEDICINE

## 2019-01-17 PROCEDURE — 99213 OFFICE O/P EST LOW 20 MIN: CPT | Performed by: FAMILY MEDICINE

## 2019-01-17 PROCEDURE — 3008F BODY MASS INDEX DOCD: CPT | Performed by: FAMILY MEDICINE

## 2019-01-17 RX ORDER — DOXYCYCLINE HYCLATE 100 MG/1
100 CAPSULE ORAL EVERY 12 HOURS SCHEDULED
Qty: 20 CAPSULE | Refills: 0 | Status: SHIPPED | OUTPATIENT
Start: 2019-01-17 | End: 2019-01-27

## 2019-01-17 RX ORDER — AMLODIPINE BESYLATE 5 MG/1
5 TABLET ORAL DAILY
Qty: 90 TABLET | Refills: 1 | Status: SHIPPED | OUTPATIENT
Start: 2019-01-17 | End: 2019-11-15 | Stop reason: SDUPTHER

## 2019-01-17 NOTE — PROGRESS NOTES
Assessment/Plan:  1  Acute maxillary sinusitis-the patient will take the antibiotic as ordered  He will increase his fluids and rest   He can continue with the Vicks vapor rub in the nasal saline spray  He can take Mucinex as needed for cough  He will call if there is no improvement within 1 week or if the condition worsens  2  Pinched nerve in the left upper arm-I do not believe that the patient has any evidence of shingles  I suspect he may have irritated and nervous upper arm perhaps with his exercise  The discomfort is already improving so we will continue to monitor  He will follow up with any worsening symptoms  Diagnoses and all orders for this visit:    Acute non-recurrent maxillary sinusitis  -     doxycycline hyclate (VIBRAMYCIN) 100 mg capsule; Take 1 capsule (100 mg total) by mouth every 12 (twelve) hours for 10 days    Pinched nerve left upper arm    Essential hypertension  -     amLODIPine (NORVASC) 5 mg tablet; Take 1 tablet (5 mg total) by mouth daily       Return if symptoms worsen or fail to improve  Subjective:   Chief Complaint   Patient presents with    Fever     Started  week ago with fevers, sinus pressure and yellow nasal mucous  Burning sensation skin, left upper arm        Patient ID: Chris Jones is a 66 y o  male presents today for for evaluation of sinus pressure, congestion, and a burning sensation of the left upper arm  Chris Jones is a 66 y o  male who presents today for evaluation of sinus pressure, congestion, and a burning sensation of left upper arm  He started 1 week ago with fevers, sinus pressure, yellow nasal mucus  The fever and the chills have subsided  He is still having the congestion and the nasal mucous  He had a sore throat, but that resolved  He did have a fever up to 100 a few days  There is some PND  There is no cough  There is no nausea or vomiting      He has been experiencing a tingling sensation and increased sensitivity of the left upper arm for 3 weeks- it started 2 weeks before the sinus symptoms  It has improved but is still there  It is just localized to the upper arm  There has been no rash  There is no pain or weakness on his arm  There was no injury  There has been no neck pain  URI    This is a new problem  The current episode started 1 to 4 weeks ago  The problem has been gradually worsening  The maximum temperature recorded prior to his arrival was 102 - 102 9 F  The fever has been present for 1 to 2 days  Associated symptoms include congestion, coughing, headaches, rhinorrhea, sinus pain and vomiting  Pertinent negatives include no abdominal pain, chest pain, diarrhea, dysuria, ear pain, joint pain, joint swelling, nausea, neck pain, plugged ear sensation, rash, sneezing, sore throat or wheezing  He has tried antihistamine, increased fluids and acetaminophen for the symptoms  The treatment provided moderate relief       The following portions of the patient's history were reviewed and updated as appropriate: allergies, current medications, past family history, past medical history, past social history, past surgical history and problem list   Patient Active Problem List   Diagnosis    Essential hypertension    ZIA (obstructive sleep apnea)    Insufficient sleep syndrome    Hypersomnia    Dry mouth    Palpitations    Allergic rhinitis    Chronic bilateral low back pain with bilateral sciatica    Chronic myofascial pain    Esophageal reflux    Hemangioma of skin    Herniated lumbar intervertebral disc    Lumbar spondylolysis    Multiple pigmented nevi    Primary localized osteoarthritis of right hip    Primary localized osteoarthritis of right knee    Rectus diastasis     Past Medical History:   Diagnosis Date    Arthritis     Fingers, lower back ,right hip    Chronic pain     CPAP (continuous positive airway pressure) dependence     GERD (gastroesophageal reflux disease)     Hemangioma of skin     Hypertension     Irritable bowel syndrome     Lumbar spondylolysis     Multiple pigmented nevi     Obstructive sleep apnea syndrome     PONV (postoperative nausea and vomiting)     Premature atrial beats     Premature ventricular beats     Rectus diastasis     Sleep apnea     Umbilical hernia      Past Surgical History:   Procedure Laterality Date    HERNIA REPAIR      KNEE ARTHROSCOPY Right     ID REPAIR UMBILICAL UNJK,6+X/Q,[id number] N/A 12/12/2017    Procedure: OPEN UMBILICAL HERNIA REPAIR WITH MESH;  Surgeon: Stephanie Beckett MD;  Location:  MAIN OR;  Service: General    WISDOM TOOTH EXTRACTION Left      Allergies   Allergen Reactions    Lisinopril Swelling    Ibuprofen Rash    Penicillins      Other reaction(s): Rash     Family History   Problem Relation Age of Onset    Arthritis Mother     Heart disease Father     Hypertension Father     No Known Problems Sister     No Known Problems Brother      Social History     Social History    Marital status: /Civil Union     Spouse name: N/A    Number of children: N/A    Years of education: N/A     Occupational History    Not on file       Social History Main Topics    Smoking status: Never Smoker    Smokeless tobacco: Never Used    Alcohol use 2 4 oz/week     4 Cans of beer per week    Drug use: No    Sexual activity: Not on file     Other Topics Concern    Not on file     Social History Narrative    No narrative on file     Current Outpatient Prescriptions on File Prior to Visit   Medication Sig Dispense Refill    fluorouracil (EFUDEX) 5 % cream       Loratadine (CLARITIN) 10 MG CAPS Take 1 tablet by mouth daily      mometasone (NASONEX) 50 mcg/act nasal spray 2 sprays into each nostril daily      pantoprazole (PROTONIX) 40 mg tablet Take 40 mg by mouth daily      [DISCONTINUED] amLODIPine (NORVASC) 5 mg tablet Take 1 tablet (5 mg total) by mouth daily 90 tablet 0    calcipotriene (DOVONEX) 0 005 % cream APPLY TO SCALP IN THE MORNING  0     No current facility-administered medications on file prior to visit  Review of Systems   Constitutional: Negative  HENT: Positive for congestion, rhinorrhea and sinus pain  Negative for ear pain, sneezing and sore throat  Eyes: Negative  Respiratory: Positive for cough  Negative for wheezing  Cardiovascular: Negative  Negative for chest pain  Gastrointestinal: Positive for vomiting  Negative for abdominal pain, diarrhea and nausea  Endocrine: Negative  Genitourinary: Negative  Negative for dysuria  Musculoskeletal: Negative  Negative for joint pain and neck pain  Skin: Negative  Negative for rash  Allergic/Immunologic: Negative  Neurological: Positive for headaches  Hematological: Negative  Psychiatric/Behavioral: Negative  Objective:  Vitals:    01/17/19 0916   BP: 130/88   BP Location: Left arm   Patient Position: Sitting   Cuff Size: Standard   Pulse: 70   Temp: 97 7 °F (36 5 °C)   TempSrc: Tympanic   Weight: 83 4 kg (183 lb 12 8 oz)   Height: 5' 8" (1 727 m)     Body mass index is 27 95 kg/m²  Wt Readings from Last 3 Encounters:   01/17/19 83 4 kg (183 lb 12 8 oz)   08/21/18 82 8 kg (182 lb 9 6 oz)   03/14/18 86 1 kg (189 lb 12 8 oz)     Temp Readings from Last 3 Encounters:   01/17/19 97 7 °F (36 5 °C) (Tympanic)   11/29/18 98 7 °F (37 1 °C) (Oral)   07/12/18 98 7 °F (37 1 °C) (Oral)     BP Readings from Last 3 Encounters:   01/17/19 130/88   11/29/18 142/91   08/21/18 110/82     Pulse Readings from Last 3 Encounters:   01/17/19 70   11/29/18 58   08/21/18 64        Physical Exam   Constitutional: He appears well-developed and well-nourished  HENT:   Nose: Mucosal edema, rhinorrhea and sinus tenderness present  Right sinus exhibits maxillary sinus tenderness  Left sinus exhibits maxillary sinus tenderness  Eyes: Pupils are equal, round, and reactive to light  Neck: Normal range of motion  Neck supple  Cardiovascular: Normal rate, regular rhythm and intact distal pulses  Exam reveals no gallop and no friction rub  No murmur heard  Pulmonary/Chest: Effort normal and breath sounds normal  No respiratory distress  He has no wheezes  He has no rales  He exhibits no tenderness  Abdominal: Soft  Bowel sounds are normal  He exhibits no distension and no mass  There is no tenderness  There is no rebound and no guarding  Lymphadenopathy:     He has cervical adenopathy

## 2019-01-17 NOTE — PATIENT INSTRUCTIONS
Sinusitis   AMBULATORY CARE:   Sinusitis  is inflammation or infection of your sinuses  It is most often caused by a virus  Acute sinusitis may last up to 12 weeks  Chronic sinusitis lasts longer than 12 weeks  Recurrent sinusitis means you have 4 or more times in 1 year  Common symptoms include the following:   · Fever    · Pain, pressure, redness, or swelling around the forehead, cheeks, or eyes    · Thick yellow or green discharge from your nose    · Tenderness when you touch your face over your sinuses    · Dry cough that happens mostly at night or when you lie down    · Headache and face pain that is worse when you lean forward    · Tooth pain, or pain when you chew  Seek care immediately if:   · Your eye and eyelid are red, swollen, and painful  · You cannot open your eye  · You have vision changes, such as double vision  · Your eyeball bulges out or you cannot move your eye  · You are more sleepy than normal, or you notice changes in your ability to think, move, or talk  · You have a stiff neck, a fever, or a bad headache  · You have swelling of your forehead or scalp  Contact your healthcare provider if:   · Your symptoms do not improve after 3 days  · Your symptoms do not go away after 10 days  · You have nausea and are vomiting  · Your nose is bleeding  · You have questions or concerns about your condition or care  Treatment for sinusitis:  Your symptoms may go away on their own  Your healthcare provider may recommend watchful waiting for up to 10 days before starting antibiotics  You may  need any of the following:  · Acetaminophen  decreases pain and fever  It is available without a doctor's order  Ask how much to take and how often to take it  Follow directions  Read the labels of all other medicines you are using to see if they also contain acetaminophen, or ask your doctor or pharmacist  Acetaminophen can cause liver damage if not taken correctly   Do not use more than 4 grams (4,000 milligrams) total of acetaminophen in one day  · NSAIDs , such as ibuprofen, help decrease swelling, pain, and fever  This medicine is available with or without a doctor's order  NSAIDs can cause stomach bleeding or kidney problems in certain people  If you take blood thinner medicine, always ask your healthcare provider if NSAIDs are safe for you  Always read the medicine label and follow directions  · Nasal steroid sprays  may help decrease inflammation in your nose and sinuses  · Decongestants  help reduce swelling and drain mucus in the nose and sinuses  They may help you breathe easier  · Antihistamines  help dry mucus in the nose and relieve sneezing  · Antibiotics  help treat or prevent a bacterial infection  · Take your medicine as directed  Contact your healthcare provider if you think your medicine is not helping or if you have side effects  Tell him or her if you are allergic to any medicine  Keep a list of the medicines, vitamins, and herbs you take  Include the amounts, and when and why you take them  Bring the list or the pill bottles to follow-up visits  Carry your medicine list with you in case of an emergency  Self-care:   · Rinse your sinuses  Use a sinus rinse device to rinse your nasal passages with a saline (salt water) solution or distilled water  Do not use tap water  This will help thin the mucus in your nose and rinse away pollen and dirt  It will also help reduce swelling so you can breathe normally  Ask your healthcare provider how often to do this  · Breathe in steam   Heat a bowl of water until you see steam  Lean over the bowl and make a tent over your head with a large towel  Breathe deeply for about 20 minutes  Be careful not to get too close to the steam or burn yourself  Do this 3 times a day  You can also breathe deeply when you take a hot shower  · Sleep with your head elevated    Place an extra pillow under your head before you go to sleep to help your sinuses drain  · Drink liquids as directed  Ask your healthcare provider how much liquid to drink each day and which liquids are best for you  Liquids will thin the mucus in your nose and help it drain  Avoid drinks that contain alcohol or caffeine  · Do not smoke, and avoid secondhand smoke  Nicotine and other chemicals in cigarettes and cigars can make your symptoms worse  Ask your healthcare provider for information if you currently smoke and need help to quit  E-cigarettes or smokeless tobacco still contain nicotine  Talk to your healthcare provider before you use these products  Prevent the spread of germs that cause sinusitis:  Wash your hands often with soap and water  Wash your hands after you use the bathroom, change a child's diaper, or sneeze  Wash your hands before you prepare or eat food  Follow up with your healthcare provider as directed: You may be referred to an ear, nose, and throat specialist  Write down your questions so you remember to ask them during your visits  © 2017 [facility] Information is for End User's use only and may not be sold, redistributed or otherwise used for commercial purposes  All illustrations and images included in CareNotes® are the copyrighted property of [employer] , [employer]  or Wale Rowe  The above information is an  only  It is not intended as medical advice for individual conditions or treatments  Talk to your doctor, nurse or pharmacist before following any medical regimen to see if it is safe and effective for you

## 2019-03-14 PROBLEM — J32.4 CHRONIC PANSINUSITIS: Status: ACTIVE | Noted: 2019-03-14

## 2019-05-23 ENCOUNTER — TELEPHONE (OUTPATIENT)
Dept: PAIN MEDICINE | Facility: CLINIC | Age: 59
End: 2019-05-23

## 2019-06-03 ENCOUNTER — OFFICE VISIT (OUTPATIENT)
Dept: FAMILY MEDICINE CLINIC | Facility: CLINIC | Age: 59
End: 2019-06-03
Payer: COMMERCIAL

## 2019-06-03 VITALS
HEART RATE: 66 BPM | RESPIRATION RATE: 11 BRPM | SYSTOLIC BLOOD PRESSURE: 130 MMHG | DIASTOLIC BLOOD PRESSURE: 88 MMHG | OXYGEN SATURATION: 98 % | HEIGHT: 68 IN | BODY MASS INDEX: 28.49 KG/M2 | WEIGHT: 188 LBS | TEMPERATURE: 97.5 F

## 2019-06-03 DIAGNOSIS — Z91.89 RISK OF EXPOSURE TO LYME DISEASE: Primary | ICD-10-CM

## 2019-06-03 PROCEDURE — 99213 OFFICE O/P EST LOW 20 MIN: CPT | Performed by: NURSE PRACTITIONER

## 2019-06-03 RX ORDER — DOXYCYCLINE 100 MG/1
100 TABLET ORAL 2 TIMES DAILY
Qty: 21 TABLET | Refills: 0 | Status: SHIPPED | OUTPATIENT
Start: 2019-06-03 | End: 2019-06-06 | Stop reason: SDUPTHER

## 2019-06-04 ENCOUNTER — APPOINTMENT (OUTPATIENT)
Dept: LAB | Facility: CLINIC | Age: 59
End: 2019-06-04
Payer: COMMERCIAL

## 2019-06-04 PROCEDURE — 36415 COLL VENOUS BLD VENIPUNCTURE: CPT | Performed by: NURSE PRACTITIONER

## 2019-06-04 PROCEDURE — 86618 LYME DISEASE ANTIBODY: CPT | Performed by: NURSE PRACTITIONER

## 2019-06-05 LAB
B BURGDOR IGG SER IA-ACNC: 0.14
B BURGDOR IGM SER IA-ACNC: 0.35

## 2019-06-06 ENCOUNTER — APPOINTMENT (OUTPATIENT)
Dept: RADIOLOGY | Facility: CLINIC | Age: 59
End: 2019-06-06
Payer: COMMERCIAL

## 2019-06-06 ENCOUNTER — TELEPHONE (OUTPATIENT)
Dept: FAMILY MEDICINE CLINIC | Facility: CLINIC | Age: 59
End: 2019-06-06

## 2019-06-06 ENCOUNTER — OFFICE VISIT (OUTPATIENT)
Dept: PAIN MEDICINE | Facility: CLINIC | Age: 59
End: 2019-06-06
Payer: COMMERCIAL

## 2019-06-06 ENCOUNTER — TELEPHONE (OUTPATIENT)
Dept: PAIN MEDICINE | Facility: CLINIC | Age: 59
End: 2019-06-06

## 2019-06-06 VITALS
HEIGHT: 68 IN | WEIGHT: 186.6 LBS | SYSTOLIC BLOOD PRESSURE: 130 MMHG | BODY MASS INDEX: 28.28 KG/M2 | HEART RATE: 60 BPM | DIASTOLIC BLOOD PRESSURE: 92 MMHG

## 2019-06-06 DIAGNOSIS — Z91.89 RISK OF EXPOSURE TO LYME DISEASE: ICD-10-CM

## 2019-06-06 DIAGNOSIS — M54.50 CHRONIC RIGHT-SIDED LOW BACK PAIN WITHOUT SCIATICA: ICD-10-CM

## 2019-06-06 DIAGNOSIS — M16.11 ARTHRITIS OF RIGHT HIP: ICD-10-CM

## 2019-06-06 DIAGNOSIS — G89.29 CHRONIC RIGHT-SIDED LOW BACK PAIN WITHOUT SCIATICA: ICD-10-CM

## 2019-06-06 DIAGNOSIS — M16.11 ARTHRITIS OF RIGHT HIP: Primary | ICD-10-CM

## 2019-06-06 PROCEDURE — 72110 X-RAY EXAM L-2 SPINE 4/>VWS: CPT

## 2019-06-06 PROCEDURE — 73502 X-RAY EXAM HIP UNI 2-3 VIEWS: CPT

## 2019-06-06 PROCEDURE — 99214 OFFICE O/P EST MOD 30 MIN: CPT | Performed by: ANESTHESIOLOGY

## 2019-06-06 RX ORDER — DOXYCYCLINE 100 MG/1
100 TABLET ORAL 2 TIMES DAILY
Qty: 21 TABLET | Refills: 0 | Status: SHIPPED | OUTPATIENT
Start: 2019-06-06 | End: 2019-06-17

## 2019-06-10 PROBLEM — J32.0 MAXILLARY SINUSITIS: Status: ACTIVE | Noted: 2019-06-10

## 2019-06-10 PROCEDURE — 87205 SMEAR GRAM STAIN: CPT | Performed by: OTOLARYNGOLOGY

## 2019-06-10 PROCEDURE — 87070 CULTURE OTHR SPECIMN AEROBIC: CPT | Performed by: OTOLARYNGOLOGY

## 2019-06-10 PROCEDURE — 87077 CULTURE AEROBIC IDENTIFY: CPT | Performed by: OTOLARYNGOLOGY

## 2019-06-10 PROCEDURE — 87186 SC STD MICRODIL/AGAR DIL: CPT | Performed by: OTOLARYNGOLOGY

## 2019-06-13 ENCOUNTER — TELEPHONE (OUTPATIENT)
Dept: RADIOLOGY | Facility: CLINIC | Age: 59
End: 2019-06-13

## 2019-06-13 ENCOUNTER — TELEPHONE (OUTPATIENT)
Dept: PAIN MEDICINE | Facility: MEDICAL CENTER | Age: 59
End: 2019-06-13

## 2019-06-17 ENCOUNTER — TELEPHONE (OUTPATIENT)
Dept: PAIN MEDICINE | Facility: CLINIC | Age: 59
End: 2019-06-17

## 2019-06-17 DIAGNOSIS — G89.29 CHRONIC MYOFASCIAL PAIN: Primary | ICD-10-CM

## 2019-06-17 DIAGNOSIS — G89.29 CHRONIC BILATERAL LOW BACK PAIN WITH BILATERAL SCIATICA: ICD-10-CM

## 2019-06-17 DIAGNOSIS — M79.18 CHRONIC MYOFASCIAL PAIN: Primary | ICD-10-CM

## 2019-06-17 DIAGNOSIS — M54.42 CHRONIC BILATERAL LOW BACK PAIN WITH BILATERAL SCIATICA: ICD-10-CM

## 2019-06-17 DIAGNOSIS — M54.41 CHRONIC BILATERAL LOW BACK PAIN WITH BILATERAL SCIATICA: ICD-10-CM

## 2019-06-17 RX ORDER — GABAPENTIN 400 MG/1
CAPSULE ORAL
Qty: 30 CAPSULE | Refills: 1 | Status: SHIPPED | OUTPATIENT
Start: 2019-06-17 | End: 2019-09-03 | Stop reason: ALTCHOICE

## 2019-06-18 ENCOUNTER — TELEPHONE (OUTPATIENT)
Dept: PAIN MEDICINE | Facility: CLINIC | Age: 59
End: 2019-06-18

## 2019-06-19 ENCOUNTER — HOSPITAL ENCOUNTER (OUTPATIENT)
Dept: RADIOLOGY | Facility: CLINIC | Age: 59
Discharge: HOME | End: 2019-06-19
Attending: OBSTETRICS & GYNECOLOGY | Admitting: ANESTHESIOLOGY
Payer: COMMERCIAL

## 2019-06-19 VITALS
RESPIRATION RATE: 20 BRPM | HEART RATE: 64 BPM | TEMPERATURE: 97.5 F | OXYGEN SATURATION: 99 % | DIASTOLIC BLOOD PRESSURE: 95 MMHG | SYSTOLIC BLOOD PRESSURE: 143 MMHG

## 2019-06-19 DIAGNOSIS — M16.11 ARTHRITIS OF RIGHT HIP: ICD-10-CM

## 2019-06-19 PROCEDURE — 77002 NEEDLE LOCALIZATION BY XRAY: CPT

## 2019-06-19 PROCEDURE — 20610 DRAIN/INJ JOINT/BURSA W/O US: CPT | Performed by: ANESTHESIOLOGY

## 2019-06-19 PROCEDURE — 77002 NEEDLE LOCALIZATION BY XRAY: CPT | Performed by: ANESTHESIOLOGY

## 2019-06-19 RX ORDER — METHYLPREDNISOLONE ACETATE 80 MG/ML
80 INJECTION, SUSPENSION INTRA-ARTICULAR; INTRALESIONAL; INTRAMUSCULAR; PARENTERAL; SOFT TISSUE ONCE
Status: COMPLETED | OUTPATIENT
Start: 2019-06-19 | End: 2019-06-19

## 2019-06-19 RX ORDER — LIDOCAINE HYDROCHLORIDE 10 MG/ML
5 INJECTION, SOLUTION EPIDURAL; INFILTRATION; INTRACAUDAL; PERINEURAL ONCE
Status: COMPLETED | OUTPATIENT
Start: 2019-06-19 | End: 2019-06-19

## 2019-06-19 RX ADMIN — LIDOCAINE HYDROCHLORIDE 3 ML: 10 INJECTION, SOLUTION EPIDURAL; INFILTRATION; INTRACAUDAL; PERINEURAL at 09:58

## 2019-06-19 RX ADMIN — METHYLPREDNISOLONE ACETATE 80 MG: 80 INJECTION, SUSPENSION INTRA-ARTICULAR; INTRALESIONAL; INTRAMUSCULAR; SOFT TISSUE at 09:59

## 2019-06-19 RX ADMIN — IOHEXOL 1 ML: 300 INJECTION, SOLUTION INTRAVENOUS at 09:58

## 2019-06-19 RX ADMIN — LIDOCAINE HYDROCHLORIDE 5 ML: 20 INJECTION, SOLUTION EPIDURAL; INFILTRATION; INTRACAUDAL at 09:59

## 2019-06-26 ENCOUNTER — TELEPHONE (OUTPATIENT)
Dept: PAIN MEDICINE | Facility: CLINIC | Age: 59
End: 2019-06-26

## 2019-06-27 ENCOUNTER — HOSPITAL ENCOUNTER (OUTPATIENT)
Dept: RADIOLOGY | Facility: CLINIC | Age: 59
Discharge: HOME | End: 2019-06-27
Attending: ANESTHESIOLOGY | Admitting: ANESTHESIOLOGY
Payer: COMMERCIAL

## 2019-06-27 VITALS
DIASTOLIC BLOOD PRESSURE: 93 MMHG | OXYGEN SATURATION: 100 % | HEART RATE: 72 BPM | SYSTOLIC BLOOD PRESSURE: 134 MMHG | RESPIRATION RATE: 20 BRPM | TEMPERATURE: 98.7 F

## 2019-06-27 DIAGNOSIS — M51.26 DISPLACEMENT OF LUMBAR INTERVERTEBRAL DISC WITHOUT MYELOPATHY: ICD-10-CM

## 2019-06-27 DIAGNOSIS — M54.16 LUMBAR RADICULOPATHY: ICD-10-CM

## 2019-06-27 PROCEDURE — 62323 NJX INTERLAMINAR LMBR/SAC: CPT | Performed by: ANESTHESIOLOGY

## 2019-06-27 RX ORDER — METHYLPREDNISOLONE ACETATE 80 MG/ML
160 INJECTION, SUSPENSION INTRA-ARTICULAR; INTRALESIONAL; INTRAMUSCULAR; PARENTERAL; SOFT TISSUE ONCE
Status: COMPLETED | OUTPATIENT
Start: 2019-06-27 | End: 2019-06-27

## 2019-06-27 RX ORDER — LIDOCAINE HYDROCHLORIDE 10 MG/ML
5 INJECTION, SOLUTION EPIDURAL; INFILTRATION; INTRACAUDAL; PERINEURAL ONCE
Status: COMPLETED | OUTPATIENT
Start: 2019-06-27 | End: 2019-06-27

## 2019-06-27 RX ADMIN — IOHEXOL 1 ML: 300 INJECTION, SOLUTION INTRAVENOUS at 14:03

## 2019-06-27 RX ADMIN — METHYLPREDNISOLONE ACETATE 160 MG: 80 INJECTION, SUSPENSION INTRA-ARTICULAR; INTRALESIONAL; INTRAMUSCULAR; SOFT TISSUE at 14:04

## 2019-06-27 RX ADMIN — LIDOCAINE HYDROCHLORIDE 3 ML: 10 INJECTION, SOLUTION EPIDURAL; INFILTRATION; INTRACAUDAL; PERINEURAL at 14:02

## 2019-06-27 NOTE — H&P
History of Present Illness: The patient is a 64 y o  male who presents with complaints of low back and leg pain      Patient Active Problem List   Diagnosis    Essential hypertension    ZIA (obstructive sleep apnea)    Insufficient sleep syndrome    Hypersomnia    Dry mouth    Palpitations    Allergic rhinitis    Chronic bilateral low back pain with bilateral sciatica    Chronic myofascial pain    Esophageal reflux    Hemangioma of skin    Herniated lumbar intervertebral disc    Lumbar spondylolysis    Multiple pigmented nevi    Primary localized osteoarthritis of right hip    Primary localized osteoarthritis of right knee    Rectus diastasis    Chronic pansinusitis    Maxillary sinusitis    Arthritis of right hip       Past Medical History:   Diagnosis Date    Arthritis     Fingers, lower back ,right hip    Chronic pain     CPAP (continuous positive airway pressure) dependence     GERD (gastroesophageal reflux disease)     Hemangioma of skin     Hypertension     Irritable bowel syndrome     Lumbar spondylolysis     Multiple pigmented nevi     Obstructive sleep apnea syndrome     PONV (postoperative nausea and vomiting)     Premature atrial beats     Premature ventricular beats     Rectus diastasis     Sleep apnea     Umbilical hernia        Past Surgical History:   Procedure Laterality Date    HERNIA REPAIR      KNEE ARTHROSCOPY Right     TX REPAIR UMBILICAL WVOS,6+Q/D,ACIBU N/A 12/12/2017    Procedure: OPEN UMBILICAL HERNIA REPAIR WITH MESH;  Surgeon: Daysi Villagran MD;  Location: [facility] OR;  Service: General    WISDOM TOOTH EXTRACTION Left          Current Outpatient Medications:     amLODIPine (NORVASC) 5 mg tablet, Take 1 tablet (5 mg total) by mouth daily, Disp: 90 tablet, Rfl: 1    gabapentin (NEURONTIN) 400 mg capsule, Take 1 PO HS , Disp: 30 capsule, Rfl: 1    Loratadine (CLARITIN) 10 MG CAPS, Take 1 tablet by mouth daily, Disp: , Rfl:     mometasone (NASONEX) 50 mcg/act nasal spray, 2 sprays into each nostril daily, Disp: , Rfl:     pantoprazole (PROTONIX) 40 mg tablet, Take 40 mg by mouth daily, Disp: , Rfl:     Current Facility-Administered Medications:     iohexol (OMNIPAQUE) 300 mg/mL injection 50 mL, 50 mL, Epidural, Once, London Yang DO    lidocaine (PF) (XYLOCAINE-MPF) 1 % injection 5 mL, 5 mL, Other, Once, London Yang DO    methylPREDNISolone acetate (DEPO-MEDROL) injection 160 mg, 160 mg, Epidural, Once, Sulaiman Cota DO    Allergies   Allergen Reactions    Lisinopril Swelling    Ibuprofen Rash    Penicillins      Other reaction(s): Rash       Physical Exam:   General: Awake, Alert, Oriented x 3, Mood and affect appropriate  Respiratory: Respirations even and unlabored  Cardiovascular: Peripheral pulses intact; no edema  Musculoskeletal Exam:  Decreased range of motion lumbar spine    ASA Score: II         Assessment:   1  Displacement of lumbar intervertebral disc without myelopathy    2   Lumbar radiculopathy        Plan: [facility]

## 2019-06-27 NOTE — DISCHARGE INSTRUCTIONS
Epidural Steroid Injection   WHAT YOU NEED TO KNOW:   An epidural steroid injection (ROCHELLE) is a procedure to inject steroid medicine into the epidural space  The epidural space is between your spinal cord and vertebrae  Steroids reduce inflammation and fluid buildup in your spine that may be causing pain  You may be given pain medicine along with the steroids  ACTIVITY  · Do not drive or operate machinery today  · No strenuous activity today - bending, lifting, etc   · You may resume normal activites starting tomorrow - start slowly and as tolerated  · You may shower today, but no tub baths or hot tubs  · You may have numbness for several hours from the local anesthetic  Please use caution and common sense, especially with weight-bearing activities  CARE OF THE INJECTION SITE  · If you have soreness or pain, apply ice to the area today (20 minutes on/20 minutes off)  · Starting tomorrow, you may use warm, moist heat or ice if needed  · You may have an increase or change in your discomfort for 36-48 hours after your treatment  · Apply ice and continue with any pain medication you have been prescribed  · Notify [facility] if you have any of the following: redness, drainage, swelling, headache, stiff neck or fever above 100°F     SPECIAL INSTRUCTIONS  · Our office will contact you in approximately 7 days for a progress report  MEDICATIONS  · Continue to take all routine medications  · Our office may have instructed you to hold some medications  If you have a problem specifically related to your procedure, please call our office at [phone]  Problems not related to your procedure should be directed to your primary care physician

## 2019-07-05 ENCOUNTER — TELEPHONE (OUTPATIENT)
Dept: PAIN MEDICINE | Facility: CLINIC | Age: 59
End: 2019-07-05

## 2019-07-05 NOTE — TELEPHONE ENCOUNTER
Pt reports 50-60% improvement post inj   Pain level 1-2/10 sitting  Pain level 4-5/10 standing     S/p LESI 6/27

## 2019-09-03 ENCOUNTER — OFFICE VISIT (OUTPATIENT)
Dept: SLEEP CENTER | Facility: HOSPITAL | Age: 59
End: 2019-09-03
Payer: COMMERCIAL

## 2019-09-03 VITALS
SYSTOLIC BLOOD PRESSURE: 128 MMHG | HEART RATE: 76 BPM | BODY MASS INDEX: 29.1 KG/M2 | WEIGHT: 192 LBS | HEIGHT: 68 IN | DIASTOLIC BLOOD PRESSURE: 80 MMHG

## 2019-09-03 DIAGNOSIS — I10 ESSENTIAL HYPERTENSION: ICD-10-CM

## 2019-09-03 DIAGNOSIS — G47.33 OSA (OBSTRUCTIVE SLEEP APNEA): Primary | ICD-10-CM

## 2019-09-03 DIAGNOSIS — R68.2 DRY MOUTH: ICD-10-CM

## 2019-09-03 DIAGNOSIS — J32.4 CHRONIC PANSINUSITIS: ICD-10-CM

## 2019-09-03 DIAGNOSIS — F51.12 INSUFFICIENT SLEEP SYNDROME: ICD-10-CM

## 2019-09-03 DIAGNOSIS — K21.9 GASTROESOPHAGEAL REFLUX DISEASE, ESOPHAGITIS PRESENCE NOT SPECIFIED: ICD-10-CM

## 2019-09-03 DIAGNOSIS — R00.2 PALPITATIONS: ICD-10-CM

## 2019-09-03 DIAGNOSIS — E66.3 OVERWEIGHT (BMI 25.0-29.9): ICD-10-CM

## 2019-09-03 DIAGNOSIS — M54.16 LUMBAR RADICULOPATHY: ICD-10-CM

## 2019-09-03 PROCEDURE — 3074F SYST BP LT 130 MM HG: CPT | Performed by: INTERNAL MEDICINE

## 2019-09-03 PROCEDURE — 3079F DIAST BP 80-89 MM HG: CPT | Performed by: INTERNAL MEDICINE

## 2019-09-03 PROCEDURE — 99214 OFFICE O/P EST MOD 30 MIN: CPT | Performed by: INTERNAL MEDICINE

## 2019-09-03 NOTE — PROGRESS NOTES
Follow-Up Note - Sleep [address]  64 y o  male  :1960  NXX:[id number]    CC: I saw this patient for follow-up in clinic today for his Sleep Disordered Breathing, Coexisting Sleep and Medical Problems  PFSH, Problem List, Medications & Allergies were reviewed in EMR  Interval changes:  He had some sinus surgery May of this year   He  has a past medical history of Arthritis, Chronic pain, CPAP (continuous positive airway pressure) dependence, GERD (gastroesophageal reflux disease), Hemangioma of skin, Hypertension, Irritable bowel syndrome, Lumbar spondylolysis, Multiple pigmented nevi, Obstructive sleep apnea syndrome, PONV (postoperative nausea and vomiting), Premature atrial beats, Premature ventricular beats, Rectus diastasis, Sleep apnea, and Umbilical hernia  He has a current medication list which includes the following prescription(s): amlodipine, loratadine, mometasone, and pantoprazole  ROS: Reviewed (see attached)  Significant for episodic palpitations  Nasal symptoms have improved since he had sinus surgery  Acid reflux is controlled  He has  musculoskeletal aches and pains and some radicular symptoms  DATA REVIEWED:  No data was available, but he reports regular use of the device for > 4hours/night  SUBJECTIVE: Regarding use of PAP, Rudy Richards reports:   · He is experiencing some adverse effects: dry mouth and mask leaks   · He is   benefiting from use: sleeping better and unable to sleep without PAP   Sleep Routine: He reports getting 6 hrs sleep  ; he has no difficulty initiating or maintaining sleep   He awakens spontaneously and feels refreshed  He has excessive drowsiness   He rated himself at Total score: 9 /24 on the [place] sleepiness scale  Habits: reports that he has never smoked  He has never used smokeless tobacco ,  reports that he drinks about 4 0 standard drinks of alcohol per week  ,  reports that he does not use drugs  , Caffeine use: very little , Exercise routine: regular    OBJECTIVE: /80   Pulse 76   Ht 5' 8" (1 727 m)   Wt 87 1 kg (192 lb)   BMI 29 19 kg/m²    Constitutional: Patient is well groomed; well appearing  Skin/Extrem: warm & dry; col & hydration normal; no edema  Psych: cooperativeand in no distress  Mental State appears normal   CNS: Alert, orientated, clear & coherent speech  H&N: EOMI; NC/AT:no facial pressure marks, no rashes  Neck Circumference: 15 75"  ENMT Mucus membranes normal Nasal airway:patent  Oral airway: crowded  Resp:effort is normal CVS: RRR ABD:truncal obesity MSK:Gait normal     ASSESSMENT: Primary Sleep/Secondary(to Medical or Psych conditions) & comorbidities   1  ZIA (obstructive sleep apnea)  PAP DME Resupply/Reorder   2  Insufficient sleep syndrome     3  Dry mouth     4  Chronic pansinusitis     5  Palpitations     6  Essential hypertension     7  Gastroesophageal reflux disease, esophagitis presence not specified     8  Overweight (BMI 25 0-29 9)     9  Lumbar radiculopathy         PLAN:  1  Treatment with  PAP is medically necessary and Cristian Coker is agreable to continue use  2  Care of equipment, methods to improve comfort using PAP and importance of compliance with therapy were discussed  3  Pressure setting: continue 16 cmH2O     4  Rx provided to replace supplies and Care coordinated with DME provider  5  Strategies for weight reduction were discussed  6  Nasal symptoms may improve with regular nasal saline rinse followed by topical nasal steroid if necessary  7  Follow-up is advised in 1 year or sooner if needed to monitor progress, compliance and to adjust therapy  Thank you for allowing me to participate in the care of this patient      Sincerely,    Authenticated electronically by Aaron Lambert MD on 78/07/02   Board Certified Specialist

## 2019-09-03 NOTE — PROGRESS NOTES
Review of Systems      Genitourinary none   Cardiology none   Gastrointestinal none   Neurology none   Constitutional none   Integumentary none   Psychiatry none   Musculoskeletal joint pain and sciatica   Pulmonary none   ENT none   Endocrine none   Hematological none

## 2019-09-03 NOTE — PATIENT INSTRUCTIONS

## 2019-09-09 ENCOUNTER — TELEPHONE (OUTPATIENT)
Dept: PAIN MEDICINE | Facility: MEDICAL CENTER | Age: 59
End: 2019-09-09

## 2019-09-09 ENCOUNTER — TELEPHONE (OUTPATIENT)
Dept: SLEEP CENTER | Facility: CLINIC | Age: 59
End: 2019-09-09

## 2019-09-09 NOTE — TELEPHONE ENCOUNTER
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

## 2019-09-09 NOTE — TELEPHONE ENCOUNTER
Pt left a voicemail @ 10:20am stating that he needs to talk to someone about getting another injection     Pt can be reached at [phone]

## 2019-09-09 NOTE — TELEPHONE ENCOUNTER
LMOM to cb       Note:  6/6/2019 ov - c/o r low back and groin pain  6/19 - R hip inj  6/27 - [facility]

## 2019-09-10 NOTE — TELEPHONE ENCOUNTER
I would proceed with one further LESI and then a follow-up appointment with DG    Same diagnosis codes as previous

## 2019-09-11 NOTE — TELEPHONE ENCOUNTER
Proc scheduled for 9/23/2019 offered sooner but per pt he has a big golf tournament  Pt is aware need for , npo 1 hr prior, wear pants without any metal, if become ill/antbx call to r/s  Pt verbalized understanding

## 2019-09-23 NOTE — TELEPHONE ENCOUNTER
Pt showed up in office today 9/23/2019 for procedure and states he does not have any symptoms and prefers to wait until he really needs the injection  Pt was canceled for his lesi #3 from [facility]

## 2019-11-15 DIAGNOSIS — I10 ESSENTIAL HYPERTENSION: ICD-10-CM

## 2019-11-15 DIAGNOSIS — M16.11 ARTHRITIS OF RIGHT HIP: Primary | ICD-10-CM

## 2019-11-15 RX ORDER — AMLODIPINE BESYLATE 5 MG/1
5 TABLET ORAL DAILY
Qty: 90 TABLET | Refills: 0 | Status: SHIPPED | OUTPATIENT
Start: 2019-11-15 | End: 2019-12-30 | Stop reason: SDUPTHER

## 2019-11-15 RX ORDER — MELOXICAM 15 MG/1
15 TABLET ORAL DAILY
Qty: 30 TABLET | Refills: 0 | Status: SHIPPED | OUTPATIENT
Start: 2019-11-15 | End: 2020-07-06 | Stop reason: SDUPTHER

## 2019-11-15 NOTE — TELEPHONE ENCOUNTER
amLODIPine (NORVASC) 5 mg tablet    MELOXICAM PRN FOR ARTHRITIS    [facility] HAS AN APPT WITH YOU 12/30 (THIS WAS THE SOONEST WE COULD FIND ONE)    [facility]    [phone] ANY QUESTIONS

## 2019-11-26 ENCOUNTER — TRANSCRIBE ORDERS (OUTPATIENT)
Dept: ADMINISTRATIVE | Facility: HOSPITAL | Age: 59
End: 2019-11-26

## 2019-11-26 ENCOUNTER — APPOINTMENT (OUTPATIENT)
Dept: LAB | Facility: CLINIC | Age: 59
End: 2019-11-26
Payer: COMMERCIAL

## 2019-11-26 DIAGNOSIS — N40.1 BENIGN LOCALIZED HYPERPLASIA OF PROSTATE WITH URINARY RETENTION: ICD-10-CM

## 2019-11-26 DIAGNOSIS — R33.8 BENIGN LOCALIZED HYPERPLASIA OF PROSTATE WITH URINARY RETENTION: Primary | ICD-10-CM

## 2019-11-26 DIAGNOSIS — N40.1 BENIGN LOCALIZED HYPERPLASIA OF PROSTATE WITH URINARY RETENTION: Primary | ICD-10-CM

## 2019-11-26 DIAGNOSIS — R33.8 BENIGN LOCALIZED HYPERPLASIA OF PROSTATE WITH URINARY RETENTION: ICD-10-CM

## 2019-11-26 LAB — PSA SERPL-MCNC: 1.3 NG/ML (ref 0–4)

## 2019-11-26 PROCEDURE — 84153 ASSAY OF PSA TOTAL: CPT

## 2019-12-30 ENCOUNTER — OFFICE VISIT (OUTPATIENT)
Dept: FAMILY MEDICINE CLINIC | Facility: CLINIC | Age: 59
End: 2019-12-30
Payer: COMMERCIAL

## 2019-12-30 ENCOUNTER — APPOINTMENT (OUTPATIENT)
Dept: RADIOLOGY | Facility: CLINIC | Age: 59
End: 2019-12-30
Payer: COMMERCIAL

## 2019-12-30 VITALS
RESPIRATION RATE: 16 BRPM | DIASTOLIC BLOOD PRESSURE: 88 MMHG | WEIGHT: 194 LBS | TEMPERATURE: 97 F | HEART RATE: 66 BPM | SYSTOLIC BLOOD PRESSURE: 124 MMHG | HEIGHT: 68 IN | BODY MASS INDEX: 29.4 KG/M2

## 2019-12-30 DIAGNOSIS — K21.9 GASTROESOPHAGEAL REFLUX DISEASE WITHOUT ESOPHAGITIS: ICD-10-CM

## 2019-12-30 DIAGNOSIS — M19.042 PRIMARY OSTEOARTHRITIS OF BOTH HANDS: ICD-10-CM

## 2019-12-30 DIAGNOSIS — M19.041 PRIMARY OSTEOARTHRITIS OF BOTH HANDS: ICD-10-CM

## 2019-12-30 DIAGNOSIS — Z23 NEED FOR VACCINATION: ICD-10-CM

## 2019-12-30 DIAGNOSIS — I10 ESSENTIAL HYPERTENSION: Primary | ICD-10-CM

## 2019-12-30 DIAGNOSIS — Z11.4 SCREENING FOR HIV (HUMAN IMMUNODEFICIENCY VIRUS): ICD-10-CM

## 2019-12-30 PROBLEM — C44.92 SQUAMOUS CELL SKIN CANCER: Status: ACTIVE | Noted: 2019-12-30

## 2019-12-30 PROBLEM — R68.2 DRY MOUTH: Status: RESOLVED | Noted: 2018-08-21 | Resolved: 2019-12-30

## 2019-12-30 PROBLEM — J32.0 MAXILLARY SINUSITIS: Status: RESOLVED | Noted: 2019-06-10 | Resolved: 2019-12-30

## 2019-12-30 PROCEDURE — 90472 IMMUNIZATION ADMIN EACH ADD: CPT | Performed by: FAMILY MEDICINE

## 2019-12-30 PROCEDURE — 73130 X-RAY EXAM OF HAND: CPT

## 2019-12-30 PROCEDURE — 3008F BODY MASS INDEX DOCD: CPT | Performed by: FAMILY MEDICINE

## 2019-12-30 PROCEDURE — 90471 IMMUNIZATION ADMIN: CPT | Performed by: FAMILY MEDICINE

## 2019-12-30 PROCEDURE — 90715 TDAP VACCINE 7 YRS/> IM: CPT | Performed by: FAMILY MEDICINE

## 2019-12-30 PROCEDURE — 90682 RIV4 VACC RECOMBINANT DNA IM: CPT | Performed by: FAMILY MEDICINE

## 2019-12-30 PROCEDURE — 3074F SYST BP LT 130 MM HG: CPT | Performed by: FAMILY MEDICINE

## 2019-12-30 PROCEDURE — 3079F DIAST BP 80-89 MM HG: CPT | Performed by: FAMILY MEDICINE

## 2019-12-30 PROCEDURE — 99214 OFFICE O/P EST MOD 30 MIN: CPT | Performed by: FAMILY MEDICINE

## 2019-12-30 RX ORDER — AMLODIPINE BESYLATE 5 MG/1
5 TABLET ORAL DAILY
Qty: 90 TABLET | Refills: 1 | Status: SHIPPED | OUTPATIENT
Start: 2019-12-30 | End: 2020-04-01 | Stop reason: SDUPTHER

## 2019-12-30 NOTE — PROGRESS NOTES
Assessment/Plan:  Essential hypertension  The patient is stable on his current medication  We refilled his medication as ordered  He will go for the lab work as ordered and we will follow up with the results  We will see him back in the office as scheduled  Esophageal reflux  The patient will continue on his current dose of pantoprazole  He will continue to work on improving his diet and exercise  We will see him back as scheduled  Primary osteoarthritis of both hands  The patient will go for the x-rays of both hands as ordered  He will follow up with Orthopedics as recommended  Patient will check with his insurance about coverage for the shingles vaccine  He will report back to us if interested  Diagnoses and all orders for this visit:    Essential hypertension  -     CBC and differential; Future  -     Comprehensive metabolic panel; Future  -     LDL cholesterol, direct; Future  -     Lipid panel; Future  -     TSH, 3rd generation with Free T4 reflex; Future  -     UA (URINE) with reflex to Scope; Future  -     amLODIPine (NORVASC) 5 mg tablet; Take 1 tablet (5 mg total) by mouth daily    Gastroesophageal reflux disease without esophagitis  -     CBC and differential; Future  -     Comprehensive metabolic panel; Future  -     LDL cholesterol, direct; Future  -     Lipid panel; Future  -     TSH, 3rd generation with Free T4 reflex; Future  -     UA (URINE) with reflex to Scope; Future    Primary osteoarthritis of both hands  -     Ambulatory referral to Orthopedic Surgery; Future  -     XR hand 3+ vw left; Future  -     XR hand 3+ vw right; Future    Need for vaccination  -     influenza vaccine, [id number], quadrivalent, recombinant, PF, 0 5 mL, for patients 18 yr+ (FLUBLOK)    Screening for HIV (human immunodeficiency virus)  -     HIV 1/2 AG-AB combo; Future  -     TDAP VACCINE GREATER THAN OR EQUAL TO 8YO IM       Return in about 6 months (around 6/30/2020) for Recheck       Subjective: Chief Complaint   Patient presents with    Follow-up     medication check        Patient ID: Dario Andrade is a 64 y o  male presents today for routine checkup  Dario Andrade is a 64 y o  male who presents today for follow-up of his hypertension and GERD  He is doing well  He sees the Urologist regularly for his BPH and the PSA was normal   The patient denies any chest pain, shortness of breath, or palpitations  There is no edema  There are no headaches or visual changes  There is no lightheadedness, dizziness, or fainting spells  The patient currently denies any nausea, vomiting, or GERD symptoms  he has normal bowel movements and normal urine output  he has a normal appetite  He was treated for squamous cell cancer of the scalp  H has gained weight  Hypertension   This is a chronic problem  The current episode started more than 1 year ago  The problem is unchanged  The problem is controlled  Pertinent negatives include no anxiety, blurred vision, chest pain, headaches, malaise/fatigue, neck pain, orthopnea, palpitations, peripheral edema, PND, shortness of breath or sweats  Past treatments include calcium channel blockers  The current treatment provides significant improvement       The following portions of the patient's history were reviewed and updated as appropriate: allergies, current medications, past family history, past medical history, past social history, past surgical history and problem list   Patient Active Problem List   Diagnosis    Essential hypertension    ZIA (obstructive sleep apnea)    Insufficient sleep syndrome    Hypersomnia    Palpitations    Allergic rhinitis    Chronic bilateral low back pain with bilateral sciatica    Chronic myofascial pain    Esophageal reflux    Hemangioma of skin    Displacement of lumbar intervertebral disc without myelopathy    Lumbar spondylolysis    Multiple pigmented nevi    Primary localized osteoarthritis of right hip    Primary localized osteoarthritis of right knee    Rectus diastasis    Chronic pansinusitis    Arthritis of right hip    Lumbar radiculopathy    Squamous cell skin cancer- scalp    Primary osteoarthritis of both hands     Past Medical History:   Diagnosis Date    Arthritis     Fingers, lower back ,right hip    Chronic pain     CPAP (continuous positive airway pressure) dependence     GERD (gastroesophageal reflux disease)     Hemangioma of skin     Hypertension     Irritable bowel syndrome     Lumbar spondylolysis     Multiple pigmented nevi     Obstructive sleep apnea syndrome     PONV (postoperative nausea and vomiting)     Premature atrial beats     Premature ventricular beats     Rectus diastasis     Sleep apnea     Umbilical hernia      Past Surgical History:   Procedure Laterality Date    HERNIA REPAIR      KNEE ARTHROSCOPY Right     FL REPAIR UMBILICAL ICSL,6+F/I,EMUBP N/A 12/12/2017    Procedure: OPEN UMBILICAL HERNIA REPAIR WITH MESH;  Surgeon: Fany Olvera MD;  Location:  MAIN OR;  Service: General    WISDOM TOOTH EXTRACTION Left      Allergies   Allergen Reactions    Lisinopril Swelling    Ibuprofen Rash    Penicillins      Other reaction(s): Rash     Family History   Problem Relation Age of Onset    Arthritis Mother     Heart disease Father     Hypertension Father     No Known Problems Sister     No Known Problems Brother      Social History     Socioeconomic History    Marital status: /Civil Union     Spouse name: Not on file    Number of children: Not on file    Years of education: Not on file    Highest education level: Not on file   Occupational History    Not on file   Social Needs    Financial resource strain: Not hard at all   [place] insecurity:     Worry: Never true     Inability: Never true    Transportation needs:     Medical: No     Non-medical: No   Tobacco Use    Smoking status: Never Smoker    Smokeless tobacco: Never Used Substance and Sexual Activity    Alcohol use: Yes     Alcohol/week: 4 0 standard drinks     Types: 4 Cans of beer per week     Frequency: Monthly or less     Drinks per session: 1 or 2    Drug use: No    Sexual activity: Yes     Partners: Female     Birth control/protection: None   Lifestyle    Physical activity:     Days per week: 3 days     Minutes per session: 60 min    Stress: Not at all   Relationships    Social connections:     Talks on phone: More than three times a week     Gets together: Once a week     Attends Oriental orthodox service: More than 4 times per year     Active member of club or organization: Yes     Attends meetings of clubs or organizations: More than 4 times per year     Relationship status:     Intimate partner violence:     Fear of current or ex partner: No     Emotionally abused: No     Physically abused: No     Forced sexual activity: No   Other Topics Concern    Not on file   Social History Narrative    Not on file     Current Outpatient Medications on File Prior to Visit   Medication Sig Dispense Refill    Loratadine (CLARITIN) 10 MG CAPS Take 1 tablet by mouth daily      meloxicam (MOBIC) 15 mg tablet Take 1 tablet (15 mg total) by mouth daily 30 tablet 0    mometasone (NASONEX) 50 mcg/act nasal spray 2 sprays into each nostril daily      mupirocin (BACTROBAN) 2 % ointment APPLY 1 TO 2 TIMES A DAY TO BIOPSY SITES UNTIL HEALED  1    pantoprazole (PROTONIX) 40 mg tablet Take 40 mg by mouth daily       No current facility-administered medications on file prior to visit  Review of Systems   Constitutional: Negative  Negative for malaise/fatigue  HENT: Negative  Eyes: Negative  Negative for blurred vision  Respiratory: Negative  Negative for shortness of breath  Cardiovascular: Negative  Negative for chest pain, palpitations, orthopnea and PND  Gastrointestinal: Negative  Endocrine: Negative  Genitourinary: Negative  Musculoskeletal: Negative  Negative for neck pain  Skin: Negative  Allergic/Immunologic: Negative  Neurological: Negative  Negative for headaches  Hematological: Negative  Psychiatric/Behavioral: Negative  Objective:  Vitals:    12/30/19 0843   BP: 124/88   BP Location: Left arm   Patient Position: Sitting   Cuff Size: Standard   Pulse: 66   Resp: 16   Temp: (!) 97 °F (36 1 °C)   TempSrc: Tympanic   Weight: 88 kg (194 lb)   Height: 5' 8" (1 727 m)     Body mass index is 29 5 kg/m²  Physical Exam   Constitutional: He is oriented to person, place, and time  He appears well-developed and well-nourished  No distress  Eyes: Pupils are equal, round, and reactive to light  EOM are normal    Neck: Normal range of motion  Neck supple  No JVD present  No tracheal deviation present  No thyromegaly present  Cardiovascular: Normal rate, regular rhythm and normal heart sounds  Exam reveals no gallop and no friction rub  No murmur heard  Pulmonary/Chest: Effort normal and breath sounds normal  No stridor  No respiratory distress  He has no wheezes  He has no rales  He exhibits no tenderness  Abdominal: Soft  Bowel sounds are normal  He exhibits no distension and no mass  There is no tenderness  There is no rebound and no guarding  Musculoskeletal: Normal range of motion  Lymphadenopathy:     He has no cervical adenopathy  Neurological: He is alert and oriented to person, place, and time  He has normal reflexes  He displays normal reflexes  No cranial nerve deficit  He exhibits normal muscle tone  Coordination normal    Skin: Skin is warm and dry  No rash noted  He is not diaphoretic  No erythema  No pallor  Nursing note and vitals reviewed  BMI Counseling: Body mass index is 29 5 kg/m²   The BMI is above normal  Nutrition recommendations include decreasing portion sizes, encouraging healthy choices of fruits and vegetables, decreasing fast food intake, consuming healthier snacks, limiting drinks that contain sugar, moderation in carbohydrate intake, increasing intake of lean protein, reducing intake of saturated and trans fat and reducing intake of cholesterol  Exercise recommendations include exercising 3-5 times per week and strength training exercises  No pharmacotherapy was ordered  Patient referred to PCP due to patient being overweight

## 2019-12-30 NOTE — PATIENT INSTRUCTIONS
Check with your insurance about the [facility] [address] shingles vaccine  Please contact our office if you are interested in receiving the vaccination

## 2019-12-31 PROBLEM — M19.042 PRIMARY OSTEOARTHRITIS OF BOTH HANDS: Status: ACTIVE | Noted: 2019-12-31

## 2019-12-31 PROBLEM — M19.041 PRIMARY OSTEOARTHRITIS OF BOTH HANDS: Status: ACTIVE | Noted: 2019-12-31

## 2019-12-31 NOTE — ASSESSMENT & PLAN NOTE
The patient will continue on his current dose of pantoprazole  He will continue to work on improving his diet and exercise  We will see him back as scheduled

## 2019-12-31 NOTE — ASSESSMENT & PLAN NOTE
The patient is stable on his current medication  We refilled his medication as ordered  He will go for the lab work as ordered and we will follow up with the results  We will see him back in the office as scheduled

## 2019-12-31 NOTE — ASSESSMENT & PLAN NOTE
The patient will go for the x-rays of both hands as ordered  He will follow up with Orthopedics as recommended

## 2020-01-15 ENCOUNTER — TELEPHONE (OUTPATIENT)
Dept: FAMILY MEDICINE CLINIC | Facility: CLINIC | Age: 60
End: 2020-01-15

## 2020-01-15 DIAGNOSIS — M25.50 POLYARTHRALGIA: ICD-10-CM

## 2020-01-15 DIAGNOSIS — M19.041 ARTHRITIS OF BOTH HANDS: ICD-10-CM

## 2020-01-15 DIAGNOSIS — M15.4 EROSIVE (OSTEO)ARTHRITIS: Primary | ICD-10-CM

## 2020-01-15 DIAGNOSIS — M19.042 ARTHRITIS OF BOTH HANDS: ICD-10-CM

## 2020-01-16 NOTE — TELEPHONE ENCOUNTER
I spoke with the patient on 1/15/2020 and reviewed the results of his x-rays it demonstrates bilateral osteoarthritis in both hands  There are risks of changes present  I am concerned about the possibility rheumatoid arthritis  The patient has a strong family history  His mother has rheumatoid arthritis  I advised him that I am going to send him for some inflammatory markers and will follow up with the results when available  He will get this lab work done as soon as possible  We will have further information at that time

## 2020-01-17 ENCOUNTER — APPOINTMENT (OUTPATIENT)
Dept: LAB | Facility: CLINIC | Age: 60
End: 2020-01-17
Payer: COMMERCIAL

## 2020-01-17 DIAGNOSIS — Z11.4 SCREENING FOR HIV (HUMAN IMMUNODEFICIENCY VIRUS): ICD-10-CM

## 2020-01-17 DIAGNOSIS — M15.4 EROSIVE (OSTEO)ARTHRITIS: ICD-10-CM

## 2020-01-17 DIAGNOSIS — M25.50 POLYARTHRALGIA: ICD-10-CM

## 2020-01-17 DIAGNOSIS — M19.041 ARTHRITIS OF BOTH HANDS: ICD-10-CM

## 2020-01-17 DIAGNOSIS — I10 ESSENTIAL HYPERTENSION: ICD-10-CM

## 2020-01-17 DIAGNOSIS — K21.9 GASTROESOPHAGEAL REFLUX DISEASE WITHOUT ESOPHAGITIS: ICD-10-CM

## 2020-01-17 DIAGNOSIS — M19.042 ARTHRITIS OF BOTH HANDS: ICD-10-CM

## 2020-01-17 LAB
ALBUMIN SERPL BCP-MCNC: 4.1 G/DL (ref 3.5–5)
ALP SERPL-CCNC: 73 U/L (ref 46–116)
ALT SERPL W P-5'-P-CCNC: 43 U/L (ref 12–78)
ANION GAP SERPL CALCULATED.3IONS-SCNC: 6 MMOL/L (ref 4–13)
AST SERPL W P-5'-P-CCNC: 32 U/L (ref 5–45)
BASOPHILS # BLD AUTO: 0.04 THOUSANDS/ΜL (ref 0–0.1)
BASOPHILS NFR BLD AUTO: 1 % (ref 0–1)
BILIRUB SERPL-MCNC: 0.86 MG/DL (ref 0.2–1)
BILIRUB UR QL STRIP: NEGATIVE
BUN SERPL-MCNC: 14 MG/DL (ref 5–25)
CALCIUM SERPL-MCNC: 9.1 MG/DL (ref 8.3–10.1)
CHLORIDE SERPL-SCNC: 110 MMOL/L (ref 100–108)
CHOLEST SERPL-MCNC: 129 MG/DL (ref 50–200)
CLARITY UR: CLEAR
CO2 SERPL-SCNC: 26 MMOL/L (ref 21–32)
COLOR UR: YELLOW
CREAT SERPL-MCNC: 1.13 MG/DL (ref 0.6–1.3)
CRP SERPL QL: <3 MG/L
EOSINOPHIL # BLD AUTO: 0.14 THOUSAND/ΜL (ref 0–0.61)
EOSINOPHIL NFR BLD AUTO: 3 % (ref 0–6)
ERYTHROCYTE [DISTWIDTH] IN BLOOD BY AUTOMATED COUNT: 13 % (ref 11.6–15.1)
ERYTHROCYTE [SEDIMENTATION RATE] IN BLOOD: 3 MM/HOUR (ref 0–10)
GFR SERPL CREATININE-BSD FRML MDRD: 72 ML/MIN/1.73SQ M
GLUCOSE P FAST SERPL-MCNC: 88 MG/DL (ref 65–99)
GLUCOSE UR STRIP-MCNC: NEGATIVE MG/DL
HCT VFR BLD AUTO: 44.5 % (ref 36.5–49.3)
HDLC SERPL-MCNC: 40 MG/DL
HGB BLD-MCNC: 14.2 G/DL (ref 12–17)
HGB UR QL STRIP.AUTO: NEGATIVE
IMM GRANULOCYTES # BLD AUTO: 0.01 THOUSAND/UL (ref 0–0.2)
IMM GRANULOCYTES NFR BLD AUTO: 0 % (ref 0–2)
KETONES UR STRIP-MCNC: NEGATIVE MG/DL
LDLC SERPL CALC-MCNC: 65 MG/DL (ref 0–100)
LDLC SERPL DIRECT ASSAY-MCNC: 72 MG/DL (ref 0–100)
LEUKOCYTE ESTERASE UR QL STRIP: NEGATIVE
LYMPHOCYTES # BLD AUTO: 1.47 THOUSANDS/ΜL (ref 0.6–4.47)
LYMPHOCYTES NFR BLD AUTO: 26 % (ref 14–44)
MCH RBC QN AUTO: 29 PG (ref 26.8–34.3)
MCHC RBC AUTO-ENTMCNC: 31.9 G/DL (ref 31.4–37.4)
MCV RBC AUTO: 91 FL (ref 82–98)
MONOCYTES # BLD AUTO: 0.55 THOUSAND/ΜL (ref 0.17–1.22)
MONOCYTES NFR BLD AUTO: 10 % (ref 4–12)
NEUTROPHILS # BLD AUTO: 3.36 THOUSANDS/ΜL (ref 1.85–7.62)
NEUTS SEG NFR BLD AUTO: 60 % (ref 43–75)
NITRITE UR QL STRIP: NEGATIVE
NONHDLC SERPL-MCNC: 89 MG/DL
NRBC BLD AUTO-RTO: 0 /100 WBCS
PH UR STRIP.AUTO: 6.5 [PH]
PLATELET # BLD AUTO: 187 THOUSANDS/UL (ref 149–390)
PMV BLD AUTO: 11.2 FL (ref 8.9–12.7)
POTASSIUM SERPL-SCNC: 4 MMOL/L (ref 3.5–5.3)
PROT SERPL-MCNC: 7.2 G/DL (ref 6.4–8.2)
PROT UR STRIP-MCNC: NEGATIVE MG/DL
RBC # BLD AUTO: 4.89 MILLION/UL (ref 3.88–5.62)
SODIUM SERPL-SCNC: 142 MMOL/L (ref 136–145)
SP GR UR STRIP.AUTO: 1.02 (ref 1–1.03)
TRIGL SERPL-MCNC: 121 MG/DL
TSH SERPL DL<=0.05 MIU/L-ACNC: 1.89 UIU/ML (ref 0.36–3.74)
UROBILINOGEN UR QL STRIP.AUTO: 0.2 E.U./DL
WBC # BLD AUTO: 5.57 THOUSAND/UL (ref 4.31–10.16)

## 2020-01-17 PROCEDURE — 81003 URINALYSIS AUTO W/O SCOPE: CPT

## 2020-01-17 PROCEDURE — 85652 RBC SED RATE AUTOMATED: CPT

## 2020-01-17 PROCEDURE — 86038 ANTINUCLEAR ANTIBODIES: CPT

## 2020-01-17 PROCEDURE — 36415 COLL VENOUS BLD VENIPUNCTURE: CPT

## 2020-01-17 PROCEDURE — 80053 COMPREHEN METABOLIC PANEL: CPT

## 2020-01-17 PROCEDURE — 86200 CCP ANTIBODY: CPT

## 2020-01-17 PROCEDURE — 85025 COMPLETE CBC W/AUTO DIFF WBC: CPT

## 2020-01-17 PROCEDURE — 86140 C-REACTIVE PROTEIN: CPT

## 2020-01-17 PROCEDURE — 84443 ASSAY THYROID STIM HORMONE: CPT

## 2020-01-17 PROCEDURE — 87389 HIV-1 AG W/HIV-1&-2 AB AG IA: CPT

## 2020-01-17 PROCEDURE — 80061 LIPID PANEL: CPT

## 2020-01-17 PROCEDURE — 86430 RHEUMATOID FACTOR TEST QUAL: CPT

## 2020-01-17 PROCEDURE — 83721 ASSAY OF BLOOD LIPOPROTEIN: CPT

## 2020-01-19 LAB
CCP IGA+IGG SERPL IA-ACNC: 11 UNITS (ref 0–19)
HIV 1+2 AB+HIV1 P24 AG SERPL QL IA: NORMAL

## 2020-01-20 LAB
RHEUMATOID FACT SER QL LA: NEGATIVE
RYE IGE QN: NEGATIVE

## 2020-02-03 ENCOUNTER — CONSULT (OUTPATIENT)
Dept: OBGYN CLINIC | Facility: CLINIC | Age: 60
End: 2020-02-03
Payer: COMMERCIAL

## 2020-02-03 VITALS
DIASTOLIC BLOOD PRESSURE: 84 MMHG | HEIGHT: 68 IN | SYSTOLIC BLOOD PRESSURE: 124 MMHG | BODY MASS INDEX: 29.73 KG/M2 | WEIGHT: 196.2 LBS

## 2020-02-03 DIAGNOSIS — M19.041 PRIMARY OSTEOARTHRITIS OF BOTH HANDS: ICD-10-CM

## 2020-02-03 DIAGNOSIS — M19.042 PRIMARY OSTEOARTHRITIS OF BOTH HANDS: ICD-10-CM

## 2020-02-03 PROCEDURE — 3074F SYST BP LT 130 MM HG: CPT | Performed by: ORTHOPAEDIC SURGERY

## 2020-02-03 PROCEDURE — 3008F BODY MASS INDEX DOCD: CPT | Performed by: ORTHOPAEDIC SURGERY

## 2020-02-03 PROCEDURE — 1036F TOBACCO NON-USER: CPT | Performed by: ORTHOPAEDIC SURGERY

## 2020-02-03 PROCEDURE — 3079F DIAST BP 80-89 MM HG: CPT | Performed by: ORTHOPAEDIC SURGERY

## 2020-02-03 PROCEDURE — 99213 OFFICE O/P EST LOW 20 MIN: CPT | Performed by: ORTHOPAEDIC SURGERY

## 2020-02-03 RX ORDER — MELOXICAM 15 MG/1
15 TABLET ORAL DAILY
Qty: 30 TABLET | Refills: 1 | Status: SHIPPED | OUTPATIENT
Start: 2020-02-03 | End: 2021-02-08 | Stop reason: SDUPTHER

## 2020-02-03 NOTE — PROGRESS NOTES
ASSESSMENT/PLAN:    Assessment:   Primary osteoarthritis both hands DIP joints, PIP with herberden's nodules, anthony nodes, more symptomatic left hand index and long finger    Plan:   Discussed CSI with patient but would offer short term relief, he wishes to finish out golf season, see back in September and possible schedule surgery for left hand index and long finger, possible replacement  Follow Up:  September       General Discussions:     Osteoarthritis:  The anatomy and physiology of osteoarthritis was discussed with the patient today in the office  Deterioration of the articular cartilage eventually leads to altered mobility at the joint, resulting in joint subluxation, osteophyte formation, cystic changes, as well as subchondral sclerosis  Eventually, pain, limited mobility, and compensatory hypermobility at surrounding joints may develop  While normal activity and usage of the joint may provide a painful experience to the patient, this typically does not result in damage to the limb  Treatment options include splints to decreased joint edema, pain, and inflammation  Therapy exercises to strengthen the surrounding musculature may relieve pain, but do not alter the overall continued development of osteoarthritis  Oral medications, topical medications, corticosteroid injections may decrease pain and increase overall function  Eventually, some patients may require surgical intervention  _____________________________________________________  CHIEF COMPLAINT:  Chief Complaint   Patient presents with    Left Hand - Pain    Right Hand - Pain         SUBJECTIVE:  Ruth Hanson is a 64 y o  male who presents for evaluation of his left hand, RHD  referred by PCP  Past few months he has been unable to close his fingers to make a fist, difficulty grasping golf club, needs thicker   He has significant degenerative changes DIP joints of all fingers, worst index and long finger   He did have rheumatoid lab work by PCP negative for RF  His mother does have rheumatoid  He was using mobic as needed for pain which does help  Denies numbness or tingling in hand, no catching, locking  Right hand does bother him but doesn't functionally impair as left    Radiation: None  Previous Treatments: mobic   Associated symptoms: none    PAST MEDICAL HISTORY:  Past Medical History:   Diagnosis Date    Arthritis     Fingers, lower back ,right hip    Chronic pain     CPAP (continuous positive airway pressure) dependence     GERD (gastroesophageal reflux disease)     Hemangioma of skin     Hypertension     Irritable bowel syndrome     Lumbar spondylolysis     Multiple pigmented nevi     Obstructive sleep apnea syndrome     PONV (postoperative nausea and vomiting)     Premature atrial beats     Premature ventricular beats     Rectus diastasis     Sleep apnea     Umbilical hernia        PAST SURGICAL HISTORY:  Past Surgical History:   Procedure Laterality Date    HERNIA REPAIR      KNEE ARTHROSCOPY Right     [place] REPAIR UMBILICAL GRRT,8+E/E,NKTQU N/A 12/12/2017    Procedure: OPEN UMBILICAL HERNIA REPAIR WITH MESH;  Surgeon: Marie Grady MD;  Location: [facility];  Service: General    WISDOM TOOTH EXTRACTION Left        FAMILY HISTORY:  Family History   Problem Relation Age of Onset    Rheum arthritis Mother     Osteoarthritis Mother     Heart disease Father     Hypertension Father     No Known Problems Sister     No Known Problems Brother        SOCIAL HISTORY:  Social History     Tobacco Use    Smoking status: Never Smoker    Smokeless tobacco: Never Used   Substance Use Topics    Alcohol use:  Yes     Alcohol/week: 4 0 standard drinks     Types: 4 Cans of beer per week     Frequency: Monthly or less     Drinks per session: 1 or 2    Drug use: No       MEDICATIONS:    Current Outpatient Medications:     amLODIPine (NORVASC) 5 mg tablet, Take 1 tablet (5 mg total) by mouth daily, Disp: 90 tablet, Rfl: 1    Loratadine (CLARITIN) 10 MG CAPS, Take 1 tablet by mouth daily, Disp: , Rfl:     meloxicam (MOBIC) 15 mg tablet, Take 1 tablet (15 mg total) by mouth daily, Disp: 30 tablet, Rfl: 0    mometasone (NASONEX) 50 mcg/act nasal spray, 2 sprays into each nostril daily, Disp: , Rfl:     pantoprazole (PROTONIX) 40 mg tablet, Take 40 mg by mouth daily, Disp: , Rfl:     mupirocin (BACTROBAN) 2 % ointment, APPLY 1 TO 2 TIMES A DAY TO BIOPSY SITES UNTIL HEALED, Disp: , Rfl: 1    ALLERGIES:  Allergies   Allergen Reactions    Lisinopril Swelling    Ibuprofen Rash    Penicillins      Other reaction(s): Rash       REVIEW OF SYSTEMS:  Pertinent items are noted in HPI  A comprehensive review of systems was negative      LABS:  HgA1c:   Lab Results   Component Value Date    HGBA1C 5 5 12/01/2017     BMP:   Lab Results   Component Value Date    GLUCOSE 74 09/21/2015    CALCIUM 9 1 01/17/2020     09/21/2015    K 4 0 01/17/2020    CO2 26 01/17/2020     (H) 01/17/2020    BUN 14 01/17/2020    CREATININE 1 13 01/17/2020         _____________________________________________________  PHYSICAL EXAMINATION:  Vital signs: /84   Ht 5' 8" (1 727 m)   Wt 89 kg (196 lb 3 2 oz)   BMI 29 83 kg/m²   General: well developed and well nourished, alert, oriented times 3 and appears comfortable  Psychiatric: Normal  HEENT: Trachea Midline, No torticollis  Cardiovascular: No discernable arrhythmia  Pulmonary: No wheezing or stridor  Skin: No masses, erythema, lacerations, fluctation, ulcerations  Neurovascular: Sensation Intact to the Median, Ulnar, Radial Nerve, Motor Intact to the Median, Ulnar, Radial Nerve and Pulses Intact    MUSCULOSKELETAL EXAMINATION:  Left hand:  Non tender to palpation at PIP and DIP joints  herberden's nodules DIP joints 2nd-5th digits  [place] nodule small, ring, index  [place] neck deformity long and ring      Right hand:  Non tender to palpation at DIP and PIP joints of all fingers   [place] neck ring , long   [place] deformity nodule small and index finger   herberden's nodes 2nd-5th digits         _____________________________________________________  STUDIES REVIEWED:  Left hand DIP joint osteoarthritis with erosive changes at second, third, fifth , degenerative changes first, fourth DIP with degenerative changes 2nd-5th PIP joints     Right hand DIP degenerative changes at third-fifth joints        PROCEDURES PERFORMED:  Procedures  No Procedures performed today      Scribe Attestation    I,:    am acting as a scribe while in the presence of the attending physician :        I,:    personally performed the services described in this documentation    as scribed in my presence :          Scribe Attestation    I,:   Leeroy Luciano am acting as a scribe while in the presence of the attending physician :        I,:   Reba Rubi MD personally performed the services described in this documentation    as scribed in my presence :

## 2020-04-01 DIAGNOSIS — I10 ESSENTIAL HYPERTENSION: ICD-10-CM

## 2020-04-01 RX ORDER — AMLODIPINE BESYLATE 5 MG/1
5 TABLET ORAL DAILY
Qty: 90 TABLET | Refills: 1 | Status: SHIPPED | OUTPATIENT
Start: 2020-04-01 | End: 2020-11-19

## 2020-05-18 ENCOUNTER — OFFICE VISIT (OUTPATIENT)
Dept: SURGERY | Facility: HOSPITAL | Age: 60
End: 2020-05-18
Payer: COMMERCIAL

## 2020-05-18 VITALS
DIASTOLIC BLOOD PRESSURE: 93 MMHG | TEMPERATURE: 99 F | RESPIRATION RATE: 16 BRPM | WEIGHT: 194.8 LBS | SYSTOLIC BLOOD PRESSURE: 141 MMHG | BODY MASS INDEX: 29.52 KG/M2 | HEIGHT: 68 IN | HEART RATE: 56 BPM

## 2020-05-18 DIAGNOSIS — R10.33 PERIUMBILICAL ABDOMINAL PAIN: ICD-10-CM

## 2020-05-18 DIAGNOSIS — K42.9 RECURRENT UMBILICAL HERNIA: Primary | ICD-10-CM

## 2020-05-18 DIAGNOSIS — K40.91 RECURRENT INGUINAL HERNIA: ICD-10-CM

## 2020-05-18 DIAGNOSIS — R10.30 LOWER ABDOMINAL PAIN: ICD-10-CM

## 2020-05-18 PROCEDURE — 99213 OFFICE O/P EST LOW 20 MIN: CPT | Performed by: SURGERY

## 2020-05-26 ENCOUNTER — APPOINTMENT (OUTPATIENT)
Dept: LAB | Facility: HOSPITAL | Age: 60
End: 2020-05-26
Attending: SURGERY
Payer: COMMERCIAL

## 2020-05-26 DIAGNOSIS — K42.9 RECURRENT UMBILICAL HERNIA: ICD-10-CM

## 2020-05-26 LAB
BUN SERPL-MCNC: 17 MG/DL (ref 5–25)
CREAT SERPL-MCNC: 0.99 MG/DL (ref 0.6–1.3)
GFR SERPL CREATININE-BSD FRML MDRD: 84 ML/MIN/1.73SQ M

## 2020-05-26 PROCEDURE — 82565 ASSAY OF CREATININE: CPT

## 2020-05-26 PROCEDURE — 36415 COLL VENOUS BLD VENIPUNCTURE: CPT

## 2020-05-26 PROCEDURE — 84520 ASSAY OF UREA NITROGEN: CPT

## 2020-05-29 ENCOUNTER — HOSPITAL ENCOUNTER (OUTPATIENT)
Dept: CT IMAGING | Facility: HOSPITAL | Age: 60
Discharge: HOME | End: 2020-05-29
Attending: SURGERY
Payer: COMMERCIAL

## 2020-05-29 DIAGNOSIS — K40.91 RECURRENT INGUINAL HERNIA: ICD-10-CM

## 2020-05-29 DIAGNOSIS — K42.9 RECURRENT UMBILICAL HERNIA: ICD-10-CM

## 2020-05-29 PROCEDURE — 74177 CT ABD & PELVIS W/CONTRAST: CPT

## 2020-05-29 RX ADMIN — IOHEXOL 100 ML: 350 INJECTION, SOLUTION INTRAVENOUS at 14:10

## 2020-06-02 ENCOUNTER — TELEPHONE (OUTPATIENT)
Dept: SURGERY | Facility: CLINIC | Age: 60
End: 2020-06-02

## 2020-07-06 ENCOUNTER — OFFICE VISIT (OUTPATIENT)
Dept: FAMILY MEDICINE CLINIC | Facility: CLINIC | Age: 60
End: 2020-07-06
Payer: COMMERCIAL

## 2020-07-06 VITALS
SYSTOLIC BLOOD PRESSURE: 140 MMHG | OXYGEN SATURATION: 97 % | BODY MASS INDEX: 29.67 KG/M2 | TEMPERATURE: 98.1 F | WEIGHT: 195.8 LBS | HEIGHT: 68 IN | HEART RATE: 70 BPM | DIASTOLIC BLOOD PRESSURE: 92 MMHG

## 2020-07-06 DIAGNOSIS — J32.4 CHRONIC PANSINUSITIS: ICD-10-CM

## 2020-07-06 DIAGNOSIS — J01.91 ACUTE RECURRENT SINUSITIS, UNSPECIFIED LOCATION: ICD-10-CM

## 2020-07-06 DIAGNOSIS — H61.23 BILATERAL IMPACTED CERUMEN: ICD-10-CM

## 2020-07-06 PROCEDURE — 99213 OFFICE O/P EST LOW 20 MIN: CPT | Performed by: FAMILY MEDICINE

## 2020-07-06 PROCEDURE — 69210 REMOVE IMPACTED EAR WAX UNI: CPT | Performed by: FAMILY MEDICINE

## 2020-07-06 PROCEDURE — 1036F TOBACCO NON-USER: CPT | Performed by: FAMILY MEDICINE

## 2020-07-06 PROCEDURE — 3008F BODY MASS INDEX DOCD: CPT | Performed by: FAMILY MEDICINE

## 2020-07-06 PROCEDURE — 3077F SYST BP >= 140 MM HG: CPT | Performed by: FAMILY MEDICINE

## 2020-07-06 PROCEDURE — 3080F DIAST BP >= 90 MM HG: CPT | Performed by: FAMILY MEDICINE

## 2020-07-06 RX ORDER — PREDNISONE 20 MG/1
TABLET ORAL
Qty: 15 TABLET | Refills: 0 | Status: SHIPPED | OUTPATIENT
Start: 2020-07-06 | End: 2021-04-27 | Stop reason: ALTCHOICE

## 2020-07-06 RX ORDER — SULFAMETHOXAZOLE AND TRIMETHOPRIM 800; 160 MG/1; MG/1
1 TABLET ORAL EVERY 12 HOURS SCHEDULED
Qty: 20 TABLET | Refills: 0 | Status: SHIPPED | OUTPATIENT
Start: 2020-07-06 | End: 2020-07-16

## 2020-07-06 NOTE — PROGRESS NOTES
Assessment/Plan:  1  Acute on chronic pansinusitis-we will treat the patient with the Bactrim and prednisone taper as ordered  He will follow-up with his ENT as scheduled  He will call if the symptoms worsen or if there is no improvement within the next 2-3 days  2  Bilateral impacted cerumen-the cerumen is removed partially from the right ear in the office today by way of an ear lavage  He still has significant impaction bilaterally  I advised him to use the Debrox drops for few days to loosen the wax and we will see him later in the week for a repeat ear lavage  We will see him back as scheduled  Diagnoses and all orders for this visit:    Chronic pansinusitis  -     sulfamethoxazole-trimethoprim (BACTRIM DS) 800-160 mg per tablet; Take 1 tablet by mouth every 12 (twelve) hours for 10 days  -     predniSONE 20 mg tablet; Take 3 tablets for 2 days then 2 tablets for 3 days then 1 tablet for 3 days then stop    Acute recurrent sinusitis, unspecified location  -     sulfamethoxazole-trimethoprim (BACTRIM DS) 800-160 mg per tablet; Take 1 tablet by mouth every 12 (twelve) hours for 10 days  -     predniSONE 20 mg tablet; Take 3 tablets for 2 days then 2 tablets for 3 days then 1 tablet for 3 days then stop    Bilateral impacted cerumen  -     Ear cerumen removal       Return Later this week for ear lavage  , for Recheck  Subjective:   Chief Complaint   Patient presents with    Facial Pain     Right sinus pressure, and right ear pressure        Patient ID: Acosta Baez is a 64 y o  male presents today for evaluation of sinus pressure  Acosta Baez is a 64 y o  Male who presents today for evaluation of right sinus pressure right ear pressure  He has chronic sinus problems  He sees Dr Brittney Quiñones- ENT for his chronic sinusitis and will require surgery at some point  He has had increased symptoms over the past week- there is right sided sinus congestion and pressure    There yellow drainage form the left  The right side is plugged  He is using the CPAP  There are no fevers  There is sinus pressure  He uses Flonase NS  The patient denies any chest pain, shortness of breath, or palpitations  There is no edema  There are no headaches or visual changes  There is no lightheadedness, dizziness, or fainting spells  He denies any fever  There is pain and pressure in his sinuses  He does have some postnasal drainage  There is no shortness of breath  He does have increased fatigue as well  URI    This is a recurrent problem  The current episode started 1 to 4 weeks ago  The problem has been gradually worsening  There has been no fever  Associated symptoms include congestion, coughing, headaches, rhinorrhea, sinus pain and swollen glands  Pertinent negatives include no abdominal pain, chest pain, diarrhea, dysuria, ear pain, joint pain, joint swelling, nausea, neck pain, plugged ear sensation, rash, sneezing, sore throat, vomiting or wheezing       The following portions of the patient's history were reviewed and updated as appropriate: allergies, current medications, past family history, past medical history, past social history, past surgical history and problem list   Patient Active Problem List   Diagnosis    Essential hypertension    ZIA (obstructive sleep apnea)    Insufficient sleep syndrome    Hypersomnia    Palpitations    Allergic rhinitis    Chronic bilateral low back pain with bilateral sciatica    Chronic myofascial pain    Esophageal reflux    Hemangioma of skin    Displacement of lumbar intervertebral disc without myelopathy    Lumbar spondylolysis    Multiple pigmented nevi    Primary localized osteoarthritis of right hip    Primary localized osteoarthritis of right knee    Rectus diastasis    Chronic pansinusitis    Arthritis of right hip    Lumbar radiculopathy    Squamous cell skin cancer- scalp    Primary osteoarthritis of both hands     Past Medical History:   Diagnosis Date    Arthritis     Fingers, lower back ,right hip    Chronic pain     CPAP (continuous positive airway pressure) dependence     GERD (gastroesophageal reflux disease)     Hemangioma of skin     Hypertension     Irritable bowel syndrome     Lumbar spondylolysis     Multiple pigmented nevi     Obstructive sleep apnea syndrome     PONV (postoperative nausea and vomiting)     Premature atrial beats     Premature ventricular beats     Rectus diastasis     Sleep apnea     Umbilical hernia      Past Surgical History:   Procedure Laterality Date    HERNIA REPAIR      KNEE ARTHROSCOPY Right     [place] REPAIR UMBILICAL QMHK,7+V/R,UPYXT N/A 12/12/2017    Procedure: OPEN UMBILICAL HERNIA REPAIR WITH MESH;  Surgeon: Yoni Arthur MD;  Location:  MAIN OR;  Service: General    WISDOM TOOTH EXTRACTION Left      Allergies   Allergen Reactions    Lisinopril Swelling    Ibuprofen Rash    Penicillins      Other reaction(s): Rash     Family History   Problem Relation Age of Onset    Rheum arthritis Mother     Osteoarthritis Mother     Heart disease Father     Hypertension Father     No Known Problems Sister     No Known Problems Brother      Social History     Socioeconomic History    Marital status: /Civil Union     Spouse name: Not on file    Number of children: Not on file    Years of education: Not on file    Highest education level: Not on file   Occupational History    Not on file   Social Needs    Financial resource strain: Not hard at all   [place] insecurity:     Worry: Never true     Inability: Never true    Transportation needs:     Medical: No     Non-medical: No   Tobacco Use    Smoking status: Never Smoker    Smokeless tobacco: Never Used   Substance and Sexual Activity    Alcohol use:  Yes     Alcohol/week: 4 0 standard drinks     Types: 4 Cans of beer per week     Frequency: Monthly or less     Drinks per session: 1 or 2    Drug use: No    Sexual activity: Yes     Partners: Female     Birth control/protection: None   Lifestyle    Physical activity:     Days per week: 3 days     Minutes per session: 60 min    Stress: Not at all   Relationships    Social connections:     Talks on phone: More than three times a week     Gets together: Once a week     Attends [employer] service: More than 4 times per year     Active member of club or organization: Yes     Attends meetings of clubs or organizations: More than 4 times per year     Relationship status:     Intimate partner violence:     Fear of current or ex partner: No     Emotionally abused: No     Physically abused: No     Forced sexual activity: No   Other Topics Concern    Not on file   Social History Narrative    Not on file     Current Outpatient Medications on File Prior to Visit   Medication Sig Dispense Refill    amLODIPine (NORVASC) 5 mg tablet Take 1 tablet (5 mg total) by mouth daily 90 tablet 1    meloxicam (MOBIC) 15 mg tablet Take 1 tablet (15 mg total) by mouth daily 30 tablet 1    mometasone (NASONEX) 50 mcg/act nasal spray 2 sprays into each nostril daily      pantoprazole (PROTONIX) 40 mg tablet Take 40 mg by mouth daily      Loratadine (CLARITIN) 10 MG CAPS Take 1 tablet by mouth daily       No current facility-administered medications on file prior to visit  Review of Systems   Constitutional: Negative  HENT: Positive for congestion, rhinorrhea and sinus pain  Negative for ear pain, sneezing and sore throat  Eyes: Negative  Respiratory: Positive for cough  Negative for wheezing  Cardiovascular: Negative  Negative for chest pain  Gastrointestinal: Negative  Negative for abdominal pain, diarrhea, nausea and vomiting  Endocrine: Negative  Genitourinary: Negative  Negative for dysuria  Musculoskeletal: Negative  Negative for joint pain and neck pain  Skin: Negative  Negative for rash  Allergic/Immunologic: Negative      Neurological: Positive for headaches  Hematological: Negative  Psychiatric/Behavioral: Negative  Objective:  Vitals:    07/06/20 1417   BP: 140/92   BP Location: Right arm   Patient Position: Sitting   Cuff Size: Standard   Pulse: 70   Temp: 98 1 °F (36 7 °C)   TempSrc: Tympanic   SpO2: 97%   Weight: 88 8 kg (195 lb 12 8 oz)   Height: 5' 8" (1 727 m)     Body mass index is 29 77 kg/m²  Physical Exam   Constitutional: He appears well-developed and well-nourished  HENT:   Nose: Mucosal edema, rhinorrhea and sinus tenderness present  Right sinus exhibits maxillary sinus tenderness  Left sinus exhibits maxillary sinus tenderness  The patient has evidence of bilateral cerumen impaction  The TMs are not visualized  Eyes: Pupils are equal, round, and reactive to light  Neck: Normal range of motion  Neck supple  Cardiovascular: Normal rate, regular rhythm and intact distal pulses  Exam reveals no gallop and no friction rub  No murmur heard  Pulmonary/Chest: Effort normal and breath sounds normal  No respiratory distress  He has no wheezes  He has no rales  He exhibits no tenderness  Abdominal: Soft  Bowel sounds are normal  He exhibits no distension and no mass  There is no tenderness  There is no rebound and no guarding  Lymphadenopathy:     He has cervical adenopathy         Ear cerumen removal  Date/Time: 7/6/2020 2:30 PM  Performed by: Dorothy Randhawa DO  Authorized by: Dorothy Randhawa DO     Patient location:  Clinic  Consent:     Consent obtained:  Verbal    Consent given by:  Patient    Risks discussed:  Bleeding, dizziness, infection, incomplete removal, pain and TM perforation    Alternatives discussed:  No treatment and observation  Universal protocol:     Procedure explained and questions answered to patient or proxy's satisfaction: yes      Patient identity confirmed:  Verbally with patient  Procedure details:     Local anesthetic:  None    Location:  R ear    Procedure type: irrigation with instrumentation      Instrumentation: curette and loop      Approach:  External    Visualization (free text): There was a large amount of cerumen in the right ear and the TM was not visualized  Equipment used:  Ear lavage and lighted cerumen loop  Post-procedure details:     Complication:  None    Hearing quality:  Improved    Patient tolerance of procedure: Tolerated well, no immediate complications  Comments: The cerumen was incompletely removed with this procedure  Patient was advised to return home and use Debrox drops for few days and we will recheck his ear later in the week to complete the ear lavage  BMI Counseling: Body mass index is 29 77 kg/m²  The BMI is above normal  Nutrition recommendations include decreasing portion sizes, encouraging healthy choices of fruits and vegetables, decreasing fast food intake, consuming healthier snacks, limiting drinks that contain sugar, moderation in carbohydrate intake, increasing intake of lean protein, reducing intake of saturated and trans fat and reducing intake of cholesterol  Exercise recommendations include exercising 3-5 times per week and strength training exercises  No pharmacotherapy was ordered  Patient referred to PCP due to patient being overweight

## 2020-07-10 ENCOUNTER — OFFICE VISIT (OUTPATIENT)
Dept: FAMILY MEDICINE CLINIC | Facility: CLINIC | Age: 60
End: 2020-07-10
Payer: COMMERCIAL

## 2020-07-10 VITALS
SYSTOLIC BLOOD PRESSURE: 130 MMHG | BODY MASS INDEX: 29.8 KG/M2 | HEART RATE: 68 BPM | DIASTOLIC BLOOD PRESSURE: 92 MMHG | OXYGEN SATURATION: 99 % | HEIGHT: 68 IN | WEIGHT: 196.6 LBS | TEMPERATURE: 97.1 F

## 2020-07-10 DIAGNOSIS — H61.23 BILATERAL IMPACTED CERUMEN: Primary | ICD-10-CM

## 2020-07-10 PROCEDURE — 3008F BODY MASS INDEX DOCD: CPT | Performed by: FAMILY MEDICINE

## 2020-07-10 PROCEDURE — 3075F SYST BP GE 130 - 139MM HG: CPT | Performed by: FAMILY MEDICINE

## 2020-07-10 PROCEDURE — 3080F DIAST BP >= 90 MM HG: CPT | Performed by: FAMILY MEDICINE

## 2020-07-10 PROCEDURE — 69210 REMOVE IMPACTED EAR WAX UNI: CPT | Performed by: FAMILY MEDICINE

## 2020-07-10 PROCEDURE — 99213 OFFICE O/P EST LOW 20 MIN: CPT | Performed by: FAMILY MEDICINE

## 2020-07-10 PROCEDURE — 1036F TOBACCO NON-USER: CPT | Performed by: FAMILY MEDICINE

## 2020-07-10 NOTE — PROGRESS NOTES
Assessment/Plan:  Bilateral cerumen impaction-the ear lavage was performed successfully bilaterally  The cerumen was removed in entirety and both sides  The patient was advised to maintain dry ear precautions and to avoid Q-tip use  We will see him back in the office as scheduled  Diagnoses and all orders for this visit:    Bilateral impacted cerumen  -     Ear cerumen removal       Return in about 1 month (around 8/10/2020) for Recheck  Subjective:   Chief Complaint   Patient presents with    Cerumen Impaction     Ear Lavage        Patient ID: Christina Mcguire is a 64 y o  male presents today for follow-up of his bilateral cerumen impaction and for probable ear lavage  Christina Mcguire is a 64 y o  male who presents today for follow-up of his bilateral cerumen impaction that we detected a few days ago when he was here for his last appointment  He has been using the Debrox drops over-the-counter for few days to help soften the wax  Both ears feel blocked, the right is worse than the left  There is no dizziness or tinnitus  There is no pain  He states that his sinus symptoms have improved with the antibiotic and the prednisone  Ear Fullness    There is pain in both ears  This is a new problem  The current episode started 1 to 4 weeks ago  The problem occurs constantly  The problem has been unchanged  The pain is at a severity of 0/10  The patient is experiencing no pain  Pertinent negatives include no abdominal pain, coughing, diarrhea, ear discharge, headaches, hearing loss, neck pain, rash, rhinorrhea, sore throat or vomiting       The following portions of the patient's history were reviewed and updated as appropriate: allergies, current medications, past family history, past medical history, past social history, past surgical history and problem list   Patient Active Problem List   Diagnosis    Essential hypertension    ZIA (obstructive sleep apnea)    Insufficient sleep syndrome  Hypersomnia    Palpitations    Allergic rhinitis    Chronic bilateral low back pain with bilateral sciatica    Chronic myofascial pain    Esophageal reflux    Hemangioma of skin    Displacement of lumbar intervertebral disc without myelopathy    Lumbar spondylolysis    Multiple pigmented nevi    Primary localized osteoarthritis of right hip    Primary localized osteoarthritis of right knee    Rectus diastasis    Chronic pansinusitis    Arthritis of right hip    Lumbar radiculopathy    Squamous cell skin cancer- scalp    Primary osteoarthritis of both hands     Past Medical History:   Diagnosis Date    Arthritis     Fingers, lower back ,right hip    Chronic pain     CPAP (continuous positive airway pressure) dependence     GERD (gastroesophageal reflux disease)     Hemangioma of skin     Hypertension     Irritable bowel syndrome     Lumbar spondylolysis     Multiple pigmented nevi     Obstructive sleep apnea syndrome     PONV (postoperative nausea and vomiting)     Premature atrial beats     Premature ventricular beats     Rectus diastasis     Sleep apnea     Umbilical hernia      Past Surgical History:   Procedure Laterality Date    HERNIA REPAIR      KNEE ARTHROSCOPY Right     [place] REPAIR UMBILICAL FEAH,3+B/E,LLXKB N/A 12/12/2017    Procedure: OPEN UMBILICAL HERNIA REPAIR WITH MESH;  Surgeon: Dalia White MD;  Location: [facility];  Service: General    WISDOM TOOTH EXTRACTION Left      Allergies   Allergen Reactions    Lisinopril Swelling    Ibuprofen Rash    Penicillins      Other reaction(s): Rash     Family History   Problem Relation Age of Onset    Rheum arthritis Mother     Osteoarthritis Mother     Heart disease Father     Hypertension Father     No Known Problems Sister     No Known Problems Brother      Social History     Socioeconomic History    Marital status: /Civil Union     Spouse name: Not on file    Number of children: Not on file    Years of education: Not on file    Highest education level: Not on file   Occupational History    Not on file   Social Needs    Financial resource strain: Not hard at all    Food insecurity:     Worry: Never true     Inability: Never true   [employer] needs:     Medical: No     Non-medical: No   Tobacco Use    Smoking status: Never Smoker    Smokeless tobacco: Never Used   Substance and Sexual Activity    Alcohol use:  Yes     Alcohol/week: 4 0 standard drinks     Types: 4 Cans of beer per week     Frequency: Monthly or less     Drinks per session: 1 or 2    Drug use: No    Sexual activity: Yes     Partners: Female     Birth control/protection: None   Lifestyle    Physical activity:     Days per week: 3 days     Minutes per session: 60 min    Stress: Not at all   Relationships    Social connections:     Talks on phone: More than three times a week     Gets together: Once a week     Attends Protestant service: More than 4 times per year     Active member of club or organization: Yes     Attends meetings of clubs or organizations: More than 4 times per year     Relationship status:     Intimate partner violence:     Fear of current or ex partner: No     Emotionally abused: No     Physically abused: No     Forced sexual activity: No   Other Topics Concern    Not on file   Social History Narrative    Not on file     Current Outpatient Medications on File Prior to Visit   Medication Sig Dispense Refill    amLODIPine (NORVASC) 5 mg tablet Take 1 tablet (5 mg total) by mouth daily 90 tablet 1    Loratadine (CLARITIN) 10 MG CAPS Take 1 tablet by mouth daily      meloxicam (MOBIC) 15 mg tablet Take 1 tablet (15 mg total) by mouth daily 30 tablet 1    mometasone (NASONEX) 50 mcg/act nasal spray 2 sprays into each nostril daily      pantoprazole (PROTONIX) 40 mg tablet Take 40 mg by mouth daily      predniSONE 20 mg tablet Take 3 tablets for 2 days then 2 tablets for 3 days then 1 tablet for 3 days then stop 15 tablet 0    sulfamethoxazole-trimethoprim (BACTRIM DS) 800-160 mg per tablet Take 1 tablet by mouth every 12 (twelve) hours for 10 days 20 tablet 0     No current facility-administered medications on file prior to visit  Review of Systems   Constitutional: Negative  HENT: Negative  Negative for ear discharge, hearing loss, rhinorrhea and sore throat  Eyes: Negative  Respiratory: Negative  Negative for cough  Cardiovascular: Negative  Gastrointestinal: Negative  Negative for abdominal pain, diarrhea and vomiting  Endocrine: Negative  Genitourinary: Negative  Musculoskeletal: Negative  Negative for neck pain  Skin: Negative  Negative for rash  Allergic/Immunologic: Negative  Neurological: Negative  Negative for headaches  Hematological: Negative  Psychiatric/Behavioral: Negative  Objective:  Vitals:    07/10/20 1010   BP: 130/92   BP Location: Right arm   Patient Position: Sitting   Cuff Size: Standard   Pulse: 68   Temp: (!) 97 1 °F (36 2 °C)   TempSrc: Tympanic   SpO2: 99%   Weight: 89 2 kg (196 lb 9 6 oz)   Height: 5' 8" (1 727 m)     Body mass index is 29 89 kg/m²  Physical Exam   Constitutional: He appears well-developed and well-nourished  HENT:   Nose: Mucosal edema, rhinorrhea and sinus tenderness present  Right sinus exhibits maxillary sinus tenderness  Left sinus exhibits maxillary sinus tenderness  The patient has evidence of bilateral cerumen impaction  The TMs are not visualized  Eyes: Pupils are equal, round, and reactive to light  Neck: Normal range of motion  Neck supple  Cardiovascular: Normal rate, regular rhythm and intact distal pulses  Exam reveals no gallop and no friction rub  No murmur heard  Pulmonary/Chest: Effort normal and breath sounds normal  No respiratory distress  He has no wheezes  He has no rales  He exhibits no tenderness  Abdominal: Soft   Bowel sounds are normal  He exhibits no distension and no mass  There is no tenderness  There is no rebound and no guarding  Lymphadenopathy:     He has no cervical adenopathy  Ear cerumen removal  Date/Time: 7/10/2020 10:38 AM  Performed by: Salty Garcia DO  Authorized by: Salty Garcia DO     Patient location:  Clinic  Other Assisting Provider: No    Consent:     Consent obtained:  Verbal    Consent given by:  Patient    Risks discussed:  Bleeding, dizziness, infection, incomplete removal, pain and TM perforation    Alternatives discussed:  No treatment and observation  Universal protocol:     Procedure explained and questions answered to patient or proxy's satisfaction: yes      Patient identity confirmed:  Verbally with patient  Procedure details:     Local anesthetic:  None    Location:  L ear and R ear    Procedure type: irrigation with instrumentation      Instrumentation: curette      Approach:  External    Visualization (free text): There is a large amount of cerumen in both ears bilaterally  Equipment used:  Ear lavage and lighted cerumen loop  Post-procedure details:     Complication:  None    Hearing quality:  Improved    Patient tolerance of procedure: Tolerated well, no immediate complications  Comments: The ear lavage was performed successfully bilaterally  The TMs were visualized on both sides and were intact  There is no evidence of infection  The patient will follow-up as needed

## 2020-10-19 ENCOUNTER — OFFICE VISIT (OUTPATIENT)
Dept: OBGYN CLINIC | Facility: CLINIC | Age: 60
End: 2020-10-19
Payer: COMMERCIAL

## 2020-10-19 VITALS
HEIGHT: 68 IN | BODY MASS INDEX: 3.09 KG/M2 | WEIGHT: 20.38 LBS | SYSTOLIC BLOOD PRESSURE: 123 MMHG | DIASTOLIC BLOOD PRESSURE: 88 MMHG

## 2020-10-19 DIAGNOSIS — M19.042 PRIMARY OSTEOARTHRITIS OF BOTH HANDS: Primary | ICD-10-CM

## 2020-10-19 DIAGNOSIS — M19.041 PRIMARY OSTEOARTHRITIS OF BOTH HANDS: Primary | ICD-10-CM

## 2020-10-19 PROCEDURE — 1036F TOBACCO NON-USER: CPT | Performed by: ORTHOPAEDIC SURGERY

## 2020-10-19 PROCEDURE — 99213 OFFICE O/P EST LOW 20 MIN: CPT | Performed by: ORTHOPAEDIC SURGERY

## 2020-10-19 PROCEDURE — 3079F DIAST BP 80-89 MM HG: CPT | Performed by: ORTHOPAEDIC SURGERY

## 2020-10-19 PROCEDURE — 3074F SYST BP LT 130 MM HG: CPT | Performed by: ORTHOPAEDIC SURGERY

## 2020-11-19 DIAGNOSIS — I10 ESSENTIAL HYPERTENSION: ICD-10-CM

## 2020-11-19 RX ORDER — AMLODIPINE BESYLATE 5 MG/1
TABLET ORAL
Qty: 90 TABLET | Refills: 1 | Status: SHIPPED | OUTPATIENT
Start: 2020-11-19 | End: 2021-02-19 | Stop reason: SDUPTHER

## 2020-11-25 ENCOUNTER — TRANSCRIBE ORDERS (OUTPATIENT)
Dept: ADMINISTRATIVE | Facility: HOSPITAL | Age: 60
End: 2020-11-25

## 2020-11-25 ENCOUNTER — APPOINTMENT (OUTPATIENT)
Dept: LAB | Facility: CLINIC | Age: 60
End: 2020-11-25
Payer: COMMERCIAL

## 2020-11-25 DIAGNOSIS — N39.43 POST-VOID DRIBBLING: ICD-10-CM

## 2020-11-25 DIAGNOSIS — R30.0 DYSURIA: ICD-10-CM

## 2020-11-25 DIAGNOSIS — R30.0 DYSURIA: Primary | ICD-10-CM

## 2020-11-25 LAB — PSA SERPL-MCNC: 1.4 NG/ML (ref 0–4)

## 2020-11-25 PROCEDURE — 84153 ASSAY OF PSA TOTAL: CPT

## 2021-02-05 ENCOUNTER — OFFICE VISIT (OUTPATIENT)
Dept: OBGYN CLINIC | Facility: CLINIC | Age: 61
End: 2021-02-05
Payer: COMMERCIAL

## 2021-02-05 ENCOUNTER — APPOINTMENT (OUTPATIENT)
Dept: RADIOLOGY | Facility: CLINIC | Age: 61
End: 2021-02-05
Payer: COMMERCIAL

## 2021-02-05 VITALS
HEIGHT: 68 IN | WEIGHT: 198 LBS | TEMPERATURE: 98.1 F | BODY MASS INDEX: 30.01 KG/M2 | SYSTOLIC BLOOD PRESSURE: 131 MMHG | HEART RATE: 64 BPM | DIASTOLIC BLOOD PRESSURE: 83 MMHG

## 2021-02-05 DIAGNOSIS — M17.11 PRIMARY OSTEOARTHRITIS OF RIGHT KNEE: Primary | ICD-10-CM

## 2021-02-05 DIAGNOSIS — M25.561 RIGHT KNEE PAIN, UNSPECIFIED CHRONICITY: ICD-10-CM

## 2021-02-05 PROCEDURE — 99213 OFFICE O/P EST LOW 20 MIN: CPT | Performed by: ORTHOPAEDIC SURGERY

## 2021-02-05 PROCEDURE — 73562 X-RAY EXAM OF KNEE 3: CPT

## 2021-02-05 PROCEDURE — 73564 X-RAY EXAM KNEE 4 OR MORE: CPT

## 2021-02-05 NOTE — PROGRESS NOTES
Ortho Sports Medicine Knee New Patient Visit     Assesment:     64 y o  male right knee moderate lateral joint OA     Plan:    Conservative treatment:    Ice to knee for 20 minutes at least 1-2 times daily  PT for ROM/strengthening to knee, hip and core   brace ordered  Imaging: All imaging from today was reviewed by myself and explained to the patient  Injection:    No Injection planned at this time  May consider future corticosteroid injection depending on clinical exam/imaging  Surgery:     No surgery is recommended at this point, continue with conservative treatment plan as noted  Follow up:    No follow-ups on file  Chief Complaint   Patient presents with    Right Knee - Pain     History of Present Illness: The patient is a 64 y o  male 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 [employer] , referred to me by their primary care physician, seen in clinic for consultation of right knee pain  Pain is located anterior, lateral   The patient rates the pain as a 6/10  The pain has been present for 2 weeks  The patient did not recall sustaining any injury  Patient states that he is a avid exerciser and is very active  Patient states that he went for a run with his 6year-old daughter and after that his knee was swelling  Patient states that ever since then his knee continues to swell while working out  The mechanism of injury was overuse    The pain is characterized as dull, achy  The pain is present daily  Pain is improved by rest and NSAIDS  Pain is aggravated by stairs, squatting, weight bearing, running, walking and pivoting on a planted foot  Symptoms include catching and swelling  The patient has tried rest, ice and NSAIDS          Knee Surgical History:  2 previous lateral meniscectomies and patella chondroplasty - latest 1 being in 2015 by Dr Olguin at [facility]    Past Medical, Social and Family History:  Past Medical History:   Diagnosis Date    Arthritis Fingers, lower back ,right hip    Chronic pain     CPAP (continuous positive airway pressure) dependence     GERD (gastroesophageal reflux disease)     Hemangioma of skin     Hypertension     Irritable bowel syndrome     Lumbar spondylolysis     Multiple pigmented nevi     Obstructive sleep apnea syndrome     PONV (postoperative nausea and vomiting)     Premature atrial beats     Premature ventricular beats     Rectus diastasis     Sleep apnea     Umbilical hernia      Past Surgical History:   Procedure Laterality Date    HERNIA REPAIR      KNEE ARTHROSCOPY Right     [place] REPAIR UMBILICAL BTVA,5+M/S,MEPLY N/A 12/12/2017    Procedure: OPEN UMBILICAL HERNIA REPAIR WITH MESH;  Surgeon: Henry Worrell MD;  Location:  MAIN OR;  Service: General    WISDOM TOOTH EXTRACTION Left      Allergies   Allergen Reactions    Lisinopril Swelling    Ibuprofen Rash    Penicillins      Other reaction(s): Rash     Current Outpatient Medications on File Prior to Visit   Medication Sig Dispense Refill    amLODIPine (NORVASC) 5 mg tablet TAKE 1 TABLET BY MOUTH DAILY 90 tablet 1    Loratadine (CLARITIN) 10 MG CAPS Take 1 tablet by mouth daily      meloxicam (MOBIC) 15 mg tablet Take 1 tablet (15 mg total) by mouth daily 30 tablet 1    mometasone (NASONEX) 50 mcg/act nasal spray 2 sprays into each nostril daily      pantoprazole (PROTONIX) 40 mg tablet Take 40 mg by mouth daily      predniSONE 20 mg tablet Take 3 tablets for 2 days then 2 tablets for 3 days then 1 tablet for 3 days then stop (Patient not taking: Reported on 2/5/2021) 15 tablet 0     No current facility-administered medications on file prior to visit        Social History     Socioeconomic History    Marital status: /Civil Union     Spouse name: Not on file    Number of children: Not on file    Years of education: Not on file    Highest education level: Not on file   Occupational History    Not on file   Social Needs    Financial resource strain: Not hard at all   Lidia-Guille insecurity     Worry: Never true     Inability: Never true    Transportation needs     Medical: No     Non-medical: No   Tobacco Use    Smoking status: Never Smoker    Smokeless tobacco: Never Used   Substance and Sexual Activity    Alcohol use: Yes     Alcohol/week: 4 0 standard drinks     Types: 4 Cans of beer per week     Frequency: Monthly or less     Drinks per session: 1 or 2    Drug use: No    Sexual activity: Yes     Partners: Female     Birth control/protection: None   Lifestyle    Physical activity     Days per week: 3 days     Minutes per session: 60 min    Stress: Not at all   Relationships    Social connections     Talks on phone: More than three times a week     Gets together: Once a week     Attends Yazdanism service: More than 4 times per year     Active member of club or organization: Yes     Attends meetings of clubs or organizations: More than 4 times per year     Relationship status:     Intimate partner violence     Fear of current or ex partner: No     Emotionally abused: No     Physically abused: No     Forced sexual activity: No   Other Topics Concern    Not on file   Social History Narrative    Not on file     I have reviewed the past medical, surgical, social and family history, medications and allergies as documented in the EMR  Review of systems: ROS is negative other than that noted in the HPI  Constitutional: Negative for fatigue and fever  HENT: Negative for sore throat  Respiratory: Negative for shortness of breath  Cardiovascular: Negative for chest pain  Gastrointestinal: Negative for abdominal pain  Endocrine: Negative for cold intolerance and heat intolerance  Genitourinary: Negative for flank pain  Musculoskeletal: Negative for back pain  Skin: Negative for rash  Allergic/Immunologic: Negative for immunocompromised state  Neurological: Negative for dizziness     Psychiatric/Behavioral: Negative for agitation  Physical Exam:    Blood pressure 131/83, pulse 64, temperature 98 1 °F (36 7 °C), height 5' 8" (1 727 m), weight 89 8 kg (198 lb)  General/Constitutional: NAD, well developed, well nourished  HENT: Normocephalic, atraumatic  CV: Intact distal pulses, regular rate  Resp: No respiratory distress or labored breathing  Lymphatic: No lymphadenopathy palpated  Neuro: Alert and Oriented x 3, no focal deficits  Psych: Normal mood, normal affect, normal judgement, normal behavior  Skin: Warm, dry, no rashes, no erythema    Knee Exam (focused): RIGHT LEFT   ROM:   0-130 0-130   Palpation: Effusion mild negative     MJL tenderness Negative Negative     LJL tenderness Positive Negative   Meniscus: Rene Negative Negative    Apley's Compression Negative Negative   Instability: Varus stable stable     Valgus stable stable   Special Tests: Lachman Negative Negative     Posterior drawer Negative Negative     Anterior drawer Negative Negative     Pivot shift not tested not tested     Dial not tested not tested   Patella: Palpation lateral facet ttp no tenderness     Mobility 1/4 1/4     Apprehension Negative Negative   Other: Single leg 1/4 squat not tested not tested      LE NV Exam: +2 DP/PT pulses bilaterally  Sensation intact to light touch L2-S1 bilaterally     Bilateral hip ROM demonstrates no pain actively or passively    No calf tenderness to palpation bilaterally    Knee Imaging    X-rays of the right knee were reviewed, which demonstrate moderate lateral joint OA  I have reviewed the radiology report and do not currently have a radiology reading from [facility], but will check the result once the reading is performed      Scribe Attestation    I,:  Claudell Crete am acting as a scribe while in the presence of the attending physician :       I,:  Jonn Gallardo DO personally performed the services described in this documentation    as scribed in my presence :

## 2021-02-08 ENCOUNTER — TELEPHONE (OUTPATIENT)
Dept: OBGYN CLINIC | Facility: HOSPITAL | Age: 61
End: 2021-02-08

## 2021-02-08 DIAGNOSIS — M19.041 PRIMARY OSTEOARTHRITIS OF BOTH HANDS: ICD-10-CM

## 2021-02-08 DIAGNOSIS — M19.042 PRIMARY OSTEOARTHRITIS OF BOTH HANDS: ICD-10-CM

## 2021-02-08 RX ORDER — MELOXICAM 15 MG/1
15 TABLET ORAL DAILY
Qty: 30 TABLET | Refills: 1 | Status: SHIPPED | OUTPATIENT
Start: 2021-02-08 | End: 2021-05-13 | Stop reason: SDUPTHER

## 2021-02-08 NOTE — TELEPHONE ENCOUNTER
Patient sees Dr Thom Orr  Patient is calling in to get a medication refill on Meloxicam 15 mg tabs  He currently is all out of this medication and is asking if this can be refilled and [facility] to [facility] [place] on file and for a call once this is sent            Call back# [phone]

## 2021-02-19 DIAGNOSIS — I10 ESSENTIAL HYPERTENSION: ICD-10-CM

## 2021-02-19 RX ORDER — AMLODIPINE BESYLATE 5 MG/1
5 TABLET ORAL DAILY
Qty: 90 TABLET | Refills: 1 | Status: SHIPPED | OUTPATIENT
Start: 2021-02-19 | End: 2021-05-13 | Stop reason: SDUPTHER

## 2021-03-19 ENCOUNTER — OFFICE VISIT (OUTPATIENT)
Dept: OBGYN CLINIC | Facility: CLINIC | Age: 61
End: 2021-03-19
Payer: COMMERCIAL

## 2021-03-19 VITALS
DIASTOLIC BLOOD PRESSURE: 89 MMHG | SYSTOLIC BLOOD PRESSURE: 134 MMHG | HEART RATE: 60 BPM | TEMPERATURE: 97.7 F | WEIGHT: 197 LBS | HEIGHT: 68 IN | BODY MASS INDEX: 29.86 KG/M2

## 2021-03-19 DIAGNOSIS — M19.042 PRIMARY OSTEOARTHRITIS OF BOTH HANDS: Primary | ICD-10-CM

## 2021-03-19 DIAGNOSIS — M19.041 PRIMARY OSTEOARTHRITIS OF BOTH HANDS: Primary | ICD-10-CM

## 2021-03-19 PROCEDURE — 99213 OFFICE O/P EST LOW 20 MIN: CPT | Performed by: ORTHOPAEDIC SURGERY

## 2021-03-19 PROCEDURE — 1036F TOBACCO NON-USER: CPT | Performed by: ORTHOPAEDIC SURGERY

## 2021-03-19 PROCEDURE — 3008F BODY MASS INDEX DOCD: CPT | Performed by: ORTHOPAEDIC SURGERY

## 2021-03-19 NOTE — PROGRESS NOTES
Ortho Sports Medicine Knee Visit     Assesment:      64 y o  male right knee moderate lateral joint OA improving with PT    Plan:    Conservative treatment:    Ice to knee for 20 minutes at least 1-2 times daily  PT for ROM/strengthening to knee, hip and core  Imaging: All imaging from today was reviewed by myself and explained to the patient  Injection:    No Injection planned at this time  May consider future corticosteroid injection depending on clinical exam/imaging  May consider future viscosupplementation injection depending on clinical exam/imaging  Surgery:     No surgery is recommended at this point, continue with conservative treatment plan as noted  History of Present Illness: The patient is returns for follow up of right knee  Since the prior visit, He reports significant improvement  Pain is located anterior, posterior, lateral      Pain is improved by rest, ice, NSAIDS, physical therapy  brace and injection  Pain is aggravated by weight bearing  Symptoms include clicking, catching and popping  The patient has tried rest             I have reviewed the past medical, surgical, social and family history, medications and allergies as documented in the EMR  Review of systems: ROS is negative other than that noted in the HPI  Constitutional: Negative for fatigue and fever     Cardiovascular: Negative for chest pain  Pulmonary: negative for shortness of breath    PMH/PSH:  Past Medical History:   Diagnosis Date    Arthritis     Fingers, lower back ,right hip    Chronic pain     CPAP (continuous positive airway pressure) dependence     GERD (gastroesophageal reflux disease)     Hemangioma of skin     Hypertension     Irritable bowel syndrome     Lumbar spondylolysis     Multiple pigmented nevi     Obstructive sleep apnea syndrome     PONV (postoperative nausea and vomiting)     Premature atrial beats     Premature ventricular beats     Rectus diastasis     Sleep apnea     Umbilical hernia      Past Surgical History:   Procedure Laterality Date    HERNIA REPAIR      KNEE ARTHROSCOPY Right     [place] REPAIR UMBILICAL JEDC,0+Q/Z,BLNEJ N/A 12/12/2017    Procedure: OPEN UMBILICAL HERNIA REPAIR WITH MESH;  Surgeon: Dimitry Matthews MD;  Location:  MAIN OR;  Service: General    WISDOM TOOTH EXTRACTION Left         Physical Exam:    Blood pressure 134/89, pulse 60, temperature 97 7 °F (36 5 °C), height 5' 8" (1 727 m), weight 89 4 kg (197 lb)  General/Constitutional: NAD, well developed, well nourished  HENT: Normocephalic, atraumatic  CV: Intact distal pulses, regular rate  Resp: No respiratory distress or labored breathing  Lymphatic: No lymphadenopathy palpated  Neuro: Alert and Oriented x 3, no focal deficits  Psych: Normal mood, normal affect, normal judgement, normal behavior  Skin: Warm, dry, no rashes, no erythema       Knee Exam (focused):                   RIGHT LEFT   ROM:   0-130 0-130   Palpation: Effusion negative negative     MJL tenderness Negative Negative     LJL tenderness Positive Negative   Instability: Varus stable stable     Valgus stable stable   Special Tests: Lachman Negative Negative     Posterior drawer Negative Negative     Anterior drawer Negative Negative     Pivot shift not tested not tested     Dial not tested not tested   Patella: Palpation no tenderness no tenderness     Mobility 1/4 1/4     Apprehension Negative Negative   Other: Single leg 1/4 squat not tested not tested      LE NV Exam: +2 DP/PT pulses bilaterally  Sensation intact to light touch L2-S1 bilaterally    No calf tenderness to palpation bilaterally

## 2021-04-27 ENCOUNTER — TELEMEDICINE (OUTPATIENT)
Dept: FAMILY MEDICINE CLINIC | Facility: CLINIC | Age: 61
End: 2021-04-27
Payer: COMMERCIAL

## 2021-04-27 VITALS — WEIGHT: 195 LBS | HEIGHT: 68 IN | BODY MASS INDEX: 29.55 KG/M2 | TEMPERATURE: 97.3 F

## 2021-04-27 DIAGNOSIS — U07.1 COVID-19: Primary | ICD-10-CM

## 2021-04-27 DIAGNOSIS — J34.89 SINUS PRESSURE: ICD-10-CM

## 2021-04-27 DIAGNOSIS — R05.9 COUGH: ICD-10-CM

## 2021-04-27 PROCEDURE — 99213 OFFICE O/P EST LOW 20 MIN: CPT | Performed by: NURSE PRACTITIONER

## 2021-04-27 RX ORDER — ALBUTEROL SULFATE 90 UG/1
2 AEROSOL, METERED RESPIRATORY (INHALATION) EVERY 6 HOURS PRN
Qty: 1 INHALER | Refills: 0 | Status: SHIPPED | OUTPATIENT
Start: 2021-04-27 | End: 2021-05-13

## 2021-04-27 RX ORDER — DEXTROMETHORPHAN HYDROBROMIDE AND PROMETHAZINE HYDROCHLORIDE 15; 6.25 MG/5ML; MG/5ML
5 SOLUTION ORAL 4 TIMES DAILY PRN
Qty: 118 ML | Refills: 1 | Status: SHIPPED | OUTPATIENT
Start: 2021-04-27 | End: 2021-05-13

## 2021-04-27 RX ORDER — METHYLPREDNISOLONE 4 MG/1
TABLET ORAL
Qty: 21 EACH | Refills: 0 | Status: SHIPPED | OUTPATIENT
Start: 2021-04-27 | End: 2021-05-13

## 2021-04-27 NOTE — PROGRESS NOTES
Virtual Regular Visit      Assessment/Plan:    Problem List Items Addressed This Visit     None      Visit Diagnoses     COVID-19    -  Primary               Reason for visit is   Chief Complaint   Patient presents with    sinus congestion     yellow mucous drainage    COVID-19     test was positive - done yesterday    Cough    Virtual Regular Visit        Encounter provider SOM Dawson    Provider located at [address] [place] [address]  [address]  [address] , [address] [id number] [phone]      Recent Visits  No visits were found meeting these conditions  Showing recent visits within past 7 days and meeting all other requirements     Today's Visits  Date Type Provider Dept   04/27/21 Telemedicine SOM Dawson [facility]   Showing today's visits and meeting all other requirements     Future Appointments  No visits were found meeting these conditions  Showing future appointments within next 150 days and meeting all other requirements        The patient was identified by name and date of birth  Sherron Conroy was informed that this is a telemedicine visit and that the visit is being conducted through [address] [place] [address] Now and patient was informed that this is a secure, HIPAA-compliant platform  He agrees to proceed     My office door was closed  No one else was in the room  He acknowledged consent and understanding of privacy and security of the video platform  The patient has agreed to participate and understands they can discontinue the visit at any time  Patient is aware this is a billable service  Subjective  Sherron Conroy is a 64 y o  male    Cough  This is a new (began 4/19 "cold type symptoms" tested + covid 4/26/2021) problem  The current episode started in the past 7 days  The problem has been gradually worsening  The problem occurs every few minutes  The cough is non-productive   Associated symptoms include a fever, headaches, myalgias, nasal congestion and wheezing  Pertinent negatives include no ear congestion, ear pain, postnasal drip, shortness of breath or weight loss  Chest pain: chest tightness  Nothing aggravates the symptoms  Treatments tried: nyquil  The treatment provided moderate relief  His past medical history is significant for environmental allergies  Past Medical History:   Diagnosis Date    Arthritis     Fingers, lower back ,right hip    Chronic pain     CPAP (continuous positive airway pressure) dependence     GERD (gastroesophageal reflux disease)     Hemangioma of skin     Hypertension     Irritable bowel syndrome     Lumbar spondylolysis     Multiple pigmented nevi     Obstructive sleep apnea syndrome     PONV (postoperative nausea and vomiting)     Premature atrial beats     Premature ventricular beats     Rectus diastasis     Sleep apnea     Umbilical hernia        Past Surgical History:   Procedure Laterality Date    HERNIA REPAIR      KNEE ARTHROSCOPY Right     IA REPAIR UMBILICAL DWAV,6+V/C,PCAKM N/A 12/12/2017    Procedure: OPEN UMBILICAL HERNIA REPAIR WITH MESH;  Surgeon: Lizz Shafer MD;  Location: [facility] OR;  Service: General    WISDOM TOOTH EXTRACTION Left        Current Outpatient Medications   Medication Sig Dispense Refill    amLODIPine (NORVASC) 5 mg tablet Take 1 tablet (5 mg total) by mouth daily 90 tablet 1    Loratadine (CLARITIN) 10 MG CAPS Take 1 tablet by mouth daily      meloxicam (MOBIC) 15 mg tablet Take 1 tablet (15 mg total) by mouth daily 30 tablet 1    mometasone (NASONEX) 50 mcg/act nasal spray 2 sprays into each nostril daily      pantoprazole (PROTONIX) 40 mg tablet Take 40 mg by mouth daily       No current facility-administered medications for this visit  Allergies   Allergen Reactions    Lisinopril Swelling    Ibuprofen Rash    Penicillins      Other reaction(s): Rash       Review of Systems   Constitutional: Positive for fever  Negative for weight loss  HENT: Negative for ear pain and postnasal drip  Eyes: Negative  Respiratory: Positive for cough and wheezing  Negative for shortness of breath  Cardiovascular: Negative  Chest pain: chest tightness  Gastrointestinal: Negative  Endocrine: Negative  Genitourinary: Negative  Musculoskeletal: Positive for myalgias  Skin: Negative  Allergic/Immunologic: Positive for environmental allergies  Neurological: Positive for headaches  Hematological: Negative  Psychiatric/Behavioral: Negative  Video Exam    Vitals:    04/27/21 1417   Temp: (!) 97 3 °F (36 3 °C)   TempSrc: Tympanic   Weight: 88 5 kg (195 lb)   Height: 5' 8" (1 727 m)       Physical Exam  Vitals signs and nursing note reviewed  Constitutional:       Appearance: He is ill-appearing  HENT:      Head: Normocephalic and atraumatic  Nose: Congestion present  Eyes:      General:         Right eye: No discharge  Left eye: No discharge  Extraocular Movements: Extraocular movements intact  Conjunctiva/sclera: Conjunctivae normal    Neck:      Musculoskeletal: Normal range of motion  Pulmonary:      Effort: Pulmonary effort is normal  No respiratory distress  Musculoskeletal: Normal range of motion  Skin:     General: Skin is dry  Neurological:      Mental Status: He is alert and oriented to person, place, and time  Psychiatric:         Mood and Affect: Mood normal          Behavior: Behavior normal          Thought Content: Thought content normal          Judgment: Judgment normal           I spent 15 minutes directly with the patient during this visit      VIRTUAL VISIT DISCLAIMER    Maira Maxwell acknowledges that he has consented to an online visit or consultation   He understands that the online visit is based solely on information provided by him, and that, in the absence of a face-to-face physical evaluation by the physician, the diagnosis he receives is both limited and provisional in terms of accuracy and completeness  This is not intended to replace a full medical face-to-face evaluation by the physician  Acosta Baez understands and accepts these terms

## 2021-04-28 DIAGNOSIS — R05.9 COUGH: Primary | ICD-10-CM

## 2021-04-28 RX ORDER — FLUTICASONE PROPIONATE 110 UG/1
2 AEROSOL, METERED RESPIRATORY (INHALATION) 2 TIMES DAILY
Qty: 1 INHALER | Refills: 0 | Status: SHIPPED | OUTPATIENT
Start: 2021-04-28 | End: 2021-05-13

## 2021-04-30 ENCOUNTER — TELEMEDICINE (OUTPATIENT)
Dept: FAMILY MEDICINE CLINIC | Facility: CLINIC | Age: 61
End: 2021-04-30
Payer: COMMERCIAL

## 2021-04-30 VITALS — HEIGHT: 67 IN | TEMPERATURE: 97.7 F | WEIGHT: 195 LBS | BODY MASS INDEX: 30.61 KG/M2

## 2021-04-30 DIAGNOSIS — J01.01 ACUTE RECURRENT MAXILLARY SINUSITIS: ICD-10-CM

## 2021-04-30 DIAGNOSIS — R05.9 COUGH: Primary | ICD-10-CM

## 2021-04-30 PROCEDURE — 99213 OFFICE O/P EST LOW 20 MIN: CPT | Performed by: NURSE PRACTITIONER

## 2021-04-30 PROCEDURE — 1036F TOBACCO NON-USER: CPT | Performed by: NURSE PRACTITIONER

## 2021-04-30 PROCEDURE — 3725F SCREEN DEPRESSION PERFORMED: CPT | Performed by: NURSE PRACTITIONER

## 2021-04-30 PROCEDURE — 3008F BODY MASS INDEX DOCD: CPT | Performed by: NURSE PRACTITIONER

## 2021-04-30 RX ORDER — BENZONATATE 100 MG/1
100 CAPSULE ORAL 3 TIMES DAILY PRN
Qty: 20 CAPSULE | Refills: 0 | Status: SHIPPED | OUTPATIENT
Start: 2021-04-30 | End: 2021-05-13

## 2021-04-30 RX ORDER — DOXYCYCLINE 100 MG/1
100 CAPSULE ORAL 2 TIMES DAILY
Qty: 14 CAPSULE | Refills: 0 | Status: SHIPPED | OUTPATIENT
Start: 2021-04-30 | End: 2021-05-07

## 2021-04-30 NOTE — PROGRESS NOTES
Virtual Regular Visit      Assessment/Plan:    Problem List Items Addressed This Visit     None      Visit Diagnoses     Cough    -  Primary    Relevant Medications    benzonatate (TESSALON PERLES) 100 mg capsule    Acute recurrent maxillary sinusitis        Relevant Medications    doxycycline monohydrate (MONODOX) 100 mg capsule               Reason for visit is   Chief Complaint   Patient presents with    COVID follow-up     Patient reports that his symptoms are a little better than they were on Monday  His fever, muscle aches, headaches, and chest tightness have resolved  His only current symptoms are like those of a head cold - congestion with yellow mucous drainage, sore throat, and cough   Virtual Regular Visit        Encounter provider SOM Solomon    Provider located at [address] [place] [id number] [phone]      Recent Visits  Date Type Provider Dept   04/27/21 Telemedicine Abril Armijo Kitshade recent visits within past 7 days and meeting all other requirements     Today's Visits  Date Type Provider Dept   04/30/21 Telemedicine SOM Solomon [facility]   Showing today's visits and meeting all other requirements     Future Appointments  No visits were found meeting these conditions  Showing future appointments within next 150 days and meeting all other requirements        The patient was identified by name and date of birth  Gina Ramires was informed that this is a telemedicine visit and that the visit is being conducted through [address] [place] [address] Now and patient was informed that this is a secure, HIPAA-compliant platform  He agrees to proceed     My office door was closed  No one else was in the room  He acknowledged consent and understanding of privacy and security of the video platform   The patient has agreed to participate and understands they can discontinue the visit at any time     Patient is aware this is a billable service  Subjective  Gloria Posada is a 64 y o  male    Virtual visit today for covid follow up  Tested 4/26 but symptoms began 4/19  Chest tightness has resolved, post nasal drip with cough remains  Producing yellow mucus from sinuses  Remains congested , night sweats continued until last evening, none last night  Reports sense of smell has returned, feeling 75 % improved  Hoping to return to work 5/3  Since his sinus congestion has remained unchanged and history of recurrent sinus infections, will prescribe antibiotic for sinusitis        Past Medical History:   Diagnosis Date    Arthritis     Fingers, lower back ,right hip    Chronic pain     CPAP (continuous positive airway pressure) dependence     GERD (gastroesophageal reflux disease)     Hemangioma of skin     Hypertension     Irritable bowel syndrome     Lumbar spondylolysis     Multiple pigmented nevi     Obstructive sleep apnea syndrome     PONV (postoperative nausea and vomiting)     Premature atrial beats     Premature ventricular beats     Rectus diastasis     Sleep apnea     Umbilical hernia        Past Surgical History:   Procedure Laterality Date    HERNIA REPAIR      KNEE ARTHROSCOPY Right     ID REPAIR UMBILICAL PQBD,3+Q/H,STZXQ N/A 12/12/2017    Procedure: OPEN UMBILICAL HERNIA REPAIR WITH MESH;  Surgeon: Ramiro Brariga MD;  Location: [facility] OR;  Service: General    WISDOM TOOTH EXTRACTION Left        Current Outpatient Medications   Medication Sig Dispense Refill    albuterol (PROVENTIL HFA,VENTOLIN HFA) 90 mcg/act inhaler Inhale 2 puffs every 6 (six) hours as needed for wheezing 1 Inhaler 0    amLODIPine (NORVASC) 5 mg tablet Take 1 tablet (5 mg total) by mouth daily 90 tablet 1    fluticasone (FLOVENT HFA) 110 MCG/ACT inhaler Inhale 2 puffs 2 (two) times a day Rinse mouth after use   1 Inhaler 0    Loratadine (CLARITIN) 10 MG CAPS Take 1 tablet by mouth daily      meloxicam (MOBIC) 15 mg tablet Take 1 tablet (15 mg total) by mouth daily 30 tablet 1    methylPREDNISolone 4 MG tablet therapy pack Use as directed on package 21 each 0    mometasone (NASONEX) 50 mcg/act nasal spray 2 sprays into each nostril daily      pantoprazole (PROTONIX) 40 mg tablet Take 40 mg by mouth daily      Promethazine-DM (PHENERGAN-DM) 6 25-15 mg/5 mL oral syrup Take 5 mL by mouth 4 (four) times a day as needed for cough 118 mL 1    benzonatate (TESSALON PERLES) 100 mg capsule Take 1 capsule (100 mg total) by mouth 3 (three) times a day as needed for cough 20 capsule 0    doxycycline monohydrate (MONODOX) 100 mg capsule Take 1 capsule (100 mg total) by mouth 2 (two) times a day for 7 days 14 capsule 0     No current facility-administered medications for this visit  Allergies   Allergen Reactions    Lisinopril Swelling    Ibuprofen Rash    Penicillins      Other reaction(s): Rash       Review of Systems   Constitutional: Positive for fatigue  Negative for activity change, appetite change and chills  HENT: Positive for congestion, sinus pressure, sinus pain and sore throat  Negative for trouble swallowing  Eyes: Negative  Respiratory: Negative  Negative for chest tightness and shortness of breath  Cardiovascular: Negative  Gastrointestinal: Negative  Endocrine: Negative  Genitourinary: Negative  Musculoskeletal: Negative  Negative for arthralgias and myalgias  Skin: Negative  Allergic/Immunologic: Negative  Neurological: Negative  Hematological: Negative  Psychiatric/Behavioral: Negative  Video Exam    Vitals:    04/30/21 1111   Temp: 97 7 °F (36 5 °C)   TempSrc: Temporal   Weight: 88 5 kg (195 lb)   Height: 5' 7" (1 702 m)       Physical Exam  Vitals signs and nursing note reviewed  Constitutional:       Appearance: Normal appearance  HENT:      Head: Normocephalic and atraumatic  Nose: Congestion present     Eyes: General:         Right eye: No discharge  Left eye: No discharge  Extraocular Movements: Extraocular movements intact  Conjunctiva/sclera: Conjunctivae normal    Neck:      Musculoskeletal: Normal range of motion  Pulmonary:      Effort: Pulmonary effort is normal  No respiratory distress  Musculoskeletal: Normal range of motion  Skin:     General: Skin is dry  Neurological:      Mental Status: He is oriented to person, place, and time  Psychiatric:         Mood and Affect: Mood normal          Behavior: Behavior normal          Thought Content: Thought content normal          Judgment: Judgment normal           I spent 15 minutes directly with the patient during this visit      VIRTUAL VISIT DISCLAIMER    Sugey Quintero acknowledges that he has consented to an online visit or consultation  He understands that the online visit is based solely on information provided by him, and that, in the absence of a face-to-face physical evaluation by the physician, the diagnosis he receives is both limited and provisional in terms of accuracy and completeness  This is not intended to replace a full medical face-to-face evaluation by the physician  Sugey Quintero understands and accepts these terms

## 2021-04-30 NOTE — PATIENT INSTRUCTIONS
COVID-19 (Coronavirus Disease 2019)   WHAT YOU NEED TO KNOW:   COVID-19 is the disease caused by the novel (new) coronavirus first discovered in December 2019  Coronaviruses generally cause upper respiratory (nose, throat, and lung) infections, such as a cold  The new virus can also cause serious lower respiratory conditions, such as pneumonia or acute respiratory distress syndrome (ARDS)  Anyone can develop serious problems from the new virus, but your risk is higher if you are 65 or older  A weak immune system, diabetes, or a heart or lung condition can also increase your risk  DISCHARGE INSTRUCTIONS:   If you think you or someone you know may be infected:  Do the following to protect others:  · If emergency care is needed,  tell the  about the possible infection, or call ahead and tell the emergency department  · Call a healthcare provider  for instructions if symptoms are mild  Anyone who may be infected should not  arrive without calling first  The provider will need to protect staff members and other patients  · The person who may be infected needs to wear a face covering  while getting medical care  This will help lower the risk of infecting others  Coverings are not used for anyone who is younger than 2 years, has breathing problems, or cannot remove it  The provider can give you instructions for anyone who cannot wear a covering  Call your local emergency number (911 in the [address] [facility]) or go to the emergency department if:   · You have trouble breathing or shortness of breath at rest     · You have chest pain or pressure that lasts longer than 5 minutes  · You become confused or hard to wake  · Your lips or face are blue  · You have a fever of 104°F (40°C) or higher  Call your doctor if:   · You do not  have symptoms of COVID-19 but had close physical contact within 14 days with someone who tested positive  · You have questions or concerns about your condition or care      Medicines: You may need any of the following for mild symptoms:  · Decongestants  help reduce nasal congestion and help you breathe more easily  If you take decongestant pills, they may make you feel restless or cause problems with your sleep  Do not use decongestant sprays for more than a few days  · Cough suppressants  help reduce coughing  Ask your healthcare provider which type of cough medicine is best for you  · Acetaminophen  decreases pain and fever  It is available without a doctor's order  Ask how much to take and how often to take it  Follow directions  Read the labels of all other medicines you are using to see if they also contain acetaminophen, or ask your doctor or pharmacist  Acetaminophen can cause liver damage if not taken correctly  Do not use more than 4 grams (4,000 milligrams) total of acetaminophen in one day  · NSAIDs , such as ibuprofen, help decrease swelling, pain, and fever  NSAIDs can cause stomach bleeding or kidney problems in certain people  If you take blood thinner medicine, always ask your healthcare provider if NSAIDs are safe for you  Always read the medicine label and follow directions  · Take your medicine as directed  Contact your healthcare provider if you think your medicine is not helping or if you have side effects  Tell him or her if you are allergic to any medicine  Keep a list of the medicines, vitamins, and herbs you take  Include the amounts, and when and why you take them  Bring the list or the pill bottles to follow-up visits  Carry your medicine list with you in case of an emergency  How the 2019 coronavirus spreads: The virus spreads quickly and easily  You can become infected if you are in contact with a large amount of the virus, even for a short time  You can also become infected by being around a small amount of virus for a long time   The following are ways the virus is thought to spread, but more information may be coming:  · Droplets are the most common way all coronaviruses spread  The virus can travel in droplets that form when a person talks, coughs, or sneezes  Anyone who breathes in the droplets or gets them in his or her eyes can become infected with the virus  Close personal contact with an infected person is thought to be the main way the virus spreads  Close personal contact means you are within 6 feet (2 meters) of the person  · Person-to-person contact can spread the virus  For example, a person with the virus on his or her hands can spread it by shaking hands with someone  At this time, it does not appear that the virus can be passed to a baby during pregnancy or delivery  The baby can be infected after he or she is born through person-to-person contact  The virus also does not appear to spread in breast milk  If you are pregnant or breastfeeding, talk to your healthcare provider or obstetrician about any concerns you have  · The virus can stay on objects and surfaces  A person can get the virus on his or her hands by touching the object or surface  Infection happens if the person then touches his or her eyes or mouth with unwashed hands  It is not yet known how long the virus can stay on an object or surface  That is why it is important to clean all surfaces that are used regularly  · An infected animal may be able to infect a person who touches it  This may happen at live markets or on a farm  How everyone can lower the risk for COVID-19:  The best way to prevent infection is to avoid anyone who is infected, but this can be hard to do  An infected person can spread the virus before signs or symptoms begin, or even if signs or symptoms never develop  The following can help lower the risk for infection:      · Wash your hands often throughout the day  Use soap and water  Rub your soapy hands together, lacing your fingers  Wash the front and back of each hand, and in between your fingers   Use the fingers of one hand to scrub under the fingernails of the other hand  Wash for at least 20 seconds  Rinse with warm, running water for several seconds  Then dry your hands with a clean towel or paper towel  Use hand  that contains alcohol if soap and water are not available  Do not touch your eyes, nose, or mouth without washing your hands first  Teach children how to wash their hands and use hand   · Cover a sneeze or cough  This prevents droplets from traveling from you to others  Turn your face away and cover your mouth and nose with a tissue  Throw the tissue away  Use the bend of your arm if a tissue is not available  Then wash your hands well with soap and water or use hand   Turn and cover your face if you are around someone who is sneezing or coughing  Teach children how to cover a cough or sneeze  · Follow worldwide, national, and local social distancing guidelines  Social distancing means people avoid close physical contact so the virus cannot spread from one person to another  Keep at least 6 feet (2 meters) between you and others  Also keep this distance from anyone who comes to your home, such as someone making a delivery  · Make a habit of not touching your face  It is not known how long the virus can stay on objects and surfaces  If you get the virus on your hands, you can transfer it to your eyes, nose, or mouth and become infected  You can also transfer it to objects, surfaces, or people  Be aware of what you touch when you go out  Examples include handrails and elevator buttons  Try not to touch anything with bare hands unless it is necessary  Wash your hands before you leave your home and when you return  · Clean and disinfect high-touch surfaces and objects often  Use a disinfecting solution or wipes  You can make a solution by diluting 4 teaspoons of bleach in 1 quart (4 cups) of water   Clean and disinfect even if you think no one living in or coming to your home is infected with the virus  You can wipe items with a disinfecting cloth before you bring them into your home  Wash your hands after you handle anything you bring into your home  · Make your immune system as healthy as possible  A weakened immune system makes you more vulnerable to the new coronavirus  No COVID-19 vaccine is available yet  Vaccines such as the flu and pneumonia vaccines can help your immune system  Your healthcare provider can tell you which vaccines to get, and when to get them  Keep your immune system as strong as possible  Do not smoke  Eat healthy foods, exercise regularly, and try to manage stress  Go to bed and wake up at the same times each day  Social distancing guidelines:  National and local social distancing rules vary  Rules may change over time as restrictions are lifted  Restrictions may return if an outbreak happens where you live  It is important to know and follow all current social distancing rules in your area  The following are general guidelines:  · Limit trips out of your home  You may be able to have food, medicines, and other supplies delivered  If possible, have delivered items left at your door or other area  Try not to have someone hand you an item  You will be so close to the person that the virus can spread between you  · Do not have close physical contact with anyone who does not live in your home  Do not shake hands with, hug, or kiss a person as a greeting  Stand or walk as far from others as possible  If you must use public transportation (such as a bus or subway), try to sit or stand away from others  You can stay safely connected with others through phone calls, e-mail messages, social media websites, and video chats  Check in on anyone who may be having a hard time socially distancing, or who lives alone  Ask administrators at nursing homes or long-term care facilities how you can safely communicate with someone living there      · Wear a cloth face covering around others who do not live in your home  Face coverings help prevent the virus from spreading to others in droplets  You can use a clear face covering if someone needs to read your lips  This is a cloth covering that has plastic over the mouth area so your lips can be seen  Do not use coverings that have breathing valves or vents  The virus can travel out of the valve or vent and be spread to others  Do not take your covering off to talk, cough, or sneeze  Do not use coverings on children younger than 2 years or on anyone who has breathing problems or cannot remove it  · Only allow medical or other necessary professionals into your home  Wear your face covering, and remind professionals to wear a face covering  Remind them to wash their hands when they arrive and before they leave  Do not  let anyone who does not live in your home in, even if the person is not sick  A person can pass the virus to others before symptoms of COVID-19 begin  Some people never even develop symptoms  Children commonly have mild symptoms or no symptoms  It may be hard to tell a child not to hug or kiss you  Explain that this is how he or she can help you stay healthy  · Do not go to someone else's home unless it is necessary  Do not go over to visit, even if the person is lonely  Only go if you need to help him or her  Make sure you both wear face coverings while you are there  · Avoid large gatherings and crowds  Gatherings or crowds of 10 or more individuals can cause the virus to spread  Examples of gatherings include parties, sporting events, Christianity services, and conferences  Crowds may form at beaches, schulz, and tourist attractions  Protect yourself by staying away from large gatherings and crowds  · Ask your healthcare provider for other ways to have appointments  You may be able to have appointments without having to go into the provider's office  Some providers offer phone, video, or other types of appointments  You may also be able to get prescriptions for a few months of your medicines at a time  · Stay safe if you must go out to work  You may have a job that can only be done outside your home  Keep physical distance between you and other workers as much as possible  Follow your employer's rules so everyone stays safe  If you have COVID-19 and are recovering at home:  Healthcare providers will give you specific instructions to follow  The following are general guidelines to remind you how to keep others safe until you are well:  · Wash your hands often  Use soap and water as much as possible  You can use hand  that contains alcohol if soap and water are not available  Do not share towels with anyone  If you use paper towels, throw them away in a lined trash can kept in your room or area  Use a covered trash can, if possible  · Do not go out of your home unless it is necessary  You may have to go to your healthcare provider's office for check-ups or to get prescription refills  Do not arrive at the provider's office without an appointment  Providers have to make their offices safe for staff and other patients  · Do not have close physical contact with anyone unless it is necessary  Only have close physical contact with a person giving direct care, or a baby or child you must care for  Family members and friends should not visit you  If possible, stay in a separate area or room of your home if you live with others  No one should go into the area or room except to give you care  You can visit with others by phone, video chat, e-mail, or similar systems  It is important to stay connected with others in your life while you recover  · Wear a face covering while others are near you  This can help prevent droplets from spreading the virus when you talk, sneeze, or cough  Put the covering on before anyone comes into your room or area   Remind the person to cover his or her nose and mouth before going in to provide care for you  · Do not share items  Do not share dishes, towels, or other items with anyone  Items need to be washed after you use them  · Protect your baby  Wash your hands with soap and water often throughout the day  Wear a clean face covering while you breastfeed, or while you express or pump breast milk  If possible, ask someone who is well to care for your baby  You can put breast milk in bottles for the person to use, if needed  Talk to your healthcare provider if you have any questions or concerns about caring for or bonding with your baby  He or she will tell you when to bring your baby in for check-ups and vaccines  He or she will also tell you what to do if you think your baby was infected with the new virus  · Do not handle live animals  Until more is known, it is best not to touch, play with, or handle live animals  Some animals, including pets, have been infected with the new coronavirus  Do not handle or care for animals until you are well  Care includes feeding, petting, and cuddling your pet  Do not let your pet lick you or share your food  Ask someone who is not infected to take care of your pet, if possible  If you must care for a pet, wear a face covering  Wash your hands before and after you give care  · Follow directions from your healthcare provider for being around others after you recover  You will need to wait at least 10 days after symptoms first appeared  Then you will need to have no fever for 24 hours without fever medicine, and no other symptoms  A loss of taste or smell may continue for several months  It is considered okay to be around others if this is your only symptom  It is not known for sure if or for how long a recovered person can pass the virus to others  Your provider may give you instructions, such as continuing social distancing or wearing a face covering around others      How to take care of someone who has COVID-19:  If the person lives in another home, arrange for a time to give care  Remember to bring a few pairs of disposable gloves and a cloth face covering  The following are general guidelines to help you safely care for anyone who has COVID-19:  · Wash your hands often  Wash before and after you go into the person's home, area, or room  Throw paper towels away in a lined trash can that has a lid, if possible  · Do not allow others to go near the person  No one should come into the person's home unless it is necessary  If possible, the person should be in a separate area or room if he or she lives with others  Keep the room's door shut unless you need to go in or out  Have others call, video chat, or e-mail the person if he or she is feeling well enough  The person may feel lonely if he or she is kept separate for a long period of time  Safe communication can help him or her stay connected to family and friends  · Make sure the person's room has good air flow  You may be able to open the window if the weather allows  An air conditioner can also be turned on to help air move  · Contact the person before you go in to give care  Make sure the person is wearing a face covering  Remind him or her to wash his or her hands with soap and water  He or she can use hand  that contains alcohol if soap and water are not available  Put on a face covering before you go in to give care  · Wear gloves while you give care and clean  Clean items the person uses often  Clean countertops, cooking surfaces, and the fronts and insides of the microwave and refrigerator  Clean the shower, toilet, the area around the toilet, the sink, the area around the sink, and faucets  Gather used laundry or bedding  Wash and dry items on the warmest settings the fabric allows  Wash dishes and silverware in hot, soapy water or in a   · Anything you throw away needs to go into a lined trash can    When you need to empty the trash, close the open end of the lining and tie it closed  This helps prevent items the virus is on from spilling out of the trash  Remove your gloves and throw them away  Wash your hands  Follow up with your doctor as directed:  Write down your questions so you remember to ask them during your visits  For more information:   · Centers for Disease Control and Prevention  [address] , [address]  Phone: 9- 252 - 238-5643  Web Address: [employer] com br    © Copyright [address] Information is for End User's use only and may not be sold, redistributed or otherwise used for commercial purposes  All illustrations and images included in CareNotes® are the copyrighted property of A D A M , Inc  or [facility]   The above information is an  only  It is not intended as medical advice for individual conditions or treatments  Talk to your doctor, nurse or pharmacist before following any medical regimen to see if it is safe and effective for you

## 2021-05-05 ENCOUNTER — OFFICE VISIT (OUTPATIENT)
Dept: URGENT CARE | Facility: CLINIC | Age: 61
End: 2021-05-05
Payer: COMMERCIAL

## 2021-05-05 ENCOUNTER — HOSPITAL ENCOUNTER (EMERGENCY)
Facility: HOSPITAL | Age: 61
Discharge: HOME | End: 2021-05-05
Attending: EMERGENCY MEDICINE | Admitting: EMERGENCY MEDICINE
Payer: COMMERCIAL

## 2021-05-05 ENCOUNTER — APPOINTMENT (EMERGENCY)
Dept: NON INVASIVE DIAGNOSTICS | Facility: HOSPITAL | Age: 61
End: 2021-05-05
Payer: COMMERCIAL

## 2021-05-05 VITALS
WEIGHT: 195 LBS | TEMPERATURE: 97.8 F | RESPIRATION RATE: 18 BRPM | DIASTOLIC BLOOD PRESSURE: 102 MMHG | HEART RATE: 80 BPM | OXYGEN SATURATION: 94 % | HEIGHT: 67 IN | BODY MASS INDEX: 30.61 KG/M2 | SYSTOLIC BLOOD PRESSURE: 160 MMHG

## 2021-05-05 VITALS
WEIGHT: 195 LBS | TEMPERATURE: 99.2 F | HEIGHT: 67 IN | HEART RATE: 77 BPM | BODY MASS INDEX: 30.61 KG/M2 | OXYGEN SATURATION: 98 % | DIASTOLIC BLOOD PRESSURE: 92 MMHG | SYSTOLIC BLOOD PRESSURE: 142 MMHG | RESPIRATION RATE: 16 BRPM

## 2021-05-05 DIAGNOSIS — M79.89 SWELLING OF LEFT LOWER EXTREMITY: Primary | ICD-10-CM

## 2021-05-05 DIAGNOSIS — I80.00 SUPERFICIAL THROMBOPHLEBITIS OF LOWER LEG: ICD-10-CM

## 2021-05-05 DIAGNOSIS — I80.9 SUPERFICIAL THROMBOPHLEBITIS: Primary | ICD-10-CM

## 2021-05-05 DIAGNOSIS — M79.89 LEG SWELLING: ICD-10-CM

## 2021-05-05 LAB
ANION GAP SERPL CALCULATED.3IONS-SCNC: 8 MMOL/L (ref 4–13)
BUN SERPL-MCNC: 15 MG/DL (ref 5–25)
CALCIUM SERPL-MCNC: 8.5 MG/DL (ref 8.3–10.1)
CHLORIDE SERPL-SCNC: 106 MMOL/L (ref 100–108)
CO2 SERPL-SCNC: 28 MMOL/L (ref 21–32)
CREAT SERPL-MCNC: 1.08 MG/DL (ref 0.6–1.3)
GFR SERPL CREATININE-BSD FRML MDRD: 75 ML/MIN/1.73SQ M
GLUCOSE SERPL-MCNC: 89 MG/DL (ref 65–140)
POTASSIUM SERPL-SCNC: 4.1 MMOL/L (ref 3.5–5.3)
SODIUM SERPL-SCNC: 142 MMOL/L (ref 136–145)

## 2021-05-05 PROCEDURE — 99213 OFFICE O/P EST LOW 20 MIN: CPT

## 2021-05-05 PROCEDURE — 99284 EMERGENCY DEPT VISIT MOD MDM: CPT

## 2021-05-05 PROCEDURE — 80048 BASIC METABOLIC PNL TOTAL CA: CPT | Performed by: EMERGENCY MEDICINE

## 2021-05-05 PROCEDURE — G0382 LEV 3 HOSP TYPE B ED VISIT: HCPCS | Performed by: PHYSICIAN ASSISTANT

## 2021-05-05 PROCEDURE — S9083 URGENT CARE CENTER GLOBAL: HCPCS | Performed by: PHYSICIAN ASSISTANT

## 2021-05-05 PROCEDURE — 93971 EXTREMITY STUDY: CPT

## 2021-05-05 PROCEDURE — 93971 EXTREMITY STUDY: CPT | Performed by: SURGERY

## 2021-05-05 PROCEDURE — 36415 COLL VENOUS BLD VENIPUNCTURE: CPT | Performed by: EMERGENCY MEDICINE

## 2021-05-05 PROCEDURE — 99282 EMERGENCY DEPT VISIT SF MDM: CPT | Performed by: EMERGENCY MEDICINE

## 2021-05-05 NOTE — ED PROVIDER NOTES
History  Chief Complaint   Patient presents with    Leg Swelling     Patient states that he is getting pain, and swelling and redness on left calf which is getting increasingly worse since Monday       History provided by:  Patient  Leg Pain  Location:  Leg  Time since incident:  2 days  Leg location:  L leg  Pain details:     Quality:  Throbbing    Radiates to:  Does not radiate    Severity:  Moderate    Duration:  2 days    Timing:  Constant    Progression:  Unchanged  Chronicity:  New  Dislocation: no    Prior injury to area:  No  Worsened by: Activity  Associated symptoms: no fever        Prior to Admission Medications   Prescriptions Last Dose Informant Patient Reported? Taking? Loratadine (CLARITIN) 10 MG CAPS  Self Yes No   Sig: Take 1 tablet by mouth daily   Promethazine-DM (PHENERGAN-DM) 6 25-15 mg/5 mL oral syrup  Self No No   Sig: Take 5 mL by mouth 4 (four) times a day as needed for cough   Patient not taking: Reported on 5/5/2021   albuterol (PROVENTIL HFA,VENTOLIN HFA) 90 mcg/act inhaler  Self No No   Sig: Inhale 2 puffs every 6 (six) hours as needed for wheezing   Patient not taking: Reported on 5/5/2021   amLODIPine (NORVASC) 5 mg tablet  Self No No   Sig: Take 1 tablet (5 mg total) by mouth daily   benzonatate (TESSALON PERLES) 100 mg capsule   No No   Sig: Take 1 capsule (100 mg total) by mouth 3 (three) times a day as needed for cough   Patient not taking: Reported on 5/5/2021   doxycycline monohydrate (MONODOX) 100 mg capsule   No No   Sig: Take 1 capsule (100 mg total) by mouth 2 (two) times a day for 7 days   fluticasone (FLOVENT HFA) 110 MCG/ACT inhaler  Self No No   Sig: Inhale 2 puffs 2 (two) times a day Rinse mouth after use     Patient not taking: Reported on 5/5/2021   meloxicam (MOBIC) 15 mg tablet  Self No No   Sig: Take 1 tablet (15 mg total) by mouth daily   methylPREDNISolone 4 MG tablet therapy pack  Self No No   Sig: Use as directed on package   Patient not taking: Reported on 2021   mometasone (NASONEX) 50 mcg/act nasal spray  Self Yes No   Si sprays into each nostril daily   pantoprazole (PROTONIX) 40 mg tablet  Self Yes No   Sig: Take 40 mg by mouth daily      Facility-Administered Medications: None       Past Medical History:   Diagnosis Date    Arthritis     Fingers, lower back ,right hip    Chronic pain     CPAP (continuous positive airway pressure) dependence     GERD (gastroesophageal reflux disease)     Hemangioma of skin     Hypertension     Irritable bowel syndrome     Lumbar spondylolysis     Multiple pigmented nevi     Obstructive sleep apnea syndrome     PONV (postoperative nausea and vomiting)     Premature atrial beats     Premature ventricular beats     Rectus diastasis     Sleep apnea     Umbilical hernia        Past Surgical History:   Procedure Laterality Date    HERNIA REPAIR      KNEE ARTHROSCOPY Right     AK REPAIR UMBILICAL SLUZ,7+F/R,GODQM N/A 2017    Procedure: OPEN UMBILICAL HERNIA REPAIR WITH MESH;  Surgeon: Yoni Arthur MD;  Location: [facility] OR;  Service: General    WISDOM TOOTH EXTRACTION Left        Family History   Problem Relation Age of Onset    Rheum arthritis Mother     Osteoarthritis Mother     Heart disease Father     Hypertension Father     No Known Problems Sister     No Known Problems Brother      I have reviewed and agree with the history as documented  E-Cigarette/Vaping    E-Cigarette Use Never User      E-Cigarette/Vaping Substances    Nicotine No     THC No     CBD No     Flavoring No     Other No     Unknown No      Social History     Tobacco Use    Smoking status: Never Smoker    Smokeless tobacco: Never Used   Substance Use Topics    Alcohol use: Yes     Alcohol/week: 4 0 standard drinks     Types: 4 Cans of beer per week     Frequency: Monthly or less     Drinks per session: 1 or 2    Drug use: No       Review of Systems   Constitutional: Negative for fever     Respiratory: Negative for shortness of breath  Cardiovascular: Positive for leg swelling  Negative for chest pain  All other systems reviewed and are negative  Physical Exam  Physical Exam  Vitals signs and nursing note reviewed  Constitutional:       General: He is not in acute distress  Appearance: He is well-developed  HENT:      Head: Normocephalic and atraumatic  Right Ear: External ear normal       Left Ear: External ear normal       Nose: Nose normal    Eyes:      General: No scleral icterus  Neck:      Musculoskeletal: Normal range of motion and neck supple  Pulmonary:      Effort: Pulmonary effort is normal  No respiratory distress  Abdominal:      General: There is no distension  Palpations: Abdomen is soft  Musculoskeletal: Normal range of motion  General: Swelling and tenderness present  No deformity  Comments: Left popliteal, prox calf with mild erythema, swelling and tender to palpation  Intact distal pulses  Normal ROM  Skin:     General: Skin is warm  Findings: No rash  Neurological:      General: No focal deficit present  Mental Status: He is alert        Gait: Gait normal    Psychiatric:         Mood and Affect: Mood normal          Vital Signs  ED Triage Vitals   Temperature Pulse Respirations Blood Pressure SpO2   05/05/21 1627 05/05/21 1628 05/05/21 1628 05/05/21 1628 05/05/21 1628   97 8 °F (36 6 °C) 80 18 (!) 160/102 94 %      Temp Source Heart Rate Source Patient Position - Orthostatic VS BP Location FiO2 (%)   05/05/21 1627 -- 05/05/21 1628 05/05/21 1628 --   Temporal  Lying Left arm       Pain Score       --                  Vitals:    05/05/21 1628   BP: (!) 160/102   Pulse: 80   Patient Position - Orthostatic VS: Lying         Visual Acuity      ED Medications  Medications - No data to display    Diagnostic Studies  Results Reviewed     Procedure Component Value Units Date/Time    Basic metabolic panel [[id number]] Collected: 05/05/21 3166 Lab Status: Final result Specimen: Blood from Arm, Right Updated: 05/05/21 1725     Sodium 142 mmol/L      Potassium 4 1 mmol/L      Chloride 106 mmol/L      CO2 28 mmol/L      ANION GAP 8 mmol/L      BUN 15 mg/dL      Creatinine 1 08 mg/dL      Glucose 89 mg/dL      Calcium 8 5 mg/dL      eGFR 75 ml/min/1 73sq m     Narrative:      [place] guidelines for Chronic Kidney Disease (CKD):     Stage 1 with normal or high GFR (GFR > 90 mL/min/1 73 square meters)    Stage 2 Mild CKD (GFR = 60-89 mL/min/1 73 square meters)    Stage 3A Moderate CKD (GFR = 45-59 mL/min/1 73 square meters)    Stage 3B Moderate CKD (GFR = 30-44 mL/min/1 73 square meters)    Stage 4 Severe CKD (GFR = 15-29 mL/min/1 73 square meters)    Stage 5 End Stage CKD (GFR <15 mL/min/1 73 square meters)  Note: GFR calculation is accurate only with a steady state creatinine                 VAS lower limb venous duplex study, unilateral/limited    (Results Pending)              Procedures  Procedures         ED Course                             SBIRT 20yo+      Most Recent Value   SBIRT (22 yo +)   In order to provide better care to our patients, we are screening all of our patients for alcohol and drug use  Would it be okay to ask you these screening questions? No Filed at: 05/05/2021 1701                    MDM  Number of Diagnoses or Management Options  Leg swelling: new and requires workup  Superficial thrombophlebitis:   Diagnosis management comments: 64 yom with calf swelling and pain x2 days  Obtain duplex, BMP  Discussed results with patient  Risks versus benefit of anticoagulation  Strict return precautions discussed    Follow up with primary care provider in week for recheck duplex to assess for propagation       Amount and/or Complexity of Data Reviewed  Clinical lab tests: ordered and reviewed  Tests in the radiology section of CPT®: ordered and reviewed  Review and summarize past medical records: yes        Disposition  Final diagnoses:   Leg swelling   Superficial thrombophlebitis     Time reflects when diagnosis was documented in both MDM as applicable and the Disposition within this note     Time User Action Codes Description Comment    5/5/2021  5:13 PM Red Kussmaul Add [C98 84] Leg swelling     5/5/2021  6:06 PM Red Kussmaul Add [I80 9] Superficial thrombophlebitis     5/5/2021  6:06 PM Jael Kill [N94 02] Leg swelling     5/5/2021  6:06 PM Red Kussmaul Modify [I80 9] Superficial thrombophlebitis     5/5/2021  6:06 PM Laurent Guzman Add [I80 00] Superficial thrombophlebitis of lower leg       ED Disposition     ED Disposition Condition Date/Time Comment    Discharge Stable Wed May 5, 2021  6:06  [address] discharge to home/self care              Follow-up Information     Follow up With Specialties Details Why Contact Info Additional Information    Naz Gonzales,  Family Medicine In 1 week For recheck and propagation of superficial thrombophlebitis [facility]  [phone]        [address] Emergency Department Emergency Medicine  If symptoms worsen [address] [address]  [address] [facility] Emergency Department, [address] [place], [place], [facility] [address]          Discharge Medication List as of 5/5/2021  6:07 PM      CONTINUE these medications which have NOT CHANGED    Details   albuterol (PROVENTIL HFA,VENTOLIN HFA) 90 mcg/act inhaler Inhale 2 puffs every 6 (six) hours as needed for wheezing, Starting Tue 4/27/2021, Until Thu 5/27/2021, Normal      amLODIPine (NORVASC) 5 mg tablet Take 1 tablet (5 mg total) by mouth daily, Starting Fri 2/19/2021, Normal      benzonatate (TESSALON PERLES) 100 mg capsule Take 1 capsule (100 mg total) by mouth 3 (three) times a day as needed for cough, Starting Fri 4/30/2021, Normal      doxycycline monohydrate (MONODOX) 100 mg capsule Take 1 capsule (100 mg total) by mouth 2 (two) times a day for 7 days, Starting Fri 4/30/2021, Until Fri 5/7/2021, Normal      fluticasone (FLOVENT HFA) 110 MCG/ACT inhaler Inhale 2 puffs 2 (two) times a day Rinse mouth after use , Starting Wed 4/28/2021, Normal      Loratadine (CLARITIN) 10 MG CAPS Take 1 tablet by mouth daily, Starting Thu 10/5/2017, Historical Med      meloxicam (MOBIC) 15 mg tablet Take 1 tablet (15 mg total) by mouth daily, Starting Mon 2/8/2021, Normal      methylPREDNISolone 4 MG tablet therapy pack Use as directed on package, Normal      mometasone (NASONEX) 50 mcg/act nasal spray 2 sprays into each nostril daily, Historical Med      pantoprazole (PROTONIX) 40 mg tablet Take 40 mg by mouth daily, Historical Med      Promethazine-DM (PHENERGAN-DM) 6 25-15 mg/5 mL oral syrup Take 5 mL by mouth 4 (four) times a day as needed for cough, Starting Tue 4/27/2021, Normal           No discharge procedures on file      PDMP Review     None          ED Provider  Electronically Signed by           Lucretia Grayson DO  05/05/21 1296

## 2021-05-05 NOTE — PROGRESS NOTES
[facility]        NAME: Garry Ureña is a 64 y o  male  : 1960    MRN: [id number]  DATE: May 5, 2021  TIME: 4:08 PM    Assessment and Plan   Swelling of left lower extremity [M79 89]  1  Swelling of left lower extremity  Transfer to other facility     COVID positive last week, ended quarantine 2 days ago  Pt has 2 day hx atraumatic tenderness and swelling to posterior L calf  Pt I concerned for DVT  Beto sign (-)  I offered to have pt f/u with PCP tomorrow or be evaluated in ED today  He states he would like to go to ED today  Patient Instructions       Go to ER for further evaluation    Chief Complaint     Chief Complaint   Patient presents with    Leg Pain     Pt reports a red painful area on his left calf that began on Monday  History of Present Illness       Leg Pain   Incident onset: Slowly worsening over past 2 days  The incident occurred at home  There was no injury mechanism  Pain location: L calf extending behind L knee  The pain is moderate  The pain has been constant since onset  Pertinent negatives include no inability to bear weight, loss of sensation, muscle weakness, numbness or tingling  He reports no foreign bodies present  The symptoms are aggravated by palpation and movement  He has tried nothing for the symptoms  Pt has PMH bakers cyst and states this feels very similar  Pt was COVID positive last week and ended quarantine 2 days ago  Pt is concerned for DVT due to COVID  Review of Systems   Review of Systems   Constitutional: Negative for chills, diaphoresis, fatigue and fever  HENT: Negative for congestion, ear pain, postnasal drip, rhinorrhea, sinus pressure, sinus pain, sneezing, sore throat and trouble swallowing  Eyes: Negative  Respiratory: Negative for cough, chest tightness, shortness of breath and wheezing  Cardiovascular: Positive for leg swelling  Negative for chest pain and palpitations     Gastrointestinal: Negative for abdominal pain, diarrhea, nausea and vomiting  Endocrine: Negative  Genitourinary: Negative for dysuria  Musculoskeletal: Negative  Negative for back pain and neck pain  Skin: Negative for pallor and rash  Allergic/Immunologic: Negative  Neurological: Negative  Negative for tingling, light-headedness, numbness and headaches  Hematological: Negative  Psychiatric/Behavioral: Negative  Current Medications       Current Outpatient Medications:     amLODIPine (NORVASC) 5 mg tablet, Take 1 tablet (5 mg total) by mouth daily, Disp: 90 tablet, Rfl: 1    doxycycline monohydrate (MONODOX) 100 mg capsule, Take 1 capsule (100 mg total) by mouth 2 (two) times a day for 7 days, Disp: 14 capsule, Rfl: 0    meloxicam (MOBIC) 15 mg tablet, Take 1 tablet (15 mg total) by mouth daily, Disp: 30 tablet, Rfl: 1    mometasone (NASONEX) 50 mcg/act nasal spray, 2 sprays into each nostril daily, Disp: , Rfl:     pantoprazole (PROTONIX) 40 mg tablet, Take 40 mg by mouth daily, Disp: , Rfl:     albuterol (PROVENTIL HFA,VENTOLIN HFA) 90 mcg/act inhaler, Inhale 2 puffs every 6 (six) hours as needed for wheezing (Patient not taking: Reported on 5/5/2021), Disp: 1 Inhaler, Rfl: 0    benzonatate (TESSALON PERLES) 100 mg capsule, Take 1 capsule (100 mg total) by mouth 3 (three) times a day as needed for cough (Patient not taking: Reported on 5/5/2021), Disp: 20 capsule, Rfl: 0    fluticasone (FLOVENT HFA) 110 MCG/ACT inhaler, Inhale 2 puffs 2 (two) times a day Rinse mouth after use   (Patient not taking: Reported on 5/5/2021), Disp: 1 Inhaler, Rfl: 0    Loratadine (CLARITIN) 10 MG CAPS, Take 1 tablet by mouth daily, Disp: , Rfl:     methylPREDNISolone 4 MG tablet therapy pack, Use as directed on package (Patient not taking: Reported on 5/5/2021), Disp: 21 each, Rfl: 0    Promethazine-DM (PHENERGAN-DM) 6 25-15 mg/5 mL oral syrup, Take 5 mL by mouth 4 (four) times a day as needed for cough (Patient not taking: Reported on 5/5/2021), Disp: 118 mL, Rfl: 1    Current Allergies     Allergies as of 05/05/2021 - Reviewed 05/05/2021   Allergen Reaction Noted    Lisinopril Swelling     Ibuprofen Rash 12/04/2017    Penicillins  11/27/2016            The following portions of the patient's history were reviewed and updated as appropriate: allergies, current medications, past family history, past medical history, past social history, past surgical history and problem list      Past Medical History:   Diagnosis Date    Arthritis     Fingers, lower back ,right hip    Chronic pain     CPAP (continuous positive airway pressure) dependence     GERD (gastroesophageal reflux disease)     Hemangioma of skin     Hypertension     Irritable bowel syndrome     Lumbar spondylolysis     Multiple pigmented nevi     Obstructive sleep apnea syndrome     PONV (postoperative nausea and vomiting)     Premature atrial beats     Premature ventricular beats     Rectus diastasis     Sleep apnea     Umbilical hernia        Past Surgical History:   Procedure Laterality Date    HERNIA REPAIR      KNEE ARTHROSCOPY Right     MT REPAIR UMBILICAL NEOD,7+V/V,UFOXD N/A 12/12/2017    Procedure: OPEN UMBILICAL HERNIA REPAIR WITH MESH;  Surgeon: Vandana Chicas MD;  [facility] OR;  Service: General    WISDOM TOOTH EXTRACTION Left        Family History   Problem Relation Age of Onset    Rheum arthritis Mother     Osteoarthritis Mother     Heart disease Father     Hypertension Father     No Known Problems Sister     No Known Problems Brother          Medications have been verified  Objective   /92   Pulse 77   Temp 99 2 °F (37 3 °C)   Resp 16   Ht 5' 7" (1 702 m)   Wt 88 5 kg (195 lb)   SpO2 98%   BMI 30 54 kg/m²        Physical Exam     Physical Exam  Vitals signs and nursing note reviewed  Constitutional:       General: He is not in acute distress  Appearance: Normal appearance  He is well-developed   He is not ill-appearing or diaphoretic  HENT:      Head: Normocephalic and atraumatic  Neck:      Musculoskeletal: Normal range of motion and neck supple  Cardiovascular:      Rate and Rhythm: Normal rate and regular rhythm  Heart sounds: Normal heart sounds  Pulmonary:      Effort: Pulmonary effort is normal  No respiratory distress  Breath sounds: Normal breath sounds  No wheezing, rhonchi or rales  Musculoskeletal:        Legs:    Lymphadenopathy:      Cervical: No cervical adenopathy  Skin:     General: Skin is warm  Capillary Refill: Capillary refill takes less than 2 seconds  Coloration: Skin is not pale  Findings: No rash  Neurological:      Mental Status: He is alert

## 2021-05-07 ENCOUNTER — RA CDI HCC (OUTPATIENT)
Dept: OTHER | Facility: HOSPITAL | Age: 61
End: 2021-05-07

## 2021-05-07 NOTE — PROGRESS NOTES
[facility]  coding opportunities          Chart reviewed, no opportunity found: CHART REVIEWED, NO OPPORTUNITY FOUND              Patients insurance company: [employer] (Medicare Advantage and Commercial)

## 2021-05-10 ENCOUNTER — TELEPHONE (OUTPATIENT)
Dept: FAMILY MEDICINE CLINIC | Facility: CLINIC | Age: 61
End: 2021-05-10

## 2021-05-10 DIAGNOSIS — I80.02 THROMBOPHLEBITIS OF SUPERFICIAL VEINS OF LEFT LOWER EXTREMITY: Primary | ICD-10-CM

## 2021-05-10 NOTE — TELEPHONE ENCOUNTER
MEGAN had a blood clot in his left leg and he has a follow up with you later in the week and he was told by the care now doctor to have a f/u U/S before he sees you so he is requesting a script be put into the system so he can get that done before he comes to see you please    [phone]

## 2021-05-10 NOTE — TELEPHONE ENCOUNTER
Please let the patient know that the orders currently in the system for him so he can call to schedule the follow-up venous ultrasound

## 2021-05-11 ENCOUNTER — HOSPITAL ENCOUNTER (OUTPATIENT)
Dept: NON INVASIVE DIAGNOSTICS | Facility: CLINIC | Age: 61
Discharge: HOME | End: 2021-05-11
Payer: COMMERCIAL

## 2021-05-11 DIAGNOSIS — I80.02 THROMBOPHLEBITIS OF SUPERFICIAL VEINS OF LEFT LOWER EXTREMITY: ICD-10-CM

## 2021-05-11 PROCEDURE — 93971 EXTREMITY STUDY: CPT | Performed by: SURGERY

## 2021-05-11 PROCEDURE — 93971 EXTREMITY STUDY: CPT

## 2021-05-13 ENCOUNTER — OFFICE VISIT (OUTPATIENT)
Dept: FAMILY MEDICINE CLINIC | Facility: CLINIC | Age: 61
End: 2021-05-13
Payer: COMMERCIAL

## 2021-05-13 VITALS
BODY MASS INDEX: 31.17 KG/M2 | WEIGHT: 198.6 LBS | HEART RATE: 68 BPM | RESPIRATION RATE: 20 BRPM | OXYGEN SATURATION: 97 % | SYSTOLIC BLOOD PRESSURE: 124 MMHG | DIASTOLIC BLOOD PRESSURE: 84 MMHG | TEMPERATURE: 97.6 F | HEIGHT: 67 IN

## 2021-05-13 DIAGNOSIS — M19.042 PRIMARY OSTEOARTHRITIS OF BOTH HANDS: ICD-10-CM

## 2021-05-13 DIAGNOSIS — M19.041 PRIMARY OSTEOARTHRITIS OF BOTH HANDS: ICD-10-CM

## 2021-05-13 DIAGNOSIS — I80.02 THROMBOPHLEBITIS OF SUPERFICIAL VEINS OF LEFT LOWER EXTREMITY: Primary | ICD-10-CM

## 2021-05-13 DIAGNOSIS — I10 ESSENTIAL HYPERTENSION: ICD-10-CM

## 2021-05-13 PROCEDURE — 99214 OFFICE O/P EST MOD 30 MIN: CPT | Performed by: FAMILY MEDICINE

## 2021-05-13 RX ORDER — MELOXICAM 15 MG/1
15 TABLET ORAL DAILY
Qty: 90 TABLET | Refills: 1 | Status: SHIPPED | OUTPATIENT
Start: 2021-05-13 | End: 2021-11-30 | Stop reason: ALTCHOICE

## 2021-05-13 RX ORDER — AMLODIPINE BESYLATE 5 MG/1
5 TABLET ORAL DAILY
Qty: 90 TABLET | Refills: 1 | Status: SHIPPED | OUTPATIENT
Start: 2021-05-13 | End: 2021-07-01 | Stop reason: SDUPTHER

## 2021-05-13 NOTE — PROGRESS NOTES
Assessment/Plan:  Problem List Items Addressed This Visit        Cardiovascular and Mediastinum    Essential hypertension    Relevant Medications    amLODIPine (NORVASC) 5 mg tablet    Thrombophlebitis of superficial veins of left lower extremity - Primary     The patient is doing much better since his emergency room visit  He has no pain and the swelling has gone down  The ultrasound demonstrated no propagation of the clot  We will continue with anti-inflammatories, elevation, and rest   We will repeat the venous duplex in a week as ordered and will follow up with the results  Will see him back as scheduled  Relevant Orders    VAS lower limb venous duplex study, unilateral/limited       Musculoskeletal and Integument    Primary osteoarthritis of both hands    Relevant Medications    meloxicam (MOBIC) 15 mg tablet          Return for Next scheduled follow up  Subjective:   Chief Complaint   Patient presents with    Follow-up     from [address] [facility] on left leg        Patient ID: Sherron Conroy is a 63 y o  male presents today for for follow-up her recent ER visit on 5/5/2021 for superficial thrombophlebitis of the left lower extremity     Sherron Conroy is a 64 y o  male who presents today for follow-up of left superficial thrombophlebitis  He started with pain and swelling in the left leg on 5/02/2021 and he developed increased pain and swelling and red streaks- went to Urgent Care and then the hospital - seen in ER  He underwent a left lower extremity venous duplex which demonstrated superficial thrombophlebitis  He was discharged home in stable condition and was advised to continue with his anti-inflammatory along with elevation and heat  The pain, swelling, and redness went down  The patient denies any chest pain, shortness of breath, or palpitations  There is no edema  There are no headaches or visual changes  There is no lightheadedness, dizziness, or fainting spells    States that his leg is actually feeling a lot better  The swelling has gone down significantly and he has little pain  There is no limitation in his range of motion          The following portions of the patient's history were reviewed and updated as appropriate: allergies, current medications, past family history, past medical history, past social history, past surgical history and problem list   Patient Active Problem List   Diagnosis    Essential hypertension    ZIA (obstructive sleep apnea)    Insufficient sleep syndrome    Hypersomnia    Palpitations    Allergic rhinitis    Chronic bilateral low back pain with bilateral sciatica    Chronic myofascial pain    Esophageal reflux    Hemangioma of skin    Displacement of lumbar intervertebral disc without myelopathy    Lumbar spondylolysis    Multiple pigmented nevi    Primary localized osteoarthritis of right hip    Primary localized osteoarthritis of right knee    Rectus diastasis    Chronic pansinusitis    Arthritis of right hip    Lumbar radiculopathy    Squamous cell skin cancer- scalp    Primary osteoarthritis of both hands    Thrombophlebitis of superficial veins of left lower extremity     Past Medical History:   Diagnosis Date    Arthritis     Fingers, lower back ,right hip    Chronic pain     CPAP (continuous positive airway pressure) dependence     GERD (gastroesophageal reflux disease)     Hemangioma of skin     Hypertension     Irritable bowel syndrome     Lumbar spondylolysis     Multiple pigmented nevi     Obstructive sleep apnea syndrome     PONV (postoperative nausea and vomiting)     Premature atrial beats     Premature ventricular beats     Rectus diastasis     Sleep apnea     Umbilical hernia      Past Surgical History:   Procedure Laterality Date    HERNIA REPAIR      KNEE ARTHROSCOPY Right     [place] REPAIR UMBILICAL MZSH,1+M/P,CFDTE N/A 12/12/2017    Procedure: OPEN UMBILICAL HERNIA REPAIR WITH MESH;  Surgeon: Lita Runner Qiana Roper MD;  Location:  MAIN OR;  Service: General    WISDOM TOOTH EXTRACTION Left      Allergies   Allergen Reactions    Lisinopril Swelling    Ibuprofen Rash    Penicillins Rash     Other reaction(s): Rash     Family History   Problem Relation Age of Onset    Rheum arthritis Mother     Osteoarthritis Mother     Heart disease Father     Hypertension Father     No Known Problems Sister     No Known Problems Brother      Social History     Socioeconomic History    Marital status: /Civil Union     Spouse name: Not on file    Number of children: Not on file    Years of education: Not on file    Highest education level: Not on file   Occupational History    Not on file   Social Needs    Financial resource strain: Not hard at all   [employer] insecurity     Worry: Never true     Inability: Never true   [employer] needs     Medical: No     Non-medical: No   Tobacco Use    Smoking status: Never Smoker    Smokeless tobacco: Never Used   Substance and Sexual Activity    Alcohol use:  Yes     Alcohol/week: 4 0 standard drinks     Types: 4 Cans of beer per week     Frequency: Monthly or less     Drinks per session: 1 or 2    Drug use: No    Sexual activity: Yes     Partners: Female     Birth control/protection: None   Lifestyle    Physical activity     Days per week: 3 days     Minutes per session: 60 min    Stress: Not at all   Relationships    Social connections     Talks on phone: More than three times a week     Gets together: Once a week     Attends [employer] service: More than 4 times per year     Active member of club or organization: Yes     Attends meetings of clubs or organizations: More than 4 times per year     Relationship status:     Intimate partner violence     Fear of current or ex partner: No     Emotionally abused: No     Physically abused: No     Forced sexual activity: No   Other Topics Concern    Not on file   Social History Narrative    Not on file Current Outpatient Medications on File Prior to Visit   Medication Sig Dispense Refill    pantoprazole (PROTONIX) 40 mg tablet Take 40 mg by mouth daily       No current facility-administered medications on file prior to visit  Review of Systems   Constitutional: Negative  HENT: Negative  Eyes: Negative  Respiratory: Negative  Cardiovascular: Negative  Gastrointestinal: Negative  Endocrine: Negative  Genitourinary: Negative  Musculoskeletal: Negative  Skin: Negative  Allergic/Immunologic: Negative  Neurological: Negative  Hematological: Negative  Psychiatric/Behavioral: Negative  Objective:  Vitals:    05/13/21 1503 05/13/21 1550   BP: 132/98 124/84   BP Location: Left arm    Patient Position: Sitting    Cuff Size: Standard    Pulse: 76 68   Resp: 20    Temp: 97 6 °F (36 4 °C)    TempSrc: Tympanic    SpO2: 97%    Weight: 90 1 kg (198 lb 9 6 oz)    Height: 5' 7" (1 702 m)      Body mass index is 31 11 kg/m²  Physical Exam  Vitals signs and nursing note reviewed  Constitutional:       General: He is not in acute distress  Appearance: He is well-developed  He is not diaphoretic  Eyes:      Pupils: Pupils are equal, round, and reactive to light  Neck:      Musculoskeletal: Normal range of motion and neck supple  Thyroid: No thyromegaly  Vascular: No JVD  Trachea: No tracheal deviation  Cardiovascular:      Rate and Rhythm: Normal rate and regular rhythm  Heart sounds: Normal heart sounds  No murmur  No friction rub  No gallop  Pulmonary:      Effort: Pulmonary effort is normal  No respiratory distress  Breath sounds: Normal breath sounds  No stridor  No wheezing or rales  Chest:      Chest wall: No tenderness  Abdominal:      General: Bowel sounds are normal  There is no distension  Palpations: Abdomen is soft  There is no mass  Tenderness: There is no abdominal tenderness  There is no guarding or rebound  Musculoskeletal: Normal range of motion  Lymphadenopathy:      Cervical: No cervical adenopathy  Skin:     General: Skin is warm and dry  Coloration: Skin is not pale  Findings: No erythema or rash  Neurological:      Mental Status: He is alert and oriented to person, place, and time  Cranial Nerves: No cranial nerve deficit  Motor: No abnormal muscle tone  Coordination: Coordination normal       Deep Tendon Reflexes: Reflexes are normal and symmetric  Reflexes normal             BMI Counseling: Body mass index is 31 11 kg/m²  The BMI is above normal  Nutrition recommendations include decreasing portion sizes, encouraging healthy choices of fruits and vegetables, decreasing fast food intake, consuming healthier snacks, limiting drinks that contain sugar, moderation in carbohydrate intake, increasing intake of lean protein, reducing intake of saturated and trans fat and reducing intake of cholesterol  Exercise recommendations include exercising 3-5 times per week and strength training exercises  No pharmacotherapy was ordered

## 2021-05-17 PROBLEM — I80.02 THROMBOPHLEBITIS OF SUPERFICIAL VEINS OF LEFT LOWER EXTREMITY: Status: ACTIVE | Noted: 2021-05-17

## 2021-05-17 NOTE — ASSESSMENT & PLAN NOTE
The patient is doing much better since his emergency room visit  He has no pain and the swelling has gone down  The ultrasound demonstrated no propagation of the clot  We will continue with anti-inflammatories, elevation, and rest   We will repeat the venous duplex in a week as ordered and will follow up with the results  Will see him back as scheduled

## 2021-05-19 ENCOUNTER — HOSPITAL ENCOUNTER (OUTPATIENT)
Dept: NON INVASIVE DIAGNOSTICS | Facility: HOSPITAL | Age: 61
Discharge: HOME | End: 2021-05-19
Payer: COMMERCIAL

## 2021-05-19 DIAGNOSIS — I80.02 THROMBOPHLEBITIS OF SUPERFICIAL VEINS OF LEFT LOWER EXTREMITY: ICD-10-CM

## 2021-05-19 PROCEDURE — 93971 EXTREMITY STUDY: CPT

## 2021-05-19 PROCEDURE — 93971 EXTREMITY STUDY: CPT | Performed by: SURGERY

## 2021-05-25 ENCOUNTER — HOSPITAL ENCOUNTER (EMERGENCY)
Facility: HOSPITAL | Age: 61
Discharge: HOME | End: 2021-05-25
Attending: EMERGENCY MEDICINE | Admitting: EMERGENCY MEDICINE
Payer: COMMERCIAL

## 2021-05-25 ENCOUNTER — APPOINTMENT (EMERGENCY)
Dept: RADIOLOGY | Facility: HOSPITAL | Age: 61
End: 2021-05-25
Payer: COMMERCIAL

## 2021-05-25 VITALS
TEMPERATURE: 98.4 F | DIASTOLIC BLOOD PRESSURE: 95 MMHG | RESPIRATION RATE: 18 BRPM | OXYGEN SATURATION: 95 % | HEART RATE: 72 BPM | SYSTOLIC BLOOD PRESSURE: 151 MMHG

## 2021-05-25 DIAGNOSIS — S80.812A ABRASION OF LEFT LOWER LEG: ICD-10-CM

## 2021-05-25 DIAGNOSIS — S80.12XA CONTUSION OF LEFT LOWER LEG: Primary | ICD-10-CM

## 2021-05-25 PROCEDURE — 99284 EMERGENCY DEPT VISIT MOD MDM: CPT | Performed by: PHYSICIAN ASSISTANT

## 2021-05-25 PROCEDURE — 73590 X-RAY EXAM OF LOWER LEG: CPT

## 2021-05-25 PROCEDURE — 99283 EMERGENCY DEPT VISIT LOW MDM: CPT

## 2021-05-25 NOTE — ED PROVIDER NOTES
History  Chief Complaint   Patient presents with    Leg Injury     pt reports hitting his left shin with the car door  currently has a blood clot in the back of the leg that he was diagnosed with last week  states that his leg "blew up" and was very painful  has gone down in size since applyingh ice      Patient is a 62 y/o M with h/o GERD, IBS that presents to the ED with left leg pain  He states he hit his leg on the car door this afternoon  He states he cleaned it and put a bandaid on the abrasion  He went to play golf and the swelling in his leg got worse  He states he has superficial thrombophlebitis to left leg  He is not on a blood thinner  Last tetanus was in 2019  Patient concerned because he said the swelling was pretty bad, but now improved  History provided by:  Patient  Leg Pain  Location:  Leg  Time since incident:  3 hours  Injury: yes    Mechanism of injury comment:  Hit leg on car door  Leg location:  L lower leg  Pain details:     Quality:  Aching    Radiates to:  Does not radiate    Severity:  Moderate    Onset quality:  Sudden    Timing:  Constant    Progression:  Improving  Chronicity:  New  Prior injury to area:  No  Relieved by: Ice  Exacerbated by: palpation  Ineffective treatments:  None tried  Associated symptoms: swelling    Associated symptoms: no decreased ROM, no fever and no numbness        Prior to Admission Medications   Prescriptions Last Dose Informant Patient Reported? Taking?    amLODIPine (NORVASC) 5 mg tablet   No No   Sig: Take 1 tablet (5 mg total) by mouth daily   meloxicam (MOBIC) 15 mg tablet   No No   Sig: Take 1 tablet (15 mg total) by mouth daily   pantoprazole (PROTONIX) 40 mg tablet  Self Yes No   Sig: Take 40 mg by mouth daily      Facility-Administered Medications: None       Past Medical History:   Diagnosis Date    Arthritis     Fingers, lower back ,right hip    Chronic pain     CPAP (continuous positive airway pressure) dependence     GERD (gastroesophageal reflux disease)     Hemangioma of skin     Hypertension     Irritable bowel syndrome     Lumbar spondylolysis     Multiple pigmented nevi     Obstructive sleep apnea syndrome     PONV (postoperative nausea and vomiting)     Premature atrial beats     Premature ventricular beats     Rectus diastasis     Sleep apnea     Umbilical hernia        Past Surgical History:   Procedure Laterality Date    HERNIA REPAIR      KNEE ARTHROSCOPY Right     [place] REPAIR UMBILICAL CWRK,4+P/K,ZCLPC N/A 12/12/2017    Procedure: OPEN UMBILICAL HERNIA REPAIR WITH MESH;  Surgeon: Brenda Senior MD;  Location:  MAIN OR;  Service: General    WISDOM TOOTH EXTRACTION Left        Family History   Problem Relation Age of Onset    Rheum arthritis Mother     Osteoarthritis Mother     Heart disease Father     Hypertension Father     No Known Problems Sister     No Known Problems Brother      I have reviewed and agree with the history as documented  E-Cigarette/Vaping    E-Cigarette Use Never User      E-Cigarette/Vaping Substances    Nicotine No     THC No     CBD No     Flavoring No     Other No     Unknown No      Social History     Tobacco Use    Smoking status: Never Smoker    Smokeless tobacco: Never Used   Substance Use Topics    Alcohol use: Yes     Alcohol/week: 4 0 standard drinks     Types: 4 Cans of beer per week     Frequency: Monthly or less     Drinks per session: 1 or 2    Drug use: No       Review of Systems   Constitutional: Negative for chills and fever  Cardiovascular: Positive for leg swelling  Negative for chest pain  Musculoskeletal:        Left lower leg pain   Skin: Positive for wound  Neurological: Negative for dizziness, weakness and numbness  All other systems reviewed and are negative  Physical Exam  Physical Exam  Vitals signs and nursing note reviewed  Constitutional:       General: He is not in acute distress  Appearance: Normal appearance   He is well-developed and well-groomed  He is not ill-appearing or diaphoretic  HENT:      Head: Normocephalic and atraumatic  Right Ear: External ear normal       Left Ear: External ear normal       Nose: Nose normal    Eyes:      Conjunctiva/sclera: Conjunctivae normal    Neck:      Musculoskeletal: Normal range of motion  Cardiovascular:      Rate and Rhythm: Normal rate and regular rhythm  Pulses:           Dorsalis pedis pulses are 2+ on the left side  Heart sounds: Normal heart sounds  Pulmonary:      Effort: Pulmonary effort is normal       Breath sounds: Normal breath sounds  No wheezing, rhonchi or rales  Musculoskeletal:      Left lower leg: He exhibits tenderness, bony tenderness and swelling  He exhibits no deformity  Legs:       Comments: Patient has h/o superficial thrombophlebitis left leg  No erythema or warmth  Skin:     General: Skin is warm and dry  Findings: Abrasion (superficial clean abrasion left lower leg  ) present  No bruising, ecchymosis or erythema  Neurological:      Mental Status: He is alert and oriented to person, place, and time  Sensory: Sensation is intact  Motor: Motor function is intact  Gait: Gait is intact  Psychiatric:         Mood and Affect: Mood normal          Speech: Speech normal          Behavior: Behavior is cooperative           Vital Signs  ED Triage Vitals [05/25/21 1650]   Temperature Pulse Respirations Blood Pressure SpO2   98 4 °F (36 9 °C) 72 18 151/95 95 %      Temp Source Heart Rate Source Patient Position - Orthostatic VS BP Location FiO2 (%)   Temporal Monitor Lying Right arm --      Pain Score       --           Vitals:    05/25/21 1650   BP: 151/95   Pulse: 72   Patient Position - Orthostatic VS: Lying         Visual Acuity      ED Medications  Medications - No data to display    Diagnostic Studies  Results Reviewed     None                 XR tibia fibula 2 views LEFT   ED Interpretation by Jacqueline Chang Ester Jones PA-C (05/25 1718)   No acute fracture  Procedures  Orthopedic injury treatment    Date/Time: 5/25/2021 5:30 PM  Performed by: Claudene Keener, PA-C  Authorized by: Claudene Keener, PA-C     Patient Location:  ED  Other Assisting Provider: No    Risks and benefits: Risks, benefits and alternatives were discussed    Consent given by:  Patient  Injury location:  Lower leg  Location details:  Left lower leg  Injury type: Soft tissue  Neurovascular status: Neurovascularly intact    Immobilization:  Ace wrap  Supplies used:  Elastic bandage  Neurovascular status: Neurovascularly intact    Distal perfusion: normal    Neurological function: normal    Range of motion: normal    Patient tolerance:  Patient tolerated the procedure well with no immediate complications             ED Course                             SBIRT 20yo+      Most Recent Value   SBIRT (24 yo +)   In order to provide better care to our patients, we are screening all of our patients for alcohol and drug use  Would it be okay to ask you these screening questions? Yes Filed at: 05/25/2021 1740   Initial Alcohol Screen: US AUDIT-C    1  How often do you have a drink containing alcohol?  0 Filed at: 05/25/2021 1740   2  How many drinks containing alcohol do you have on a typical day you are drinking? 0 Filed at: 05/25/2021 1740   3a  Male UNDER 65: How often do you have five or more drinks on one occasion? 0 Filed at: 05/25/2021 1740   3b  FEMALE Any Age, or MALE 65+: How often do you have 4 or more drinks on one occassion? 0 Filed at: 05/25/2021 1740   Audit-C Score  0 Filed at: 05/25/2021 1740   ANDRES: How many times in the past year have you    Used an illegal drug or used a prescription medication for non-medical reasons?   Never Filed at: 05/25/2021 1740                    MDM  Number of Diagnoses or Management Options  Abrasion of left lower leg: minor  Contusion of left lower leg: new and requires workup  Diagnosis management comments: Patient with pain to left leg after hitting it on car door, will xray to r/o fracture  No concern for blood clot since injury just occurred a couple hours ago  ADvised rest, ice, elevated compression and f/u with PCP for recheck  Amount and/or Complexity of Data Reviewed  Tests in the radiology section of CPT®: ordered and reviewed  Independent visualization of images, tracings, or specimens: yes    Patient Progress  Patient progress: stable      Disposition  Final diagnoses:   Contusion of left lower leg   Abrasion of left lower leg     Time reflects when diagnosis was documented in both MDM as applicable and the Disposition within this note     Time User Action Codes Description Comment    5/25/2021  5:32 PM Kiera Polanco Add [S80 12XA] Contusion of left lower leg     5/25/2021  5:32 PM Kiera Polanco Add [S48 104O] Abrasion of left lower leg       ED Disposition     ED Disposition Condition Date/Time Comment    Discharge Stable Tue May 25, 2021  5:32 PM Nay Cordova discharge to home/self care  Follow-up Information     Follow up With Specialties Details Why Contact Info    Rj Dailey DO Family Medicine Call in 1 week For recheck [address] [facility]  [phone]            Discharge Medication List as of 5/25/2021  5:33 PM      CONTINUE these medications which have NOT CHANGED    Details   amLODIPine (NORVASC) 5 mg tablet Take 1 tablet (5 mg total) by mouth daily, Starting Thu 5/13/2021, Normal      meloxicam (MOBIC) 15 mg tablet Take 1 tablet (15 mg total) by mouth daily, Starting Thu 5/13/2021, Normal      pantoprazole (PROTONIX) 40 mg tablet Take 40 mg by mouth daily, Historical Med           No discharge procedures on file      PDMP Review     None          ED Provider  Electronically Signed by           Junito Morales PA-C  05/25/21 [id number]

## 2021-05-25 NOTE — DISCHARGE INSTRUCTIONS
Rest, ice, elevate leg  Tylenol/motrin for discomfort  Clean abrasion daily  Follow up with family doctor in 1 week for recheck and repeat vascular study, or sooner if symptoms worsen

## 2021-05-27 ENCOUNTER — OFFICE VISIT (OUTPATIENT)
Dept: FAMILY MEDICINE CLINIC | Facility: CLINIC | Age: 61
End: 2021-05-27
Payer: COMMERCIAL

## 2021-05-27 VITALS
TEMPERATURE: 98.9 F | HEART RATE: 64 BPM | WEIGHT: 198.6 LBS | OXYGEN SATURATION: 95 % | SYSTOLIC BLOOD PRESSURE: 134 MMHG | HEIGHT: 67 IN | DIASTOLIC BLOOD PRESSURE: 94 MMHG | BODY MASS INDEX: 31.17 KG/M2

## 2021-05-27 DIAGNOSIS — I10 ESSENTIAL HYPERTENSION: ICD-10-CM

## 2021-05-27 DIAGNOSIS — S80.12XA HEMATOMA OF LEFT LOWER LEG: ICD-10-CM

## 2021-05-27 DIAGNOSIS — Z13.1 SCREENING FOR DIABETES MELLITUS: ICD-10-CM

## 2021-05-27 DIAGNOSIS — Z12.5 SCREENING FOR PROSTATE CANCER: ICD-10-CM

## 2021-05-27 DIAGNOSIS — Z13.6 SCREENING FOR CARDIOVASCULAR CONDITION: ICD-10-CM

## 2021-05-27 DIAGNOSIS — S80.812A ABRASION OF ANTERIOR LEFT LOWER LEG, INITIAL ENCOUNTER: Primary | ICD-10-CM

## 2021-05-27 PROCEDURE — 3725F SCREEN DEPRESSION PERFORMED: CPT | Performed by: FAMILY MEDICINE

## 2021-05-27 PROCEDURE — 1036F TOBACCO NON-USER: CPT | Performed by: FAMILY MEDICINE

## 2021-05-27 PROCEDURE — 3075F SYST BP GE 130 - 139MM HG: CPT | Performed by: FAMILY MEDICINE

## 2021-05-27 PROCEDURE — 3008F BODY MASS INDEX DOCD: CPT | Performed by: FAMILY MEDICINE

## 2021-05-27 PROCEDURE — 99213 OFFICE O/P EST LOW 20 MIN: CPT | Performed by: FAMILY MEDICINE

## 2021-05-27 PROCEDURE — 3080F DIAST BP >= 90 MM HG: CPT | Performed by: FAMILY MEDICINE

## 2021-05-27 NOTE — PROGRESS NOTES
Assessment/Plan:  Problem List Items Addressed This Visit        Cardiovascular and Mediastinum    Essential hypertension    Relevant Orders    CBC and differential    Comprehensive metabolic panel    LDL cholesterol, direct    Lipid panel    TSH, 3rd generation with Free T4 reflex    UA (URINE) with reflex to Scope       Musculoskeletal and Integument    Abrasion of anterior left lower leg - Primary     The abrasion on the patient's left lower leg is healing well  He will continue with local care with the topical Bactroban ointment and dressing changes  He can gradually increase his activity  There is no evidence of infection  He will call with any worsening redness or swelling  Other    Hematoma of left lower leg     The patient has a resolving hematoma of his left lower leg from where his leg hit the car door  I advised the patient is will continue to resolve  He will call with any worsening symptoms  He will go for the testing as ordered and will follow up with the results  Other Visit Diagnoses     Screening for cardiovascular condition        Relevant Orders    CBC and differential    Comprehensive metabolic panel    LDL cholesterol, direct    Lipid panel    TSH, 3rd generation with Free T4 reflex    UA (URINE) with reflex to Scope    Screening for diabetes mellitus        Relevant Orders    CBC and differential    Comprehensive metabolic panel    LDL cholesterol, direct    Lipid panel    TSH, 3rd generation with Free T4 reflex    UA (URINE) with reflex to Scope    Screening for prostate cancer        Relevant Orders    PSA, ultrasensitive          Return for Annual physical      Subjective:   Chief Complaint   Patient presents with    Follow-up     Follow up "SLUB" 2 days ago--leg abrasion        Patient ID: Linda Patel is a 63 y o  male presents today for follow-up of the abrasion on his left lower leg    Linda Patel is a 63 y o  male who presents today for a follow-up from recent ER visit on 5/25/2021 after hitting his left lower leg on his car door resulting of an abrasion and bruising of his left lower leg  He went to the ER because of marked swelling of the left lower leg which did improve  He has been doing local care since then and continues to improve but he still has some discomfort  Left anterior leg-there is swelling with standing and burning into his ankles  There is no bleeding  There is no redness  There is no numbness or tingling or fevers  He bleeds easily and is getting worse  There is burning in his leg  He does feel like he has been improving overall  The patient denies any chest pain, shortness of breath, or palpitations  There is no edema  There are no headaches or visual changes  There is no lightheadedness, dizziness, or fainting spells          The following portions of the patient's history were reviewed and updated as appropriate: allergies, current medications, past family history, past medical history, past social history, past surgical history and problem list   Patient Active Problem List   Diagnosis    Essential hypertension    ZIA (obstructive sleep apnea)    Insufficient sleep syndrome    Hypersomnia    Palpitations    Allergic rhinitis    Chronic bilateral low back pain with bilateral sciatica    Chronic myofascial pain    Esophageal reflux    Hemangioma of skin    Displacement of lumbar intervertebral disc without myelopathy    Lumbar spondylolysis    Multiple pigmented nevi    Primary localized osteoarthritis of right hip    Primary localized osteoarthritis of right knee    Rectus diastasis    Chronic pansinusitis    Arthritis of right hip    Lumbar radiculopathy    Squamous cell skin cancer- scalp    Primary osteoarthritis of both hands    Thrombophlebitis of superficial veins of left lower extremity    Abrasion of anterior left lower leg    Hematoma of left lower leg     Past Medical History: Diagnosis Date    Arthritis     Fingers, lower back ,right hip    Chronic pain     CPAP (continuous positive airway pressure) dependence     GERD (gastroesophageal reflux disease)     Hemangioma of skin     Hypertension     Irritable bowel syndrome     Lumbar spondylolysis     Multiple pigmented nevi     Obstructive sleep apnea syndrome     PONV (postoperative nausea and vomiting)     Premature atrial beats     Premature ventricular beats     Rectus diastasis     Sleep apnea     Umbilical hernia      Past Surgical History:   Procedure Laterality Date    HERNIA REPAIR      KNEE ARTHROSCOPY Right     AK REPAIR UMBILICAL CSOI,5+Z/P,FOBPC N/A 12/12/2017    Procedure: OPEN UMBILICAL HERNIA REPAIR WITH MESH;  Surgeon: Yoni Arthur MD;  Location:  MAIN OR;  Service: General    WISDOM TOOTH EXTRACTION Left      Allergies   Allergen Reactions    Lisinopril Swelling    Ibuprofen Rash    Penicillins Rash     Other reaction(s): Rash     Family History   Problem Relation Age of Onset    Rheum arthritis Mother     Osteoarthritis Mother     Heart disease Father     Hypertension Father     No Known Problems Sister     No Known Problems Brother      Social History     Socioeconomic History    Marital status: /Civil Union     Spouse name: Not on file    Number of children: Not on file    Years of education: Not on file    Highest education level: Not on file   Occupational History    Not on file   Social Needs    Financial resource strain: Not hard at all   [place] insecurity     Worry: Never true     Inability: Never true    Transportation needs     Medical: No     Non-medical: No   Tobacco Use    Smoking status: Never Smoker    Smokeless tobacco: Never Used   Substance and Sexual Activity    Alcohol use:  Yes     Alcohol/week: 4 0 standard drinks     Types: 4 Cans of beer per week     Frequency: Monthly or less     Drinks per session: 1 or 2    Drug use: No    Sexual activity: Yes     Partners: Female     Birth control/protection: None   Lifestyle    Physical activity     Days per week: 3 days     Minutes per session: 60 min    Stress: Not at all   Relationships    Social connections     Talks on phone: More than three times a week     Gets together: Once a week     Attends Jewish service: More than 4 times per year     Active member of club or organization: Yes     Attends meetings of clubs or organizations: More than 4 times per year     Relationship status:     Intimate partner violence     Fear of current or ex partner: No     Emotionally abused: No     Physically abused: No     Forced sexual activity: No   Other Topics Concern    Not on file   Social History Narrative    Not on file     Current Outpatient Medications on File Prior to Visit   Medication Sig Dispense Refill    amLODIPine (NORVASC) 5 mg tablet Take 1 tablet (5 mg total) by mouth daily 90 tablet 1    meloxicam (MOBIC) 15 mg tablet Take 1 tablet (15 mg total) by mouth daily 90 tablet 1    pantoprazole (PROTONIX) 40 mg tablet Take 40 mg by mouth daily       No current facility-administered medications on file prior to visit  Review of Systems   Constitutional: Negative  HENT: Negative  Eyes: Negative  Respiratory: Negative  Cardiovascular: Negative  Gastrointestinal: Negative  Endocrine: Negative  Genitourinary: Negative  Musculoskeletal: Negative  Skin: Positive for wound  Allergic/Immunologic: Negative  Neurological: Negative  Hematological: Negative  Psychiatric/Behavioral: Negative  Objective:  Vitals:    05/27/21 1628 05/27/21 1717   BP: 150/94 134/94   BP Location: Left arm    Patient Position: Sitting    Cuff Size: Standard    Pulse: 72 64   Temp: 98 9 °F (37 2 °C)    TempSrc: Tympanic    SpO2: 95%    Weight: 90 1 kg (198 lb 9 6 oz)    Height: 5' 7" (1 702 m)      Body mass index is 31 11 kg/m²       Physical Exam  Vitals signs and nursing note reviewed  Constitutional:       General: He is not in acute distress  Appearance: He is well-developed  He is not diaphoretic  Eyes:      Pupils: Pupils are equal, round, and reactive to light  Neck:      Musculoskeletal: Normal range of motion and neck supple  Thyroid: No thyromegaly  Vascular: No JVD  Trachea: No tracheal deviation  Cardiovascular:      Rate and Rhythm: Normal rate and regular rhythm  Heart sounds: Normal heart sounds  No murmur  No friction rub  No gallop  Pulmonary:      Effort: Pulmonary effort is normal  No respiratory distress  Breath sounds: Normal breath sounds  No stridor  No wheezing or rales  Chest:      Chest wall: No tenderness  Abdominal:      General: Bowel sounds are normal  There is no distension  Palpations: Abdomen is soft  There is no mass  Tenderness: There is no abdominal tenderness  There is no guarding or rebound  Musculoskeletal: Normal range of motion  Lymphadenopathy:      Cervical: No cervical adenopathy  Skin:     General: Skin is warm and dry  Coloration: Skin is not pale  Findings: No erythema or rash  Comments: There is a small healing abrasion on the patient's left anterior lower leg which is healing well with some mild surrounding redness  There is no bleeding or discharge  There is mild swelling  Neurological:      Mental Status: He is alert and oriented to person, place, and time  Cranial Nerves: No cranial nerve deficit  Motor: No abnormal muscle tone  Coordination: Coordination normal       Deep Tendon Reflexes: Reflexes are normal and symmetric   Reflexes normal

## 2021-05-28 PROBLEM — S80.12XA HEMATOMA OF LEFT LOWER LEG: Status: ACTIVE | Noted: 2021-05-28

## 2021-05-28 PROBLEM — S80.812A ABRASION OF ANTERIOR LEFT LOWER LEG: Status: ACTIVE | Noted: 2021-05-28

## 2021-05-28 NOTE — ASSESSMENT & PLAN NOTE
The patient has a resolving hematoma of his left lower leg from where his leg hit the car door  I advised the patient is will continue to resolve  He will call with any worsening symptoms  He will go for the testing as ordered and will follow up with the results

## 2021-05-28 NOTE — ASSESSMENT & PLAN NOTE
The abrasion on the patient's left lower leg is healing well  He will continue with local care with the topical Bactroban ointment and dressing changes  He can gradually increase his activity  There is no evidence of infection  He will call with any worsening redness or swelling

## 2021-06-23 ENCOUNTER — APPOINTMENT (OUTPATIENT)
Dept: LAB | Facility: CLINIC | Age: 61
End: 2021-06-23
Payer: COMMERCIAL

## 2021-06-23 DIAGNOSIS — Z13.1 SCREENING FOR DIABETES MELLITUS: ICD-10-CM

## 2021-06-23 DIAGNOSIS — I10 ESSENTIAL HYPERTENSION: ICD-10-CM

## 2021-06-23 DIAGNOSIS — Z12.5 SCREENING FOR PROSTATE CANCER: ICD-10-CM

## 2021-06-23 DIAGNOSIS — Z13.6 SCREENING FOR CARDIOVASCULAR CONDITION: ICD-10-CM

## 2021-06-23 LAB
ALBUMIN SERPL BCP-MCNC: 4.1 G/DL (ref 3.5–5)
ALP SERPL-CCNC: 71 U/L (ref 46–116)
ALT SERPL W P-5'-P-CCNC: 41 U/L (ref 12–78)
ANION GAP SERPL CALCULATED.3IONS-SCNC: 4 MMOL/L (ref 4–13)
AST SERPL W P-5'-P-CCNC: 24 U/L (ref 5–45)
BASOPHILS # BLD AUTO: 0.06 THOUSANDS/ΜL (ref 0–0.1)
BASOPHILS NFR BLD AUTO: 1 % (ref 0–1)
BILIRUB SERPL-MCNC: 0.69 MG/DL (ref 0.2–1)
BILIRUB UR QL STRIP: NEGATIVE
BUN SERPL-MCNC: 17 MG/DL (ref 5–25)
CALCIUM SERPL-MCNC: 8.8 MG/DL (ref 8.3–10.1)
CHLORIDE SERPL-SCNC: 111 MMOL/L (ref 100–108)
CHOLEST SERPL-MCNC: 142 MG/DL (ref 50–200)
CLARITY UR: NORMAL
CO2 SERPL-SCNC: 26 MMOL/L (ref 21–32)
COLOR UR: YELLOW
CREAT SERPL-MCNC: 1.03 MG/DL (ref 0.6–1.3)
EOSINOPHIL # BLD AUTO: 0.09 THOUSAND/ΜL (ref 0–0.61)
EOSINOPHIL NFR BLD AUTO: 2 % (ref 0–6)
ERYTHROCYTE [DISTWIDTH] IN BLOOD BY AUTOMATED COUNT: 13.4 % (ref 11.6–15.1)
GFR SERPL CREATININE-BSD FRML MDRD: 79 ML/MIN/1.73SQ M
GLUCOSE P FAST SERPL-MCNC: 83 MG/DL (ref 65–99)
GLUCOSE UR STRIP-MCNC: NEGATIVE MG/DL
HCT VFR BLD AUTO: 45.6 % (ref 36.5–49.3)
HDLC SERPL-MCNC: 37 MG/DL
HGB BLD-MCNC: 14.7 G/DL (ref 12–17)
HGB UR QL STRIP.AUTO: NEGATIVE
IMM GRANULOCYTES # BLD AUTO: 0.03 THOUSAND/UL (ref 0–0.2)
IMM GRANULOCYTES NFR BLD AUTO: 1 % (ref 0–2)
KETONES UR STRIP-MCNC: NEGATIVE MG/DL
LDLC SERPL CALC-MCNC: 85 MG/DL (ref 0–100)
LDLC SERPL DIRECT ASSAY-MCNC: 85 MG/DL (ref 0–100)
LEUKOCYTE ESTERASE UR QL STRIP: NEGATIVE
LYMPHOCYTES # BLD AUTO: 1.49 THOUSANDS/ΜL (ref 0.6–4.47)
LYMPHOCYTES NFR BLD AUTO: 26 % (ref 14–44)
MCH RBC QN AUTO: 29.1 PG (ref 26.8–34.3)
MCHC RBC AUTO-ENTMCNC: 32.2 G/DL (ref 31.4–37.4)
MCV RBC AUTO: 90 FL (ref 82–98)
MONOCYTES # BLD AUTO: 0.47 THOUSAND/ΜL (ref 0.17–1.22)
MONOCYTES NFR BLD AUTO: 8 % (ref 4–12)
NEUTROPHILS # BLD AUTO: 3.63 THOUSANDS/ΜL (ref 1.85–7.62)
NEUTS SEG NFR BLD AUTO: 62 % (ref 43–75)
NITRITE UR QL STRIP: NEGATIVE
NONHDLC SERPL-MCNC: 105 MG/DL
NRBC BLD AUTO-RTO: 0 /100 WBCS
PH UR STRIP.AUTO: 6 [PH]
PLATELET # BLD AUTO: 190 THOUSANDS/UL (ref 149–390)
PMV BLD AUTO: 11.2 FL (ref 8.9–12.7)
POTASSIUM SERPL-SCNC: 4.1 MMOL/L (ref 3.5–5.3)
PROT SERPL-MCNC: 7.3 G/DL (ref 6.4–8.2)
PROT UR STRIP-MCNC: NEGATIVE MG/DL
RBC # BLD AUTO: 5.06 MILLION/UL (ref 3.88–5.62)
SODIUM SERPL-SCNC: 141 MMOL/L (ref 136–145)
SP GR UR STRIP.AUTO: 1.02 (ref 1–1.03)
TRIGL SERPL-MCNC: 101 MG/DL
TSH SERPL DL<=0.05 MIU/L-ACNC: 2.6 UIU/ML (ref 0.36–3.74)
UROBILINOGEN UR QL STRIP.AUTO: 0.2 E.U./DL
WBC # BLD AUTO: 5.77 THOUSAND/UL (ref 4.31–10.16)

## 2021-06-23 PROCEDURE — 85025 COMPLETE CBC W/AUTO DIFF WBC: CPT

## 2021-06-23 PROCEDURE — 83721 ASSAY OF BLOOD LIPOPROTEIN: CPT

## 2021-06-23 PROCEDURE — 36415 COLL VENOUS BLD VENIPUNCTURE: CPT

## 2021-06-23 PROCEDURE — 80053 COMPREHEN METABOLIC PANEL: CPT

## 2021-06-23 PROCEDURE — 84153 ASSAY OF PSA TOTAL: CPT

## 2021-06-23 PROCEDURE — 84443 ASSAY THYROID STIM HORMONE: CPT

## 2021-06-23 PROCEDURE — 81003 URINALYSIS AUTO W/O SCOPE: CPT

## 2021-06-23 PROCEDURE — 80061 LIPID PANEL: CPT

## 2021-06-24 ENCOUNTER — RA CDI HCC (OUTPATIENT)
Dept: OTHER | Facility: HOSPITAL | Age: 61
End: 2021-06-24

## 2021-06-24 NOTE — PROGRESS NOTES
[facility] 75  coding opportunities          Chart reviewed, no opportunity found: CHART REVIEWED, NO OPPORTUNITY FOUND                     Patients insurance company: [employer] (Medicare Advantage and Commercial)

## 2021-06-25 LAB — PSA SERPL DL<=0.01 NG/ML-MCNC: 1.61 NG/ML (ref 0–4)

## 2021-07-01 ENCOUNTER — OFFICE VISIT (OUTPATIENT)
Dept: FAMILY MEDICINE CLINIC | Facility: CLINIC | Age: 61
End: 2021-07-01
Payer: COMMERCIAL

## 2021-07-01 VITALS
SYSTOLIC BLOOD PRESSURE: 160 MMHG | BODY MASS INDEX: 31.86 KG/M2 | DIASTOLIC BLOOD PRESSURE: 100 MMHG | HEIGHT: 67 IN | TEMPERATURE: 98 F | WEIGHT: 203 LBS | HEART RATE: 68 BPM | RESPIRATION RATE: 18 BRPM | OXYGEN SATURATION: 97 %

## 2021-07-01 DIAGNOSIS — Z00.00 ANNUAL PHYSICAL EXAM: Primary | ICD-10-CM

## 2021-07-01 DIAGNOSIS — I10 ESSENTIAL HYPERTENSION: ICD-10-CM

## 2021-07-01 DIAGNOSIS — R00.2 PALPITATIONS: ICD-10-CM

## 2021-07-01 PROCEDURE — 99396 PREV VISIT EST AGE 40-64: CPT | Performed by: FAMILY MEDICINE

## 2021-07-01 PROCEDURE — 99213 OFFICE O/P EST LOW 20 MIN: CPT | Performed by: FAMILY MEDICINE

## 2021-07-01 RX ORDER — AMLODIPINE BESYLATE 10 MG/1
10 TABLET ORAL DAILY
Qty: 90 TABLET | Refills: 1 | Status: SHIPPED | OUTPATIENT
Start: 2021-07-01 | End: 2021-11-24

## 2021-07-01 NOTE — PATIENT INSTRUCTIONS

## 2021-07-01 NOTE — PROGRESS NOTES
[address] [facility]    NAME: Bruno Forrest  AGE: 63 y o  SEX: male  : 1960     DATE: 2021     Assessment and Plan:     Problem List Items Addressed This Visit        Cardiovascular and Mediastinum    Essential hypertension     The patient's blood pressure was high today in the office and is currently not controlled with his current medication  We will increase the amlodipine to 10 mg daily as ordered and he will start monitoring his blood pressure at home will call with the readings in 1-2 weeks  In addition, we will send him for the cardiac testing as ordered we will follow up closely with the results we will see him back in the office as scheduled  Relevant Medications    amLODIPine (NORVASC) 10 mg tablet    Other Relevant Orders    ECG 12 lead    Echo complete with contrast if indicated    Holter monitor - 24 hour       Other    Palpitations    Relevant Orders    Echo complete with contrast if indicated    Holter monitor - 24 hour      Other Visit Diagnoses     Annual physical exam    -  Primary          Immunizations and preventive care screenings were discussed with patient today  Appropriate education was printed on patient's after visit summary  Counseling:  Dental Health: discussed importance of regular tooth brushing, flossing, and dental visits  Injury prevention: discussed safety/seat belts, safety helmets, smoke detectors, carbon dioxide detectors, and smoking near bedding or upholstery  · Exercise: the importance of regular exercise/physical activity was discussed  Recommend exercise 3-5 times per week for at least 30 minutes  Return in about 1 month (around 2021) for Recheck       Chief Complaint:     Chief Complaint   Patient presents with    Annual Exam     Complete Physical 63year old      History of Present Illness:     Adult Annual Physical   Patient here for a comprehensive physical exam  The patient reports problems - fatigue  Angela Viera is a 63 y o  male who presents today for a complete physical  he   has been feeling well and has no complaints today  The patient denies any chest pain, shortness of breath, or palpitations  There is no edema  There are no headaches or visual changes  There is no lightheadedness, dizziness, or fainting spells  There are no GI symptoms  The patient goes for dental exams every 6 months and sees his eye doctor  The patient is watching his diet and follows a regular exercise program    The patient continues to see his urologist every 6 months for prostate cancer screening  He complains of fatigue and not exercising  He continues to see urology on a regular basis  Diet and Physical Activity  · Diet/Nutrition: well balanced diet, limited junk food and consuming 3-5 servings of fruits/vegetables daily  · Exercise: walking and moderate cardiovascular exercise  Depression Screening  PHQ-9 Depression Screening    PHQ-9:   Frequency of the following problems over the past two weeks:      Little interest or pleasure in doing things: 0 - not at all  Feeling down, depressed, or hopeless: 0 - not at all  PHQ-2 Score: 0       General Health  · Sleep: sleeps well  · Hearing: normal - bilateral   · Vision: no vision problems, goes for regular eye exams, most recent eye exam <1 year ago and wears glasses  · Dental: regular dental visits and brushes teeth twice daily   Health  · Symptoms include: none     Review of Systems:     Review of Systems   Constitutional: Negative  HENT: Negative  Eyes: Negative  Respiratory: Negative  Cardiovascular: Negative  Gastrointestinal: Negative  Endocrine: Negative  Genitourinary: Negative  Musculoskeletal: Negative  Skin: Negative  Allergic/Immunologic: Negative  Neurological: Negative  Hematological: Negative      Psychiatric/Behavioral: Negative  Past Medical History:     Past Medical History:   Diagnosis Date    Arthritis     Fingers, lower back ,right hip    Chronic pain     CPAP (continuous positive airway pressure) dependence     GERD (gastroesophageal reflux disease)     Hemangioma of skin     Hypertension     Irritable bowel syndrome     Lumbar spondylolysis     Multiple pigmented nevi     Obstructive sleep apnea syndrome     PONV (postoperative nausea and vomiting)     Premature atrial beats     Premature ventricular beats     Rectus diastasis     Sleep apnea     Umbilical hernia       Past Surgical History:     Past Surgical History:   Procedure Laterality Date    HERNIA REPAIR      KNEE ARTHROSCOPY Right     AL REPAIR UMBILICAL UNUR,8+P/T,YHLKD N/A 12/12/2017    Procedure: OPEN UMBILICAL HERNIA REPAIR WITH MESH;  Surgeon: Aleshia Jovel MD;  Location: [facility] OR;  Service: General    WISDOM TOOTH EXTRACTION Left       Family History:     Family History   Problem Relation Age of Onset    Rheum arthritis Mother     Osteoarthritis Mother     Thrombocytopenia Mother     Heart disease Father     Hypertension Father     No Known Problems Sister     No Known Problems Brother       Social History:     Social History     Socioeconomic History    Marital status: /Civil Union     Spouse name: None    Number of children: None    Years of education: None    Highest education level: None   Occupational History    None   Tobacco Use    Smoking status: Never Smoker    Smokeless tobacco: Never Used   Vaping Use    Vaping Use: Never used   Substance and Sexual Activity    Alcohol use:  Yes     Alcohol/week: 4 0 standard drinks     Types: 4 Cans of beer per week    Drug use: No    Sexual activity: Yes     Partners: Female     Birth control/protection: None   Other Topics Concern    None   Social History Narrative    None     Social Determinants of Health     Financial Resource Strain: Low Risk     Difficulty of Paying Living Expenses: Not hard at all   Food Insecurity: No Food Insecurity    Worried About Running Out of Food in the Last Year: Never true    Ran Out of Food in the Last Year: Never true   Transportation Needs: No Transportation Needs    Lack of Transportation (Medical): No    Lack of Transportation (Non-Medical): No   Physical Activity: Inactive    Days of Exercise per Week: 0 days    Minutes of Exercise per Session: 0 min   Stress: Stress Concern Present    Feeling of Stress : To some extent   Social Connections: Socially Integrated    Frequency of Communication with Friends and Family: More than three times a week    Frequency of Social Gatherings with Friends and Family: Once a week    Attends Latter-day Services: More than 4 times per year    Active Member of [employer] or Organizations: Yes    Attends Club or Organization Meetings: More than 4 times per year    Marital Status:    Intimate Partner Violence: Not At Risk    Fear of Current or Ex-Partner: No    Emotionally Abused: No    Physically Abused: No    Sexually Abused: No      Current Medications:     Current Outpatient Medications   Medication Sig Dispense Refill    amLODIPine (NORVASC) 10 mg tablet Take 1 tablet (10 mg total) by mouth daily 90 tablet 1    meloxicam (MOBIC) 15 mg tablet Take 1 tablet (15 mg total) by mouth daily 90 tablet 1    pantoprazole (PROTONIX) 40 mg tablet Take 40 mg by mouth daily       No current facility-administered medications for this visit  Allergies: Allergies   Allergen Reactions    Lisinopril Swelling    Ibuprofen Rash    Penicillins Rash     Other reaction(s): Rash      Physical Exam:     /100   Pulse 68   Temp 98 °F (36 7 °C) (Tympanic)   Resp 18   Ht 5' 7" (1 702 m)   Wt 92 1 kg (203 lb)   SpO2 97%   BMI 31 79 kg/m²     Physical Exam  Vitals and nursing note reviewed  Constitutional:       Appearance: He is well-developed     HENT:      Head: Normocephalic and atraumatic  Right Ear: External ear normal       Left Ear: External ear normal       Nose: Nose normal       Mouth/Throat:      Pharynx: No oropharyngeal exudate  Eyes:      Conjunctiva/sclera: Conjunctivae normal       Pupils: Pupils are equal, round, and reactive to light  Neck:      Thyroid: No thyromegaly  Trachea: No tracheal deviation  Cardiovascular:      Rate and Rhythm: Normal rate and regular rhythm  Heart sounds: Normal heart sounds  No murmur heard  No friction rub  No gallop  Pulmonary:      Effort: Pulmonary effort is normal  No respiratory distress  Breath sounds: Normal breath sounds  No stridor  No wheezing or rales  Chest:      Chest wall: No tenderness  Abdominal:      General: Bowel sounds are normal  There is no distension  Palpations: Abdomen is soft  There is no mass  Tenderness: There is no abdominal tenderness  There is no guarding or rebound  Hernia: No hernia is present  Genitourinary:     Penis: No tenderness  Musculoskeletal:         General: No tenderness or deformity  Normal range of motion  Cervical back: Normal range of motion and neck supple  Lymphadenopathy:      Cervical: No cervical adenopathy  Skin:     General: Skin is warm and dry  Coloration: Skin is not pale  Findings: No erythema or rash  Neurological:      Mental Status: He is alert and oriented to person, place, and time  Cranial Nerves: No cranial nerve deficit  Sensory: No sensory deficit  Motor: No abnormal muscle tone  Coordination: Coordination normal       Deep Tendon Reflexes: Reflexes normal    Psychiatric:         Behavior: Behavior normal          Thought Content:  Thought content normal          Judgment: Judgment normal         Appointment on 06/23/2021   Component Date Value    WBC 06/23/2021 5 77     RBC 06/23/2021 5 06     Hemoglobin 06/23/2021 14 7     Hematocrit 06/23/2021 45 6     MCV 06/23/2021 [place] 06/23/2021 29 1     MCHC 06/23/2021 32 2     RDW 06/23/2021 13 4     MPV 06/23/2021 11 2     Platelets 25/20/5911 190     nRBC 06/23/2021 0     Neutrophils Relative 06/23/2021 62     Immat GRANS % 06/23/2021 1     Lymphocytes Relative 06/23/2021 26     Monocytes Relative 06/23/2021 8     Eosinophils Relative 06/23/2021 2     Basophils Relative 06/23/2021 1     Neutrophils Absolute 06/23/2021 3 63     Immature Grans Absolute 06/23/2021 0 03     Lymphocytes Absolute 06/23/2021 1 49     Monocytes Absolute 06/23/2021 0 47     Eosinophils Absolute 06/23/2021 0 09     Basophils Absolute 06/23/2021 0 06     Sodium 06/23/2021 141     Potassium 06/23/2021 4 1     Chloride 06/23/2021 111*    CO2 06/23/2021 26     ANION GAP 06/23/2021 4     BUN 06/23/2021 17     Creatinine 06/23/2021 1 03     Glucose, Fasting 06/23/2021 83     Calcium 06/23/2021 8 8     AST 06/23/2021 24     ALT 06/23/2021 41     Alkaline Phosphatase 06/23/2021 71     Total Protein 06/23/2021 7 3     Albumin 06/23/2021 4 1     Total Bilirubin 06/23/2021 0 69     eGFR 06/23/2021 79     LDL Direct 06/23/2021 85     Cholesterol 06/23/2021 142     Triglycerides 06/23/2021 101     HDL, Direct 06/23/2021 37*    LDL Calculated 06/23/2021 85     Non-HDL-Chol (CHOL-HDL) 06/23/2021 105     TSH 3RD GENERATON 06/23/2021 2 600     Color, UA 06/23/2021 Yellow     Clarity, UA 06/23/2021 Cloudy     Specific Gravity, UA 06/23/2021 1 024     pH, UA 06/23/2021 6 0     Leukocytes, UA 06/23/2021 Negative     Nitrite, UA 06/23/2021 Negative     Protein, UA 06/23/2021 Negative     Glucose, UA 06/23/2021 Negative     Ketones, UA 06/23/2021 Negative     Urobilinogen, UA 06/23/2021 0 2     Bilirubin, UA 06/23/2021 Negative     Blood, UA 06/23/2021 Negative     PSA, Ultrasensitive 06/23/2021 1 610    Admission on 05/05/2021, Discharged on 05/05/2021   Component Date Value    Sodium 05/05/2021 142     Potassium 05/05/2021 4 1     Chloride 05/05/2021 106     CO2 05/05/2021 28     ANION GAP 05/05/2021 8     BUN 05/05/2021 15     Creatinine 05/05/2021 1 08     Glucose 05/05/2021 89     Calcium 05/05/2021 8 5     eGFR 05/05/2021 75           Yohana Sewell DO  [facility]

## 2021-07-05 NOTE — ASSESSMENT & PLAN NOTE
The patient's blood pressure was high today in the office and is currently not controlled with his current medication  We will increase the amlodipine to 10 mg daily as ordered and he will start monitoring his blood pressure at home will call with the readings in 1-2 weeks  In addition, we will send him for the cardiac testing as ordered we will follow up closely with the results we will see him back in the office as scheduled

## 2021-07-05 NOTE — PROGRESS NOTES
Assessment/Plan:  Problem List Items Addressed This Visit        Cardiovascular and Mediastinum    Essential hypertension     The patient's blood pressure was high today in the office and is currently not controlled with his current medication  We will increase the amlodipine to 10 mg daily as ordered and he will start monitoring his blood pressure at home will call with the readings in 1-2 weeks  In addition, we will send him for the cardiac testing as ordered we will follow up closely with the results we will see him back in the office as scheduled  Relevant Medications    amLODIPine (NORVASC) 10 mg tablet    Other Relevant Orders    ECG 12 lead    Echo complete with contrast if indicated    Holter monitor - 24 hour       Other    Palpitations    Relevant Orders    Echo complete with contrast if indicated    Holter monitor - 24 hour      Other Visit Diagnoses     Annual physical exam    -  Primary          Return in about 1 month (around 8/1/2021) for Recheck  Subjective:   Chief Complaint   Patient presents with    Annual Exam     Complete Physical 63year old        Patient ID: Leena Lin is a 63 y o  male presents today for elevated blood pressure and palpitations     Leena Lin is a 63 y o  male who is here today for complete physical but has evidence of high blood pressure readings  He is currently taking amlodipine 5 mg daily  He is complaining of more fatigue and frequent palpitations  There is no lightheadedness dizziness, or fainting spells  There is no chest pain, shortness of breath  There is no edema  There are no headaches or blurry vision  The patient currently denies any nausea, vomiting, or GERD symptoms  he has normal bowel movements and normal urine output  he has a normal appetite  Hypertension  This is a chronic problem  The current episode started more than 1 year ago  The problem is unchanged  The problem is uncontrolled   Associated symptoms include palpitations  Pertinent negatives include no anxiety, blurred vision, chest pain, headaches, malaise/fatigue, neck pain, orthopnea, peripheral edema, PND, shortness of breath or sweats       The following portions of the patient's history were reviewed and updated as appropriate: allergies, current medications, past family history, past medical history, past social history, past surgical history and problem list   Patient Active Problem List   Diagnosis    Essential hypertension    ZIA (obstructive sleep apnea)    Insufficient sleep syndrome    Hypersomnia    Palpitations    Allergic rhinitis    Chronic bilateral low back pain with bilateral sciatica    Chronic myofascial pain    Esophageal reflux    Hemangioma of skin    Displacement of lumbar intervertebral disc without myelopathy    Lumbar spondylolysis    Multiple pigmented nevi    Primary localized osteoarthritis of right hip    Primary localized osteoarthritis of right knee    Rectus diastasis    Chronic pansinusitis    Arthritis of right hip    Lumbar radiculopathy    Squamous cell skin cancer- scalp    Primary osteoarthritis of both hands    Thrombophlebitis of superficial veins of left lower extremity    Abrasion of anterior left lower leg    Hematoma of left lower leg     Past Medical History:   Diagnosis Date    Arthritis     Fingers, lower back ,right hip    Chronic pain     CPAP (continuous positive airway pressure) dependence     GERD (gastroesophageal reflux disease)     Hemangioma of skin     Hypertension     Irritable bowel syndrome     Lumbar spondylolysis     Multiple pigmented nevi     Obstructive sleep apnea syndrome     PONV (postoperative nausea and vomiting)     Premature atrial beats     Premature ventricular beats     Rectus diastasis     Sleep apnea     Umbilical hernia      Past Surgical History:   Procedure Laterality Date    HERNIA REPAIR      KNEE ARTHROSCOPY Right     DE REPAIR UMBILICAL LHVY,9+V/G,INMSO N/A 12/12/2017    Procedure: OPEN UMBILICAL HERNIA REPAIR WITH MESH;  Surgeon: Tiffanie Scott MD;  Location:  MAIN OR;  Service: General    WISDOM TOOTH EXTRACTION Left      Allergies   Allergen Reactions    Lisinopril Swelling    Ibuprofen Rash    Penicillins Rash     Other reaction(s): Rash     Family History   Problem Relation Age of Onset    Rheum arthritis Mother     Osteoarthritis Mother     Thrombocytopenia Mother     Heart disease Father     Hypertension Father     No Known Problems Sister     No Known Problems Brother      Social History     Socioeconomic History    Marital status: /Civil Union     Spouse name: Not on file    Number of children: Not on file    Years of education: Not on file    Highest education level: Not on file   Occupational History    Not on file   Tobacco Use    Smoking status: Never Smoker    Smokeless tobacco: Never Used   Vaping Use    Vaping Use: Never used   Substance and Sexual Activity    Alcohol use: Yes     Alcohol/week: 4 0 standard drinks     Types: 4 Cans of beer per week    Drug use: No    Sexual activity: Yes     Partners: Female     Birth control/protection: None   Other Topics Concern    Not on file   Social History Narrative    Not on file     Social Determinants of Health     Financial Resource Strain: Low Risk     Difficulty of Paying Living Expenses: Not hard at all   Food Insecurity: No Food Insecurity    Worried About [address] in the Last Year: Never true    [facility] in the Last Year: Never true   Transportation Needs: No Transportation Needs    Lack of Transportation (Medical): No    Lack of Transportation (Non-Medical): No   Physical Activity: Inactive    Days of Exercise per Week: 0 days    Minutes of Exercise per Session: 0 min   Stress: Stress Concern Present    Feeling of Stress :  To some extent   Social Connections: Socially Integrated    Frequency of Communication with Friends and Family: More than three times a week    Frequency of Social Gatherings with Friends and Family: Once a week    Attends [employer] Services: More than 4 times per year    Active Member of [employer] or Organizations: Yes    Attends Club or Organization Meetings: More than 4 times per year    Marital Status:    Intimate Partner Violence: Not At Risk    Fear of Current or Ex-Partner: No    Emotionally Abused: No    Physically Abused: No    Sexually Abused: No     Current Outpatient Medications on File Prior to Visit   Medication Sig Dispense Refill    meloxicam (MOBIC) 15 mg tablet Take 1 tablet (15 mg total) by mouth daily 90 tablet 1    pantoprazole (PROTONIX) 40 mg tablet Take 40 mg by mouth daily       No current facility-administered medications on file prior to visit  Review of Systems   Constitutional: Negative  Negative for malaise/fatigue  HENT: Negative  Eyes: Negative  Negative for blurred vision  Respiratory: Negative  Negative for shortness of breath  Cardiovascular: Positive for palpitations  Negative for chest pain, orthopnea and PND  Gastrointestinal: Negative  Endocrine: Negative  Genitourinary: Negative  Musculoskeletal: Negative  Negative for neck pain  Skin: Negative  Allergic/Immunologic: Negative  Neurological: Negative  Negative for headaches  Hematological: Negative  Psychiatric/Behavioral: Negative  Objective:  Vitals:    07/01/21 0908 07/01/21 0951   BP: 160/100 160/100   BP Location: Left arm    Patient Position: Sitting    Cuff Size: Standard    Pulse: 64 68   Resp: 18    Temp: 98 °F (36 7 °C)    TempSrc: Tympanic    SpO2: 97%    Weight: 92 1 kg (203 lb)    Height: 5' 7" (1 702 m)      Body mass index is 31 79 kg/m²  Physical Exam  Vitals and nursing note reviewed  Constitutional:       General: He is not in acute distress  Appearance: He is well-developed  He is not diaphoretic     Eyes:      Pupils: Pupils are equal, round, and reactive to light  Neck:      Thyroid: No thyromegaly  Vascular: No JVD  Trachea: No tracheal deviation  Cardiovascular:      Rate and Rhythm: Normal rate and regular rhythm  Heart sounds: Normal heart sounds  No murmur heard  No friction rub  No gallop  Pulmonary:      Effort: Pulmonary effort is normal  No respiratory distress  Breath sounds: Normal breath sounds  No stridor  No wheezing or rales  Chest:      Chest wall: No tenderness  Abdominal:      General: Bowel sounds are normal  There is no distension  Palpations: Abdomen is soft  There is no mass  Tenderness: There is no abdominal tenderness  There is no guarding or rebound  Musculoskeletal:         General: Normal range of motion  Cervical back: Normal range of motion and neck supple  Lymphadenopathy:      Cervical: No cervical adenopathy  Skin:     General: Skin is warm and dry  Coloration: Skin is not pale  Findings: No erythema or rash  Neurological:      Mental Status: He is alert and oriented to person, place, and time  Cranial Nerves: No cranial nerve deficit  Motor: No abnormal muscle tone  Coordination: Coordination normal       Deep Tendon Reflexes: Reflexes are normal and symmetric   Reflexes normal          Appointment on 06/23/2021   Component Date Value    WBC 06/23/2021 5 77     RBC 06/23/2021 5 06     Hemoglobin 06/23/2021 14 7     Hematocrit 06/23/2021 45 6     MCV 06/23/2021 90     MCH 06/23/2021 29 1     MCHC 06/23/2021 32 2     RDW 06/23/2021 13 4     MPV 06/23/2021 11 2     Platelets 76/59/3471 190     nRBC 06/23/2021 0     Neutrophils Relative 06/23/2021 62     Immat GRANS % 06/23/2021 1     Lymphocytes Relative 06/23/2021 26     Monocytes Relative 06/23/2021 8     Eosinophils Relative 06/23/2021 2     Basophils Relative 06/23/2021 1     Neutrophils Absolute 06/23/2021 3 63     Immature Grans Absolute 06/23/2021 0 03     Lymphocytes Absolute 06/23/2021 1 49     Monocytes Absolute 06/23/2021 0 47     Eosinophils Absolute 06/23/2021 0 09     Basophils Absolute 06/23/2021 0 06     Sodium 06/23/2021 141     Potassium 06/23/2021 4 1     Chloride 06/23/2021 111*    CO2 06/23/2021 26     ANION GAP 06/23/2021 4     BUN 06/23/2021 17     Creatinine 06/23/2021 1 03     Glucose, Fasting 06/23/2021 83     Calcium 06/23/2021 8 8     AST 06/23/2021 24     ALT 06/23/2021 41     Alkaline Phosphatase 06/23/2021 71     Total Protein 06/23/2021 7 3     Albumin 06/23/2021 4 1     Total Bilirubin 06/23/2021 0 69     eGFR 06/23/2021 79     LDL Direct 06/23/2021 85     Cholesterol 06/23/2021 142     Triglycerides 06/23/2021 101     HDL, Direct 06/23/2021 37*    LDL Calculated 06/23/2021 85     Non-HDL-Chol (CHOL-HDL) 06/23/2021 105     TSH 3RD GENERATON 06/23/2021 2 600     Color, UA 06/23/2021 Yellow     Clarity, UA 06/23/2021 Cloudy     Specific Gravity, UA 06/23/2021 1 024     pH, UA 06/23/2021 6 0     Leukocytes, UA 06/23/2021 Negative     Nitrite, UA 06/23/2021 Negative     Protein, UA 06/23/2021 Negative     Glucose, UA 06/23/2021 Negative     Ketones, UA 06/23/2021 Negative     Urobilinogen, UA 06/23/2021 0 2     Bilirubin, UA 06/23/2021 Negative     Blood, UA 06/23/2021 Negative     PSA, Ultrasensitive 06/23/2021 1 610    Admission on 05/05/2021, Discharged on 05/05/2021   Component Date Value    Sodium 05/05/2021 142     Potassium 05/05/2021 4 1     Chloride 05/05/2021 106     CO2 05/05/2021 28     ANION GAP 05/05/2021 8     BUN 05/05/2021 15     Creatinine 05/05/2021 1 08     Glucose 05/05/2021 89     Calcium 05/05/2021 8 5     eGFR 05/05/2021 75

## 2021-07-13 ENCOUNTER — OFFICE VISIT (OUTPATIENT)
Dept: FAMILY MEDICINE CLINIC | Facility: CLINIC | Age: 61
End: 2021-07-13
Payer: COMMERCIAL

## 2021-07-13 VITALS
OXYGEN SATURATION: 98 % | RESPIRATION RATE: 16 BRPM | WEIGHT: 202.2 LBS | HEART RATE: 71 BPM | BODY MASS INDEX: 31.74 KG/M2 | HEIGHT: 67 IN | DIASTOLIC BLOOD PRESSURE: 86 MMHG | SYSTOLIC BLOOD PRESSURE: 140 MMHG | TEMPERATURE: 98.3 F

## 2021-07-13 DIAGNOSIS — I10 ESSENTIAL HYPERTENSION: ICD-10-CM

## 2021-07-13 DIAGNOSIS — R21 RASH: Primary | ICD-10-CM

## 2021-07-13 PROCEDURE — 1036F TOBACCO NON-USER: CPT | Performed by: NURSE PRACTITIONER

## 2021-07-13 PROCEDURE — 99214 OFFICE O/P EST MOD 30 MIN: CPT | Performed by: NURSE PRACTITIONER

## 2021-07-13 PROCEDURE — 3077F SYST BP >= 140 MM HG: CPT | Performed by: NURSE PRACTITIONER

## 2021-07-13 PROCEDURE — 3008F BODY MASS INDEX DOCD: CPT | Performed by: NURSE PRACTITIONER

## 2021-07-13 PROCEDURE — 3725F SCREEN DEPRESSION PERFORMED: CPT | Performed by: NURSE PRACTITIONER

## 2021-07-13 PROCEDURE — 3079F DIAST BP 80-89 MM HG: CPT | Performed by: NURSE PRACTITIONER

## 2021-07-13 RX ORDER — CLOTRIMAZOLE 1 %
CREAM (GRAM) TOPICAL 2 TIMES DAILY
Qty: 30 G | Refills: 2 | Status: SHIPPED | OUTPATIENT
Start: 2021-07-13

## 2021-07-13 NOTE — PATIENT INSTRUCTIONS
Rash - use twice daily on foot  Begin amlodipine at 10 mg as recommended by Dr Rafael Che  Follow up in one week

## 2021-07-13 NOTE — PROGRESS NOTES
Assessment/Plan   Problem List Items Addressed This Visit        Cardiovascular and Mediastinum    Essential hypertension      Other Visit Diagnoses     Rash    -  Primary    Relevant Medications    clotrimazole (LOTRIMIN) 1 % cream                Chief Complaint   Patient presents with    Swollen toe     Patient reports swelling in the padding under his toes and on the tops of his toes  It was a problem a few years back that they blamed on arthritis, but it came back this morning and is painful    Blood Pressure Check     Patient's been getting averave readings on 133/95 in the left arm & and 133/85 in the right arm  Subjective   Patient ID: Cherise Stevenson is a 63 y o  male  Vitals:    07/13/21 0849   BP: 140/86   Pulse: 71   Resp: 16   Temp: 98 3 °F (36 8 °C)   SpO2: 98%     Rash  This is a new (reports history of swollen toes on the left foot but now has pain under the toes, and redness on top of foot) problem  The current episode started yesterday  The problem is unchanged  The affected locations include the left foot  The rash is characterized by redness, pain and blistering  He was exposed to nothing  Pertinent negatives include no anorexia, fever, joint pain, shortness of breath or sore throat  Past treatments include nothing  The treatment provided no relief  There is no history of allergies, asthma, eczema or varicella  Hypertension  This is a chronic (Patient was in for yearly 7/1 and his BP was elevated, his amlopidine was increased to 10 mg PO daily but he wanted to try diet and exercise so he has not started the increased dose) problem  The current episode started more than 1 month ago  The problem is unchanged  The problem is resistant  Pertinent negatives include no blurred vision, chest pain, headaches, malaise/fatigue, orthopnea, palpitations, peripheral edema or shortness of breath  There are no associated agents to hypertension   Risk factors for coronary artery disease include male gender, stress and sedentary lifestyle  Past treatments include calcium channel blockers  The current treatment provides moderate improvement  There are no compliance problems  none known  none known  The following portions of the patient's history were reviewed and updated as appropriate: allergies, current medications, past family history, past social history, past surgical history and problem list     Review of Systems   Constitutional: Negative  Negative for fever and malaise/fatigue  HENT: Negative  Negative for sore throat  Eyes: Negative  Negative for blurred vision  Respiratory: Negative  Negative for shortness of breath  Cardiovascular: Negative  Negative for chest pain, palpitations and orthopnea  Gastrointestinal: Negative  Negative for anorexia  Endocrine: Negative  Genitourinary: Negative  Musculoskeletal: Positive for arthralgias  Negative for gait problem, joint pain and joint swelling  Skin: Positive for rash  Allergic/Immunologic: Negative  Neurological: Negative  Negative for headaches  Hematological: Negative  Psychiatric/Behavioral: Negative  Objective     Physical Exam  Vitals and nursing note reviewed  Constitutional:       General: He is not in acute distress  Appearance: Normal appearance  He is not ill-appearing  HENT:      Head: Normocephalic and atraumatic  Nose: Nose normal  No congestion  Eyes:      General:         Right eye: No discharge  Left eye: No discharge  Extraocular Movements: Extraocular movements intact  Conjunctiva/sclera: Conjunctivae normal       Pupils: Pupils are equal, round, and reactive to light  Cardiovascular:      Rate and Rhythm: Normal rate and regular rhythm  Pulses: Normal pulses  Heart sounds: Normal heart sounds  No murmur heard  Pulmonary:      Effort: Pulmonary effort is normal       Breath sounds: Normal breath sounds     Musculoskeletal: General: No tenderness or deformity  Normal range of motion  Cervical back: Normal range of motion and neck supple  Right lower leg: No edema  Left lower leg: No edema  Legs:    Skin:     General: Skin is warm and dry  Capillary Refill: Capillary refill takes less than 2 seconds  Findings: Rash present  Neurological:      Mental Status: He is alert and oriented to person, place, and time  Psychiatric:         Mood and Affect: Mood normal          Behavior: Behavior normal          Thought Content:  Thought content normal          Judgment: Judgment normal        Allergies   Allergen Reactions    Lisinopril Swelling    Ibuprofen Rash    Penicillins Rash     Other reaction(s): Rash

## 2021-07-14 ENCOUNTER — APPOINTMENT (EMERGENCY)
Dept: NON INVASIVE DIAGNOSTICS | Facility: HOSPITAL | Age: 61
End: 2021-07-14
Payer: COMMERCIAL

## 2021-07-14 ENCOUNTER — TELEPHONE (OUTPATIENT)
Dept: FAMILY MEDICINE CLINIC | Facility: CLINIC | Age: 61
End: 2021-07-14

## 2021-07-14 ENCOUNTER — HOSPITAL ENCOUNTER (EMERGENCY)
Facility: HOSPITAL | Age: 61
Discharge: HOME | End: 2021-07-14
Attending: EMERGENCY MEDICINE | Admitting: EMERGENCY MEDICINE
Payer: COMMERCIAL

## 2021-07-14 VITALS
TEMPERATURE: 97.6 F | DIASTOLIC BLOOD PRESSURE: 78 MMHG | BODY MASS INDEX: 31.64 KG/M2 | SYSTOLIC BLOOD PRESSURE: 142 MMHG | OXYGEN SATURATION: 100 % | RESPIRATION RATE: 20 BRPM | WEIGHT: 202 LBS | HEART RATE: 75 BPM

## 2021-07-14 DIAGNOSIS — L03.116 CELLULITIS OF LEFT FOOT: Primary | ICD-10-CM

## 2021-07-14 PROCEDURE — 99284 EMERGENCY DEPT VISIT MOD MDM: CPT

## 2021-07-14 PROCEDURE — 93971 EXTREMITY STUDY: CPT

## 2021-07-14 PROCEDURE — 99284 EMERGENCY DEPT VISIT MOD MDM: CPT | Performed by: EMERGENCY MEDICINE

## 2021-07-14 PROCEDURE — 93971 EXTREMITY STUDY: CPT | Performed by: SURGERY

## 2021-07-14 RX ORDER — CEPHALEXIN 500 MG/1
500 CAPSULE ORAL EVERY 6 HOURS SCHEDULED
Qty: 28 CAPSULE | Refills: 0 | Status: SHIPPED | OUTPATIENT
Start: 2021-07-14 | End: 2021-07-21

## 2021-07-14 NOTE — TELEPHONE ENCOUNTER
Incoming call; Patient was seen yesterday by Stanford Morocho about a rash  He notes that it's getting worse and the cream she prescribed is not helping  He also notes that the foot is also starting to swell  He's looking for recommendations  Can be reached at [phone]

## 2021-07-14 NOTE — TELEPHONE ENCOUNTER
I called and spoke with the patient on 7/14/2021 he states that the area of redness has worsened significantly since he was seen in the office yesterday on his left ankle on left foot  He has worsening swelling and pain in the left foot  He describes it as being the size of a balloon and he cannot get his shoe on  It is tender in the area  He does also have tenderness in the area where his superficial thrombophlebitis was 2 months ago  I advised the patient I am concerned about cellulitis are recurrent clot or an infectious thrombophlebitis  I advised him to go to the emergency room for evaluation  The patient is in agreement  I did call report about the patient to [phone]

## 2021-07-14 NOTE — ED PROVIDER NOTES
History  Chief Complaint   Patient presents with    Leg Swelling     To ED with c/o worsening lower leg swelling and redness  Recent DX of superficial phlebitis  Denies any SOB  This 61-year-old male with history of hypertension, osteoarthritis, obstructive sleep apnea obesity and premature atrial and ventricular beats has had tenderness of the toes of his left foot with erythema the dorsum of those toes and forefoot since yesterday  He had a prior episode of superficial thrombophlebitis of the left leg several months ago  He denies recent injury, fever or constitutional symptoms  He was seen by his PCP yesterday and started an antifungal rash but today the left foot is more swollen and more painful  Today also has an area of erythema of the left shin  Prior to Admission Medications   Prescriptions Last Dose Informant Patient Reported? Taking?    amLODIPine (NORVASC) 10 mg tablet  Self No No   Sig: Take 1 tablet (10 mg total) by mouth daily   Patient taking differently: Take 5 mg by mouth daily    clotrimazole (LOTRIMIN) 1 % cream   No No   Sig: Apply topically 2 (two) times a day   meloxicam (MOBIC) 15 mg tablet  Self No No   Sig: Take 1 tablet (15 mg total) by mouth daily   pantoprazole (PROTONIX) 40 mg tablet  Self Yes No   Sig: Take 40 mg by mouth daily      Facility-Administered Medications: None       Past Medical History:   Diagnosis Date    Arthritis     Fingers, lower back ,right hip    Chronic pain     CPAP (continuous positive airway pressure) dependence     GERD (gastroesophageal reflux disease)     Hemangioma of skin     Hypertension     Irritable bowel syndrome     Lumbar spondylolysis     Multiple pigmented nevi     Obstructive sleep apnea syndrome     PONV (postoperative nausea and vomiting)     Premature atrial beats     Premature ventricular beats     Rectus diastasis     Sleep apnea     Umbilical hernia        Past Surgical History:   Procedure Laterality Date    HERNIA REPAIR      KNEE ARTHROSCOPY Right     [place] REPAIR UMBILICAL IRSX,5+T/Z,XVADU N/A 12/12/2017    Procedure: OPEN UMBILICAL HERNIA REPAIR WITH MESH;  Surgeon: Desmond Aldridge MD;  Location:  MAIN OR;  Service: General    WISDOM TOOTH EXTRACTION Left        Family History   Problem Relation Age of Onset    Rheum arthritis Mother     Osteoarthritis Mother     Thrombocytopenia Mother     Heart disease Father     Hypertension Father     No Known Problems Sister     No Known Problems Brother      I have reviewed and agree with the history as documented  E-Cigarette/Vaping    E-Cigarette Use Never User      E-Cigarette/Vaping Substances    Nicotine No     THC No     CBD No     Flavoring No     Other No     Unknown No      Social History     Tobacco Use    Smoking status: Never Smoker    Smokeless tobacco: Never Used   Vaping Use    Vaping Use: Never used   Substance Use Topics    Alcohol use: Yes     Alcohol/week: 4 0 standard drinks     Types: 4 Cans of beer per week    Drug use: No       Review of Systems   Constitutional: Negative  HENT: Negative  Eyes: Negative  Respiratory: Negative  Cardiovascular: Negative  Gastrointestinal: Negative  Endocrine: Negative  Genitourinary: Negative  Musculoskeletal: Positive for arthralgias and joint swelling  Skin: Positive for rash (left foot since yesterday; on left shin as well today)  Allergic/Immunologic: Negative  Neurological: Negative  Hematological: Negative  Psychiatric/Behavioral: Negative  All other systems reviewed and are negative  Physical Exam  Physical Exam  Vitals and nursing note reviewed  Constitutional:       General: He is not in acute distress  Appearance: He is well-developed  He is obese  He is not ill-appearing or diaphoretic  HENT:      Head: Normocephalic and atraumatic        Right Ear: External ear normal       Left Ear: External ear normal       Mouth/Throat:      Mouth: Mucous membranes are moist       Pharynx: Oropharynx is clear  No oropharyngeal exudate or posterior oropharyngeal erythema  Eyes:      Conjunctiva/sclera: Conjunctivae normal       Pupils: Pupils are equal, round, and reactive to light  Neck:      Vascular: No JVD  Cardiovascular:      Rate and Rhythm: Normal rate and regular rhythm  Pulses: Normal pulses  Heart sounds: Normal heart sounds  No murmur heard  Pulmonary:      Effort: Pulmonary effort is normal       Breath sounds: Normal breath sounds  Abdominal:      General: Bowel sounds are normal       Palpations: Abdomen is soft  There is no mass  Tenderness: There is no abdominal tenderness  There is no guarding or rebound  Musculoskeletal:         General: Swelling present  No tenderness  Normal range of motion  Cervical back: Normal range of motion and neck supple  Right lower leg: No edema  Left lower leg: No edema  Comments: There is swelling of the left forefoot and toes  This is tender on the plantar aspect of the MCP joints  There is slight pain on passive plantar flexion of toes 2 through 5 on the left foot  Lymphadenopathy:      Cervical: No cervical adenopathy  Skin:     General: Skin is warm and dry  Capillary Refill: Capillary refill takes less than 2 seconds  Coloration: Skin is not pale  Findings: Rash present  No bruising  Comments: Erythematous macula over left shin without blistering, cellulitis or lymphangitis  Left foot is warm, red and edematous over distal forefoot and toes  There is no blistering, lymphangitis, drainage or crepitance  No notable skin break  No deformity  Neurological:      General: No focal deficit present  Mental Status: He is alert and oriented to person, place, and time  Mental status is at baseline  Cranial Nerves: No cranial nerve deficit        Coordination: Coordination normal       Deep Tendon Reflexes: Reflexes are normal and symmetric  Psychiatric:         Mood and Affect: Mood normal          Behavior: Behavior normal          Vital Signs  ED Triage Vitals [07/14/21 1308]   Temperature Pulse Respirations Blood Pressure SpO2   97 6 °F (36 4 °C) 75 20 142/78 100 %      Temp Source Heart Rate Source Patient Position - Orthostatic VS BP Location FiO2 (%)   Tympanic Monitor Sitting Right arm --      Pain Score       --           Vitals:    07/14/21 1308   BP: 142/78   Pulse: 75   Patient Position - Orthostatic VS: Sitting         Visual Acuity      ED Medications  Medications - No data to display    Diagnostic Studies  Results Reviewed     None                 VAS lower limb venous duplex study, unilateral/limited    (Results Pending)              Procedures  Procedures         ED Course  ED Course as of Jul 14 1513   Wed Jul 14, 2021   1453 Ultrasound tech reports small superficial thrombosis of superficial saphenous vein is unchanged from prior study  SBIRT 20yo+      Most Recent Value   SBIRT (24 yo +)   In order to provide better care to our patients, we are screening all of our patients for alcohol and drug use  Would it be okay to ask you these screening questions? No Filed at: 07/14/2021 1322                    MDM  Number of Diagnoses or Management Options  Cellulitis of left foot: new and requires workup  Diagnosis management comments: Acute cellulitis of left foot  Superficial thrombophlebitis of the superficial saphenous vein is unchanged from prior studies  Patient does not have any criteria for admission  Keflex prescribed and cellulitis instructions given    Patient has a appointment in 1 week with his podiatrist; he was told to follow-up in about 1 week for wound check       Amount and/or Complexity of Data Reviewed  Tests in the radiology section of CPT®: ordered and reviewed  Review and summarize past medical records: yes        Disposition  Final diagnoses:   Cellulitis of left foot Time reflects when diagnosis was documented in both MDM as applicable and the Disposition within this note     Time User Action Codes Description Comment    7/14/2021  3:05 PM Brenda Yg Add [L03 [id number]] Cellulitis of left foot       ED Disposition     ED Disposition Condition Date/Time Comment    Discharge Stable Wed Jul 14, 2021  3:05 PM Angela Viera discharge to home/self care  Follow-up Information     Follow up With Specialties Details Why Contact Info    Phyllis Teran DO Family Medicine Schedule an appointment as soon as possible for a visit in 1 week For wound re-check [address]  [phone]            Patient's Medications   Discharge Prescriptions    CEPHALEXIN (KEFLEX) 500 MG CAPSULE    Take 1 capsule (500 mg total) by mouth every 6 (six) hours for 7 days       Start Date: 7/14/2021 End Date: 7/21/2021       Order Dose: 500 mg       Quantity: 28 capsule    Refills: 0     No discharge procedures on file      PDMP Review     None          ED Provider  Electronically Signed by           Ruiz Georges DO  07/14/21 1799

## 2021-07-20 ENCOUNTER — APPOINTMENT (OUTPATIENT)
Dept: LAB | Facility: CLINIC | Age: 61
End: 2021-07-20
Payer: COMMERCIAL

## 2021-07-20 ENCOUNTER — OFFICE VISIT (OUTPATIENT)
Dept: FAMILY MEDICINE CLINIC | Facility: CLINIC | Age: 61
End: 2021-07-20
Payer: COMMERCIAL

## 2021-07-20 VITALS
OXYGEN SATURATION: 96 % | BODY MASS INDEX: 31.86 KG/M2 | HEART RATE: 70 BPM | DIASTOLIC BLOOD PRESSURE: 86 MMHG | HEIGHT: 67 IN | TEMPERATURE: 97.5 F | WEIGHT: 203 LBS | SYSTOLIC BLOOD PRESSURE: 132 MMHG | RESPIRATION RATE: 17 BRPM

## 2021-07-20 DIAGNOSIS — I10 ESSENTIAL HYPERTENSION: Primary | ICD-10-CM

## 2021-07-20 DIAGNOSIS — R21 RASH: ICD-10-CM

## 2021-07-20 PROCEDURE — 36415 COLL VENOUS BLD VENIPUNCTURE: CPT | Performed by: NURSE PRACTITIONER

## 2021-07-20 PROCEDURE — 86618 LYME DISEASE ANTIBODY: CPT | Performed by: NURSE PRACTITIONER

## 2021-07-20 PROCEDURE — 99213 OFFICE O/P EST LOW 20 MIN: CPT | Performed by: NURSE PRACTITIONER

## 2021-07-20 PROCEDURE — 3725F SCREEN DEPRESSION PERFORMED: CPT | Performed by: NURSE PRACTITIONER

## 2021-07-20 NOTE — PROGRESS NOTES
Assessment/Plan:           Problem List Items Addressed This Visit        Cardiovascular and Mediastinum    Essential hypertension - Primary      Other Visit Diagnoses     Rash        Relevant Orders    Lyme Total Antibody Profile with reflex to WB            Subjective:      Patient ID: Odessa Chacko is a 63 y o  male  Left foot cellulitis improving - swelling resolved, redness resolved, 1 more day on keflex     Patient is presenting pictures of a circular red rash on his left leg, he reports at one point it did resemble a Bull's eye rash, has a history of Lyme in the past with treatment  Will retest for Lyme and will order antibiotics- concerned of back to back antibiotic therapy so will hold for now- did not visualize the bull's eye rash     Hypertension  This is a chronic problem  The current episode started more than 1 month ago  The problem has been resolved since onset  The problem is controlled  Pertinent negatives include no chest pain, headaches, malaise/fatigue or PND  There are no associated agents to hypertension  Risk factors for coronary artery disease include obesity and stress  Past treatments include calcium channel blockers  The current treatment provides significant improvement  There are no compliance problems  The following portions of the patient's history were reviewed and updated as appropriate: allergies, current medications, past family history, past medical history, past surgical history and problem list     Review of Systems   Constitutional: Negative  Negative for malaise/fatigue  HENT: Negative  Eyes: Negative  Respiratory: Negative  Cardiovascular: Negative  Negative for chest pain and PND  Gastrointestinal: Negative  Endocrine: Negative  Genitourinary: Negative  Musculoskeletal: Negative  Skin: Negative  Allergic/Immunologic: Negative  Neurological: Negative  Negative for headaches  Hematological: Negative  Psychiatric/Behavioral: Negative  Objective:      /86 (BP Location: Left arm, Patient Position: Sitting, Cuff Size: Large)   Pulse 70   Temp 97 5 °F (36 4 °C) (Tympanic)   Resp 17   Ht 5' 7" (1 702 m)   Wt 92 1 kg (203 lb)   SpO2 96%   BMI 31 79 kg/m²          Physical Exam  Vitals and nursing note reviewed  Constitutional:       General: He is not in acute distress  Appearance: Normal appearance  He is not ill-appearing  HENT:      Head: Normocephalic and atraumatic  Nose: Nose normal  No congestion  Eyes:      General:         Right eye: No discharge  Left eye: No discharge  Extraocular Movements: Extraocular movements intact  Conjunctiva/sclera: Conjunctivae normal    Cardiovascular:      Rate and Rhythm: Normal rate and regular rhythm  Pulses: Normal pulses  Heart sounds: Normal heart sounds  No murmur heard  Pulmonary:      Effort: Pulmonary effort is normal       Breath sounds: Normal breath sounds  No wheezing, rhonchi or rales  Abdominal:      Palpations: Abdomen is soft  Tenderness: There is no right CVA tenderness or left CVA tenderness  Musculoskeletal:         General: No tenderness  Normal range of motion  Cervical back: Normal range of motion and neck supple  No rigidity or tenderness  Right lower leg: No edema  Left lower leg: No edema  Comments: Left foot without redness or warmth, trace swelling of toes "my normal"   Lymphadenopathy:      Cervical: No cervical adenopathy  Skin:     General: Skin is warm  Findings: Rash (left lateral lower leg- fine red [place] today, pictures of stages since occurrence) present  Neurological:      Mental Status: He is alert and oriented to person, place, and time  Psychiatric:         Mood and Affect: Mood normal          Behavior: Behavior normal          Thought Content:  Thought content normal          Judgment: Judgment normal          Allergies Allergen Reactions    Lisinopril Swelling    Ibuprofen Rash    Penicillins Rash     Other reaction(s): Rash

## 2021-07-21 ENCOUNTER — OFFICE VISIT (OUTPATIENT)
Dept: PODIATRY | Facility: CLINIC | Age: 61
End: 2021-07-21
Payer: COMMERCIAL

## 2021-07-21 ENCOUNTER — APPOINTMENT (OUTPATIENT)
Dept: RADIOLOGY | Facility: CLINIC | Age: 61
End: 2021-07-21
Payer: COMMERCIAL

## 2021-07-21 VITALS
WEIGHT: 204.2 LBS | BODY MASS INDEX: 32.05 KG/M2 | DIASTOLIC BLOOD PRESSURE: 85 MMHG | HEIGHT: 67 IN | HEART RATE: 64 BPM | SYSTOLIC BLOOD PRESSURE: 125 MMHG

## 2021-07-21 DIAGNOSIS — M19.90 INFLAMMATORY ARTHROPATHY: ICD-10-CM

## 2021-07-21 DIAGNOSIS — M25.475 SWELLING OF FOOT JOINT, LEFT: ICD-10-CM

## 2021-07-21 DIAGNOSIS — M25.475 SWELLING OF FOOT JOINT, LEFT: Primary | ICD-10-CM

## 2021-07-21 DIAGNOSIS — L03.116 CELLULITIS OF LEFT FOOT: ICD-10-CM

## 2021-07-21 PROCEDURE — 73630 X-RAY EXAM OF FOOT: CPT

## 2021-07-21 PROCEDURE — 99204 OFFICE O/P NEW MOD 45 MIN: CPT | Performed by: PODIATRIST

## 2021-07-21 NOTE — PROGRESS NOTES
PATIENT:  Alem Marlow  1960       ASSESSMENT:     1  Swelling of foot joint, left  XR foot 3+ vw left    Uric acid    Sedimentation rate, automated    C-reactive protein    Rheumatoid factor    BHAVESH   2  Inflammatory arthropathy  Uric acid    Sedimentation rate, automated    C-reactive protein    Rheumatoid factor    BHAVESH   3  Cellulitis of left foot               PLAN:  1  Patient was counseled and educated on the condition and the diagnosis  2  X-ray was obtained and personally reviewed  The radiological findings were discussed with the patient  3  Reviewed ED note  The diagnosis, treatment options and prognosis were discussed with the patient  4  He has acute inflammation in his left foot around lesser MTPJs  It could be due to infectious process vs inflammatory process of joint  Leaning toward to inflammatory arthropathy  5  Will order blood work including uric acid, ESR, CPR, RF, and BHAVESH  6  Instructed supportive care, icing, and proper footwear/ arch support  Low purine diet for precaution  7  RA in 2 weeks  Subjective:     HPI  The patient was referred to my office for cellulitis of left foot  He woke up with redness and swelling in left forefoot 1 week ago  He went to ED and was diagnosed with cellulitis  He was put on oral antibiotics  It resolved after about 2 days  His pain was about 5 out of 10, but resolved at this time  Denies any wound in his foot  He also has arthritis, especially in his fingers with swelling, deformity, and stiffness  He denied any history of diabetes  No associated numbness or paresthesia  No significant weakness  The following portions of the patient's history were reviewed and updated as appropriate: allergies, current medications, past family history, past medical history, past social history, past surgical history and problem list   All pertinent labs and images were reviewed        Past Medical History  Past Medical History:   Diagnosis Date    Arthritis     Fingers, lower back ,right hip    Chronic pain     CPAP (continuous positive airway pressure) dependence     GERD (gastroesophageal reflux disease)     Hemangioma of skin     Hypertension     Irritable bowel syndrome     Lumbar spondylolysis     Multiple pigmented nevi     Obstructive sleep apnea syndrome     PONV (postoperative nausea and vomiting)     Premature atrial beats     Premature ventricular beats     Rectus diastasis     Sleep apnea     Umbilical hernia        Past Surgical History  Past Surgical History:   Procedure Laterality Date    HERNIA REPAIR      KNEE ARTHROSCOPY Right     [place] REPAIR UMBILICAL LXSL,6+Z/A,CTVYU N/A 12/12/2017    Procedure: OPEN UMBILICAL HERNIA REPAIR WITH MESH;  Surgeon: Иван Mari MD;  Location:  MAIN OR;  Service: General    WISDOM TOOTH EXTRACTION Left         Allergies:  Lisinopril, Ibuprofen, and Penicillins    Medications:  Current Outpatient Medications   Medication Sig Dispense Refill    amLODIPine (NORVASC) 10 mg tablet Take 1 tablet (10 mg total) by mouth daily 90 tablet 1    cephalexin (KEFLEX) 500 mg capsule Take 1 capsule (500 mg total) by mouth every 6 (six) hours for 7 days 28 capsule 0    clotrimazole (LOTRIMIN) 1 % cream Apply topically 2 (two) times a day 30 g 2    meloxicam (MOBIC) 15 mg tablet Take 1 tablet (15 mg total) by mouth daily 90 tablet 1    pantoprazole (PROTONIX) 40 mg tablet Take 40 mg by mouth daily       No current facility-administered medications for this visit         Social History:  Social History     Socioeconomic History    Marital status: /Civil Union     Spouse name: None    Number of children: None    Years of education: None    Highest education level: None   Occupational History    None   Tobacco Use    Smoking status: Never Smoker    Smokeless tobacco: Never Used   Vaping Use    Vaping Use: Never used   Substance and Sexual Activity    Alcohol use: Yes     Alcohol/week: 4 0 standard drinks     Types: 4 Cans of beer per week    Drug use: No    Sexual activity: Yes     Partners: Female     Birth control/protection: None   Other Topics Concern    None   Social History Narrative    None     Social Determinants of Health     Financial Resource Strain: Low Risk     Difficulty of Paying Living Expenses: Not hard at all   Food Insecurity: No Food Insecurity    Worried About Running Out of Food in the Last Year: Never true    Mireille of Food in the Last Year: Never true   Transportation Needs: No Transportation Needs    Lack of Transportation (Medical): No    Lack of Transportation (Non-Medical): No   Physical Activity: Sufficiently Active    Days of Exercise per Week: 3 days    Minutes of Exercise per Session: 60 min   Stress: Stress Concern Present    Feeling of Stress : To some extent   Social Connections: Socially Integrated    Frequency of Communication with Friends and Family: More than three times a week    Frequency of Social Gatherings with Friends and Family: Once a week    Attends [employer] Services: More than 4 times per year    Active Member of [employer] or Organizations: Yes    Attends Club or Organization Meetings: More than 4 times per year    Marital Status:    Intimate Partner Violence: Not At Risk    Fear of Current or Ex-Partner: No    Emotionally Abused: No    Physically Abused: No    Sexually Abused: No          Review of Systems   Constitutional: Negative for appetite change, chills and fever  HENT: Negative for sore throat  Respiratory: Negative for cough and shortness of breath  Cardiovascular: Negative for chest pain and leg swelling  Gastrointestinal: Negative for diarrhea, nausea and vomiting  Musculoskeletal: Negative for gait problem  Skin: Negative for rash and wound  Allergic/Immunologic: Negative for immunocompromised state  Neurological: Negative for weakness and numbness  Hematological: Negative  Psychiatric/Behavioral: Negative for behavioral problems and confusion  Objective:      /85   Pulse 64   Ht 5' 7" (1 702 m) Comment: verbal  Wt 92 6 kg (204 lb 3 2 oz)   BMI 31 98 kg/m²          Physical Exam  Vitals reviewed  Constitutional:       General: He is not in acute distress  Appearance: Normal appearance  He is not ill-appearing or toxic-appearing  HENT:      Head: Normocephalic and atraumatic  Eyes:      Extraocular Movements: Extraocular movements intact  Cardiovascular:      Rate and Rhythm: Normal rate and regular rhythm  Pulses: Normal pulses  Dorsalis pedis pulses are 2+ on the right side and 2+ on the left side  Posterior tibial pulses are 2+ on the right side and 2+ on the left side  Pulmonary:      Effort: Pulmonary effort is normal  No respiratory distress  Musculoskeletal:         General: No swelling, tenderness or signs of injury  Normal range of motion  Cervical back: Normal range of motion and neck supple  Right lower leg: No edema  Left lower leg: No edema  Right foot: No foot drop  Left foot: No foot drop  Comments: No redness, warmth, edema bilateral feet  Skin:     General: Skin is warm and moist       Capillary Refill: Capillary refill takes less than 2 seconds  Coloration: Skin is not cyanotic or mottled  Findings: No abscess, ecchymosis, erythema, rash or wound  Nails: There is no clubbing  Comments: No significant tinea pedis  Neurological:      General: No focal deficit present  Mental Status: He is alert and oriented to person, place, and time  Cranial Nerves: No cranial nerve deficit  Sensory: No sensory deficit  Motor: No weakness  Coordination: Coordination normal       Gait: Gait normal    Psychiatric:         Mood and Affect: Mood normal          Behavior: Behavior normal          Thought Content:  Thought content normal          Judgment: Judgment normal

## 2021-07-22 LAB — B BURGDOR IGG+IGM SER-ACNC: 22

## 2021-07-30 ENCOUNTER — APPOINTMENT (OUTPATIENT)
Dept: LAB | Facility: CLINIC | Age: 61
End: 2021-07-30
Payer: COMMERCIAL

## 2021-07-30 DIAGNOSIS — M19.90 INFLAMMATORY ARTHROPATHY: ICD-10-CM

## 2021-07-30 DIAGNOSIS — M25.475 SWELLING OF FOOT JOINT, LEFT: ICD-10-CM

## 2021-07-30 LAB
CRP SERPL QL: <3 MG/L
ERYTHROCYTE [SEDIMENTATION RATE] IN BLOOD: 3 MM/HOUR (ref 0–19)
URATE SERPL-MCNC: 7.1 MG/DL (ref 4.2–8)

## 2021-07-30 PROCEDURE — 36415 COLL VENOUS BLD VENIPUNCTURE: CPT

## 2021-07-30 PROCEDURE — 86038 ANTINUCLEAR ANTIBODIES: CPT

## 2021-07-30 PROCEDURE — 85652 RBC SED RATE AUTOMATED: CPT

## 2021-07-30 PROCEDURE — 84550 ASSAY OF BLOOD/URIC ACID: CPT

## 2021-07-30 PROCEDURE — 86140 C-REACTIVE PROTEIN: CPT

## 2021-07-30 PROCEDURE — 86430 RHEUMATOID FACTOR TEST QUAL: CPT

## 2021-08-02 LAB
RHEUMATOID FACT SER QL LA: NEGATIVE
RYE IGE QN: NEGATIVE

## 2021-08-04 ENCOUNTER — APPOINTMENT (OUTPATIENT)
Dept: RADIOLOGY | Facility: CLINIC | Age: 61
End: 2021-08-04
Payer: COMMERCIAL

## 2021-08-04 ENCOUNTER — OFFICE VISIT (OUTPATIENT)
Dept: PODIATRY | Facility: CLINIC | Age: 61
End: 2021-08-04
Payer: COMMERCIAL

## 2021-08-04 VITALS
SYSTOLIC BLOOD PRESSURE: 123 MMHG | BODY MASS INDEX: 32.02 KG/M2 | DIASTOLIC BLOOD PRESSURE: 82 MMHG | HEART RATE: 70 BPM | HEIGHT: 67 IN | WEIGHT: 204 LBS

## 2021-08-04 DIAGNOSIS — M25.475 SWELLING OF FOOT JOINT, LEFT: ICD-10-CM

## 2021-08-04 DIAGNOSIS — M19.90 INFLAMMATORY ARTHROPATHY: Primary | ICD-10-CM

## 2021-08-04 PROCEDURE — 73630 X-RAY EXAM OF FOOT: CPT

## 2021-08-04 PROCEDURE — 3074F SYST BP LT 130 MM HG: CPT | Performed by: PODIATRIST

## 2021-08-04 PROCEDURE — 3079F DIAST BP 80-89 MM HG: CPT | Performed by: PODIATRIST

## 2021-08-04 PROCEDURE — 99213 OFFICE O/P EST LOW 20 MIN: CPT | Performed by: PODIATRIST

## 2021-08-04 PROCEDURE — 1036F TOBACCO NON-USER: CPT | Performed by: PODIATRIST

## 2021-08-04 PROCEDURE — 3008F BODY MASS INDEX DOCD: CPT | Performed by: PODIATRIST

## 2021-08-04 NOTE — PATIENT INSTRUCTIONS
Low Purine Diet   WHAT YOU NEED TO KNOW:   What is a low-purine diet? A low-purine diet is a meal plan based on foods that are low in purine content  Purine is a substance that is found in foods and is produced naturally by the body  Purines are broken down by the body and changed to uric acid  The kidneys normally filter the uric acid, and it leaves the body through the urine  However, people with gout sometimes have a buildup of uric acid in the blood  This buildup of uric acid can cause swelling and pain (a gout attack)  A low-purine diet may help to treat and prevent gout attacks  What foods can I include? The following foods are low in purine  · Eggs, nuts, and peanut butter    · Low-fat and fat free cheese and ice cream    · Skim or 1% milk    · Soup made without meat extract or broth    · Vegetables that are not on the medium-purine list below    · All fruit and fruit juices    · Bread, pasta, rice, cake, cornbread, and popcorn    · Water, soda, tea, coffee, and cocoa    · Sugar, sweets, and gelatin    · Fat and oil    What foods should I limit? · Medium-purine foods:      ? Meats:  Limit the following to 4 to 6 ounces each day  § Meat and [employer], lobster, oysters, and shrimp    ? Vegetables:  Limit the following vegetables to ½ cup each day  § Asparagus    § Cauliflower    § Spinach    § Mushrooms    § Green peas    ? Beans, peas, and lentils (limit to 1 cup each day)    ? Oats and oatmeal (limit to ? cup uncooked each day)    ? Wheat germ and bran (limit to ¼ cup each day)    · High-purine foods:  Limit or avoid foods high in purine  ? Anchovies, sardines, scallops, and mussels    ? Tuna, codfish, herring, and greg    ? [employer], like goose and duck    ? Organ meats, such as brains, heart, kidney, liver, and sweetbreads    ? Gravies and sauces made with meat    ? Yeast extracts taken in the form of a supplement    What other guidelines should I follow?    · Increase liquid intake  Drink 8 to 16 (eight-ounce) cups of liquid each day  At least half of the liquid you drink should be water  Liquid can help your body get rid of extra uric acid  · Limit or avoid alcohol  Alcohol (especially beer) increases your risk of a gout attack  Beer contains a high amount of purine  · Maintain a healthy weight  If you are overweight, you should lose weight slowly  Weight loss can help decrease the amount of stress on your joints  Regular exercise can help you lose weight if you are overweight, or maintain your weight if you are at a normal weight  Talk to your healthcare provider before you begin an exercise program     CARE AGREEMENT:   You have the right to help plan your care  Discuss treatment options with your healthcare provider to decide what care you want to receive  You always have the right to refuse treatment  The above information is an  only  It is not intended as medical advice for individual conditions or treatments  Talk to your doctor, nurse or pharmacist before following any medical regimen to see if it is safe and effective for you  © Copyright "[employer] 2021 Information is for End User's use only and may not be sold, redistributed or otherwise used for commercial purposes   All illustrations and images included in CareNotes® are the copyrighted property of [employer]  or [address] [place] [address] [facility]

## 2021-08-04 NOTE — PROGRESS NOTES
PATIENT:  Reddy Yusuf  1960       ASSESSMENT:     1  Inflammatory arthropathy     2  Swelling of foot joint, left  XR foot 3+ vw left             PLAN:  1  Patient was counseled and educated on the condition and the diagnosis  2  X-ray was obtained to rule out stress fracture  X-ray was personally reviewed  The radiological findings were discussed with the patient  There is no evidence of stress fracture  3  Reviewed blood work  Possible repeat Uric acid level if he has any flare up in the next few weeks to months  He will call the office immediately with any symptom  Continue low purine diet for pre-caution  Subjective:     HPI  The patient presents for left foot evaluation and follow-up on blood work  Pain is minimal   Decreased edema  No redness  No recurring pain  The following portions of the patient's history were reviewed and updated as appropriate: allergies, current medications, past family history, past medical history, past social history, past surgical history and problem list   All pertinent labs and images were reviewed        Past Medical History  Past Medical History:   Diagnosis Date    Arthritis     Fingers, lower back ,right hip    Chronic pain     CPAP (continuous positive airway pressure) dependence     GERD (gastroesophageal reflux disease)     Hemangioma of skin     Hypertension     Irritable bowel syndrome     Lumbar spondylolysis     Multiple pigmented nevi     Obstructive sleep apnea syndrome     PONV (postoperative nausea and vomiting)     Premature atrial beats     Premature ventricular beats     Rectus diastasis     Sleep apnea     Umbilical hernia        Past Surgical History  Past Surgical History:   Procedure Laterality Date    HERNIA REPAIR      KNEE ARTHROSCOPY Right     OH REPAIR UMBILICAL ACUZ,0+I/E,WPDIY N/A 12/12/2017    Procedure: OPEN UMBILICAL HERNIA REPAIR WITH MESH;  Surgeon: Laci Camejo MD;  Location: [facility] OR;  Service: General    WISDOM TOOTH EXTRACTION Left         Allergies:  Lisinopril, Ibuprofen, and Penicillins    Medications:  Current Outpatient Medications   Medication Sig Dispense Refill    amLODIPine (NORVASC) 10 mg tablet Take 1 tablet (10 mg total) by mouth daily 90 tablet 1    clotrimazole (LOTRIMIN) 1 % cream Apply topically 2 (two) times a day 30 g 2    meloxicam (MOBIC) 15 mg tablet Take 1 tablet (15 mg total) by mouth daily 90 tablet 1    pantoprazole (PROTONIX) 40 mg tablet Take 40 mg by mouth daily       No current facility-administered medications for this visit  Social History:  Social History     Socioeconomic History    Marital status: /Civil Union     Spouse name: None    Number of children: None    Years of education: None    Highest education level: None   Occupational History    None   Tobacco Use    Smoking status: Never Smoker    Smokeless tobacco: Never Used   Vaping Use    Vaping Use: Never used   Substance and Sexual Activity    Alcohol use: Yes     Alcohol/week: 4 0 standard drinks     Types: 4 Cans of beer per week    Drug use: No    Sexual activity: Yes     Partners: Female     Birth control/protection: None   Other Topics Concern    None   Social History Narrative    None     Social Determinants of Health     Financial Resource Strain: Low Risk     Difficulty of Paying Living Expenses: Not hard at all   Food Insecurity: No Food Insecurity    Worried About Running Out of Food in the Last Year: Never true    Mireille of Food in the Last Year: Never true   Transportation Needs: No Transportation Needs    Lack of Transportation (Medical): No    Lack of Transportation (Non-Medical): No   Physical Activity: Sufficiently Active    Days of Exercise per Week: 3 days    Minutes of Exercise per Session: 60 min   Stress: Stress Concern Present    Feeling of Stress :  To some extent   Social Connections: Socially Integrated    Frequency of Communication with Friends and Family: More than three times a week    Frequency of Social Gatherings with Friends and Family: Once a week    Attends Mandae[employer] Services: More than 4 times per year    Active Member of [employer] or Organizations: Yes    Attends Club or Organization Meetings: More than 4 times per year    Marital Status:    Intimate Partner Violence: Not At Risk    Fear of Current or Ex-Partner: No    Emotionally Abused: No    Physically Abused: No    Sexually Abused: No          Review of Systems   Constitutional: Negative for appetite change, chills and fever  Respiratory: Negative for cough and shortness of breath  Cardiovascular: Negative for chest pain and leg swelling  Gastrointestinal: Negative for diarrhea, nausea and vomiting  Musculoskeletal: Negative for gait problem  Objective:      /82   Pulse 70   Ht 5' 7" (1 702 m) Comment: verbal  Wt 92 5 kg (204 lb)   BMI 31 95 kg/m²          Physical Exam  Vitals reviewed  Constitutional:       General: He is not in acute distress  Appearance: Normal appearance  He is not ill-appearing or toxic-appearing  Cardiovascular:      Rate and Rhythm: Normal rate and regular rhythm  Pulses: Normal pulses  Dorsalis pedis pulses are 2+ on the right side and 2+ on the left side  Posterior tibial pulses are 2+ on the right side and 2+ on the left side  Pulmonary:      Effort: Pulmonary effort is normal  No respiratory distress  Musculoskeletal:         General: No swelling, tenderness or signs of injury  Normal range of motion  Right lower leg: No edema  Left lower leg: No edema  Right foot: No foot drop  Left foot: No foot drop  Comments: Mild fullness around lesser MPJs left foot  ROM intact without pain  Skin:     General: Skin is warm and moist       Capillary Refill: Capillary refill takes less than 2 seconds        Coloration: Skin is not cyanotic or mottled  Findings: No abscess, ecchymosis, erythema, rash or wound  Nails: There is no clubbing  Neurological:      General: No focal deficit present  Mental Status: He is alert and oriented to person, place, and time  Cranial Nerves: No cranial nerve deficit  Sensory: No sensory deficit  Motor: No weakness  Coordination: Coordination normal       Gait: Gait normal    Psychiatric:         Mood and Affect: Mood normal          Behavior: Behavior normal          Thought Content:  Thought content normal          Judgment: Judgment normal

## 2021-08-13 ENCOUNTER — APPOINTMENT (OUTPATIENT)
Dept: NON INVASIVE DIAGNOSTICS | Age: 61
End: 2021-08-13
Payer: COMMERCIAL

## 2021-08-13 ENCOUNTER — HOSPITAL ENCOUNTER (OUTPATIENT)
Dept: NON INVASIVE DIAGNOSTICS | Age: 61
Discharge: HOME | End: 2021-08-13
Payer: COMMERCIAL

## 2021-08-13 DIAGNOSIS — R00.2 PALPITATIONS: ICD-10-CM

## 2021-08-13 DIAGNOSIS — I10 ESSENTIAL HYPERTENSION: ICD-10-CM

## 2021-08-13 PROCEDURE — 93306 TTE W/DOPPLER COMPLETE: CPT

## 2021-08-13 PROCEDURE — 93306 TTE W/DOPPLER COMPLETE: CPT | Performed by: INTERNAL MEDICINE

## 2021-08-18 ENCOUNTER — TELEPHONE (OUTPATIENT)
Dept: FAMILY MEDICINE CLINIC | Facility: CLINIC | Age: 61
End: 2021-08-18

## 2021-08-18 DIAGNOSIS — Q23.1 BICUSPID AORTIC VALVE DETERMINED BY IMAGING: ICD-10-CM

## 2021-08-18 DIAGNOSIS — I77.819 AORTIC DILATATION (HCC): Primary | ICD-10-CM

## 2021-08-18 PROBLEM — Q23.81 BICUSPID AORTIC VALVE DETERMINED BY IMAGING: Status: ACTIVE | Noted: 2021-08-18

## 2021-08-18 NOTE — TELEPHONE ENCOUNTER
I called and spoke with the patient on 8/18/2021 and reviewed the results of his echocardiogram   It demonstrates that he actually has a bicuspid aortic valve with some aortic dilatation  There is no insufficiency  I advised him that I would like to refer him to Cardiology for further evaluation and workup  Everything else on the echo looked normal   He will schedule this in the near future

## 2021-09-02 ENCOUNTER — TELEPHONE (OUTPATIENT)
Dept: OBGYN CLINIC | Facility: HOSPITAL | Age: 61
End: 2021-09-02

## 2021-09-02 NOTE — TELEPHONE ENCOUNTER
I spoke with patient and advised he should follow the plan of care above and we will check in with him on Monday to see how he is doing and if still having worsening of symptoms we will request sooner appt to see Dr Robin Scott  Patient verbalized understanding

## 2021-09-02 NOTE — TELEPHONE ENCOUNTER
Opal Brar- can you see this Dr Montalvo Slice patient for knee locking up? He has applied his brace and doing RICE method, taking meloxicam   Please advise

## 2021-09-02 NOTE — TELEPHONE ENCOUNTER
Patient sees Dr Dexter Cheung  He is calling stating that his knee is locked up and he is unsure what to do  He is scheduled to see Dr Dexter Cheung on 9/24  The doctor is currently out of the office  He is asking for a call back

## 2021-09-07 ENCOUNTER — TELEPHONE (OUTPATIENT)
Dept: OBGYN CLINIC | Facility: MEDICAL CENTER | Age: 61
End: 2021-09-07

## 2021-09-07 NOTE — TELEPHONE ENCOUNTER
Patient called in to stated he would like to keep his appointment for 9/24/2021, he is feeling better

## 2021-09-08 ENCOUNTER — HOSPITAL ENCOUNTER (OUTPATIENT)
Dept: NON INVASIVE DIAGNOSTICS | Age: 61
Discharge: HOME | End: 2021-09-08
Payer: COMMERCIAL

## 2021-09-08 DIAGNOSIS — I10 ESSENTIAL HYPERTENSION: ICD-10-CM

## 2021-09-08 DIAGNOSIS — R00.2 PALPITATIONS: ICD-10-CM

## 2021-09-08 PROCEDURE — 93226 XTRNL ECG REC<48 HR SCAN A/R: CPT

## 2021-09-08 PROCEDURE — 93225 XTRNL ECG REC<48 HRS REC: CPT

## 2021-09-10 ENCOUNTER — CONSULT (OUTPATIENT)
Dept: CARDIOLOGY CLINIC | Facility: CLINIC | Age: 61
End: 2021-09-10
Payer: COMMERCIAL

## 2021-09-10 VITALS
WEIGHT: 204.8 LBS | HEART RATE: 64 BPM | DIASTOLIC BLOOD PRESSURE: 78 MMHG | HEIGHT: 67 IN | SYSTOLIC BLOOD PRESSURE: 130 MMHG | BODY MASS INDEX: 32.15 KG/M2

## 2021-09-10 DIAGNOSIS — Q23.1 AORTOPATHY ASSOCIATED WITH BICUSPID AORTIC VALVE: ICD-10-CM

## 2021-09-10 DIAGNOSIS — Q23.1 BICUSPID AORTIC VALVE: ICD-10-CM

## 2021-09-10 DIAGNOSIS — Q24.9 ADULT CONGENITAL HEART DISEASE: ICD-10-CM

## 2021-09-10 DIAGNOSIS — I10 ESSENTIAL HYPERTENSION: Primary | ICD-10-CM

## 2021-09-10 PROBLEM — Q23.81: Status: ACTIVE | Noted: 2021-08-18

## 2021-09-10 PROCEDURE — 99244 OFF/OP CNSLTJ NEW/EST MOD 40: CPT | Performed by: INTERNAL MEDICINE

## 2021-09-10 PROCEDURE — 93000 ELECTROCARDIOGRAM COMPLETE: CPT | Performed by: INTERNAL MEDICINE

## 2021-09-10 RX ORDER — MULTIVIT-MIN/IRON FUM/FOLIC AC 7.5 MG-4
1 TABLET ORAL DAILY
COMMUNITY
End: 2021-11-24 | Stop reason: ALTCHOICE

## 2021-09-10 RX ORDER — CHLORTHALIDONE 25 MG/1
12.5 TABLET ORAL DAILY
Qty: 30 TABLET | Refills: 6 | Status: SHIPPED | OUTPATIENT
Start: 2021-09-10

## 2021-09-10 NOTE — PATIENT INSTRUCTIONS
Thoracic Aortic Aneurysm   WHAT YOU NEED TO KNOW:   What is a thoracic aortic aneurysm? A thoracic aortic aneurysm (TAA) is a bulge in your aorta that occurs when the aorta's walls are weakened  The aorta is a large blood vessel that extends from your heart down the center of your chest and into your abdomen  What causes a TAA? · Atherosclerosis: This is hardening of the arteries  Hardening occurs when fatty deposits, called plaque, build up along the walls of your arteries  · Abnormal development:  A part of your heart and aorta may not have developed normally  · Inflammation:  You may have inflammation in the walls of your aorta from an inflammatory disease or certain infections, such as syphilis  · Loss of elasticity:  As you age, blood vessels in the body may lose some of their elasticity  It may be worsened by long-term increased blood pressure  Certain disorders also can make the walls of your arteries lose elasticity  · Trauma:  Injury, particularly on the chest area, may lead to a partial or complete cut in the aorta  What increases my risk of a TAA? · Cigarette smoking    · High blood pressure    · A close family member has had a TAA    · Being male and older than 72years of age    · Diabetes     · Being overweight    What are the signs and symptoms of a TAA? You may have no signs or symptoms  The following are common when signs and symptoms do occur:  · Chest, neck, back, or abdominal pain    · Cough or blood in sputum    · Hoarseness    · Trouble breathing, which may be affected by position    · Trouble swallowing    · Wheezing    How is a TAA diagnosed? You may need more than one of the following tests  You may be given contrast dye before some tests to help the pictures show up better  Tell the healthcare provider if you have ever had an allergic reaction to contrast dye  · X-rays: These show your lungs, heart, aorta, and other parts around them   An x-ray of the aorta with dye is called an aortogram or aortography  · CT scan: This test is also called a CAT scan  An x-ray machine uses a computer to take pictures of your chest and blood vessels  The pictures may show a TAA  · MRI:  This scan uses powerful magnets and a computer to take pictures of your chest and blood vessels  An MRI may show a TAA  Do not enter the MRI room with anything metal  Metal can cause serious injury  Tell the healthcare provider if you have any metal in or on your body  · Transesophageal echocardiogram:      ? A transesophageal echocardiogram (CHRISTY) is a type of ultrasound that shows pictures of the size and shape of your heart  It also looks at how your heart moves when it is beating  These pictures are seen on a TV-like screen  You may need a CHRISTY if your heart does not show up very well in a regular echocardiogram  You may also need a CHRISTY to check for certain problems such as blood clots or infection inside the heart  ? You will be given medicine to relax you during a CHRISTY  Healthcare providers put a tube in your mouth that is moved down into your esophagus (food pipe)  The tube has a small ultrasound sensor on the end  Since your esophagus is right next to your heart, your healthcare provider can see your heart clearly  How is a TAA treated? · Medicines: You may be given blood pressure or cholesterol medicine to help stop your TAA from growing  · Repair:  Healthcare providers may cut out the aneurysm and replace the cut area with an artificial tube  If the aortic valve (flap) also has a problem, it may also be replaced  · Stenting:  A stent (tube) may be put in the portion of the aorta with aneurysm  The stent may strengthen your aorta and reduce pressure on the wall of your aorta  What are the risks of a TAA? Surgery may damage to your spinal cord, or cause you to bleed or get an infection  Too much blood loss can cause your organs to fail, such as your kidneys   You may need to have surgery again  If untreated, TAA may cause more serious problems  The aneurysm may get larger, burst, and be life-threatening  How can I manage my symptoms? · Check your blood pressure as directed:  Keep a record of your blood pressure and bring it with you to follow-up visits  Ask your healthcare provider what your blood pressure should be and how to check it  High blood pressure can make your aneurysm worse  · Exercise:  Ask your healthcare provider to help you create an exercise plan  This may help lower blood pressure  · Eat a variety of healthy foods:  Healthy foods include fruits, vegetables, whole-grain breads, low-fat dairy products, beans, lean meats, and fish  Ask if you need to be on a special diet  · Do not smoke: If you smoke, it is never too late to quit  Smoking increases your risk for TAA and heart disease  Ask your healthcare provider for information if you need help quitting  When should I contact my healthcare provider? · You have a fever  · You have new symptoms since your last appointment  · Your symptoms prevent you from doing your daily activities  · You have questions or concerns about your condition or care  When should I seek immediate care or call 911? · You have nausea and are vomiting  · You have sudden chest, neck, back, or abdominal pain  · Your skin becomes pale and sweaty, or you feel very weak  · You are dizzy, or you faint  CARE AGREEMENT:   You have the right to help plan your care  Learn about your health condition and how it may be treated  Discuss treatment options with your healthcare providers to decide what care you want to receive  You always have the right to refuse treatment  The above information is an  only  It is not intended as medical advice for individual conditions or treatments   Talk to your doctor, nurse or pharmacist before following any medical regimen to see if it is safe and effective for you   © Copyright [employer] 2021 Information is for End User's use only and may not be sold, redistributed or otherwise used for commercial purposes   All illustrations and images included in CareNotes® are the copyrighted property of A D A M , Inc  or Susana Jones

## 2021-09-10 NOTE — PROGRESS NOTES
[facility]  Office Consultation  Carole Velazquez 63 y o  male MRN: [id number]        Chief Complaint    Chief Complaint   Patient presents with    Advice Only     referred Dr Antoine Guzmán Palpitations     random    Edema     both ankles        Referring Provider: Mahesh Roberts DO    Impression & Plan:    1  Bicuspid aortic valve  - MRI cardiac  w wo contrast; Future    2  Aortopathy associated with bicuspid aortic valve  - chlorthalidone 25 mg tablet; Take 0 5 tablets (12 5 mg total) by mouth daily  Dispense: 30 tablet; Refill: 6  - MRI cardiac  w wo contrast; Future    3  Essential hypertension  - POCT ECG  - chlorthalidone 25 mg tablet; Take 0 5 tablets (12 5 mg total) by mouth daily  Dispense: 30 tablet; Refill: 6  - Basic metabolic panel; Future    4  Adult congenital heart disease      Mr Carrol Sandoval has AHA/ACC AP Class IIC adult congenital heart disease with a bicuspid aortic valve and associated aortopathy by echocardiogram   He is NYHA class I by symptoms  He is euvolemic without evidence of heart failure  His aortic valve is bicuspid with an obstructive index of 0 54 and a calculated area of 1 8-2 0 cm2  He has no symptoms of valvular heart failure or aortic stenosis  He has aortic root dilation of 4 5 cm and ascending aorta of 4 8 cm  We discussed the importance of tight blood pressure control given his aortopathy and we will be adding chlorthalidone 12 5 mg daily to his amlodipine 10 mg for additional BP control  We will perform a cardiac MRI to assess his aorta more accurately and correlate the measured dimensions on MR to the dimensions from echocardiography  He currently does not meet any guideline criteria for intervention to his aortic valve or aorta    He is at low risk for dissection, and his threshold for primarily intervening on his aorta would be 5 5 cm per the latest ACC/AHA Guidelines (2020 AHA/ACC Valve guidelines), or 4 5 cm should he be undergoing surgery for aortic valve replacement, which is not indicated at this time either  We will see Concha Jasso back in 6 months for routine follow-up  HPI: Concha Jasso is a 63y o  year old male presenting to establish care for recently diagnosed bicuspid aortic valve  He had an echocardiogram performed 8/13/21 in the setting of PVCs and PACs  He has, in the past, had Holter monitors (none in our system) which have shown "quite a few" premature beats  Therefore, his PCP has been monitoring him with surveillance testing  Most recently, a Holter was performed on 9/8/21 to quantify his premature beats  In regards to the aortic valve, he has had no valvular heart symptoms his whole life  He plays a lot of sports and exercises  His exercise is more recently limited by knee problems  He has no cardiac symptoms other than the occasional palpitations  No SOB, RICHARDSON, orthopnea, dizziness, presyncope, lightheadedness, chest pain or chest pressure  Review of Systems   Constitutional: Negative for activity change and unexpected weight change  HENT: Negative for facial swelling  Eyes: Negative for visual disturbance  Respiratory: Negative for cough and chest tightness  Cardiovascular: Negative for chest pain  Gastrointestinal: Negative for blood in stool  Musculoskeletal: Negative for myalgias  Skin: Negative for pallor  Allergic/Immunologic: Negative for immunocompromised state  Neurological: Negative for syncope and light-headedness  Psychiatric/Behavioral: Negative for agitation and hallucinations           Past Medical History:   Diagnosis Date    Arthritis     Fingers, lower back ,right hip    Chronic pain     CPAP (continuous positive airway pressure) dependence     GERD (gastroesophageal reflux disease)     Hemangioma of skin     Hypertension     Irritable bowel syndrome     Lumbar spondylolysis     Multiple pigmented nevi     Obstructive sleep apnea syndrome     PONV (postoperative nausea and vomiting)     Premature atrial beats     Premature ventricular beats     Rectus diastasis     Sleep apnea     Umbilical hernia      Past Surgical History:   Procedure Laterality Date    HERNIA REPAIR      KNEE ARTHROSCOPY Right     VT REPAIR UMBILICAL YLDD,2+D/G,KPNFD N/A 12/12/2017    Procedure: OPEN UMBILICAL HERNIA REPAIR WITH MESH;  Surgeon: Anastasiia Hernandez MD;  Location:  MAIN OR;  Service: General    WISDOM TOOTH EXTRACTION Left      Social History     Substance and Sexual Activity   Alcohol Use Yes    Alcohol/week: 4 0 standard drinks    Types: 4 Cans of beer per week     Social History     Substance and Sexual Activity   Drug Use No     Social History     Tobacco Use   Smoking Status Never Smoker   Smokeless Tobacco Never Used     Family History   Problem Relation Age of Onset    Rheum arthritis Mother     Osteoarthritis Mother     Thrombocytopenia Mother     Heart disease Father     Hypertension Father     No Known Problems Sister     No Known Problems Brother        Allergies: Allergies   Allergen Reactions    Lisinopril Swelling    Ibuprofen Rash    Penicillins Rash     Other reaction(s): Rash       Medications (as of START of this encounter): Outpatient Medications Prior to Visit   Medication Sig Dispense Refill    amLODIPine (NORVASC) 10 mg tablet Take 1 tablet (10 mg total) by mouth daily 90 tablet 1    clotrimazole (LOTRIMIN) 1 % cream Apply topically 2 (two) times a day 30 g 2    meloxicam (MOBIC) 15 mg tablet Take 1 tablet (15 mg total) by mouth daily 90 tablet 1    Multiple Vitamins-Minerals (multivitamin with minerals) tablet Take 1 tablet by mouth daily      pantoprazole (PROTONIX) 40 mg tablet Take 40 mg by mouth daily       No facility-administered medications prior to visit           Vitals:    09/10/21 0838   BP: 130/78   Pulse: 64     Weight (last 2 days)     Date/Time   Weight    09/10/21 0838   92 9 (204 8) General: Jaden Art is a well appearing male, in no acute distress, sitting comfortably  HEENT: moist mucous membranes, EOMI  Neck:  No JVD, supple, trachea midline   Cardiovascular: regular rate and rhythm  Pulmonary: normal respiratory effort  Abdomen: soft and nondistended  Extremities: trace lower extremity edema  Warm and well perfused extremities  Neuro: no focal motor deficits, AAOx3 (person, place, time)  Psych: Normal mood and affect, cooperative      Laboratory Studies:    Laboratory studies personally reviewed    Cardiac testing:     His echocardiogram on 08/13/2021 showed normal LV size and function, LVEF 60%  Mildly dilated RV with normal systolic function  His aortic valve was bicuspid, with mildly increased leaflet thickness and a flow velocity V max of 2 m/sec  The calculated gradient, mean, 9 mmHg  The calculated aortic valve area was 1 8-2 0 cm2 with an obstructive index of 0 54  There was no significant aortic insufficiency  There was dilation of the aortic root at 4 5 cm and dilation of the ascending aorta of 4 8 cm  EKG reviewed personally:   ECG today shows normal sinus rhythm, 64 bpm, normal axis, normal intervals  Normal study  Time Spent:  Total time (face-to-face and non-face-to-face) spent on today's visit was 50 minutes  This includes preparation for the visits (i e  reviewing test results), performance of a medically appropriate history and examination, and orders for medications, tests or other procedures  This time is exclusive of procedures performed and time spent teaching  Marcos Deshpande MD    This note was completed in part utilizing [employer] fluency direct voice recognition software  Grammatical errors, random word insertion, spelling mistakes, occasional wrong word or "sound-alike" substitutions and incomplete sentences may be an occasional consequence of the system secondary to software limitations, ambient noise and hardware issues   At the time of dictation, efforts were made to edit, clarify and /or correct errors  Please read the chart carefully and recognize, using context, where substitutions have occurred  If you have any questions or concerns about the context, text or information contained within the body of this dictation, please contact myself, the provider, for further clarification

## 2021-09-15 PROCEDURE — 93227 XTRNL ECG REC<48 HR R&I: CPT | Performed by: INTERNAL MEDICINE

## 2021-09-20 ENCOUNTER — OFFICE VISIT (OUTPATIENT)
Dept: PODIATRY | Facility: CLINIC | Age: 61
End: 2021-09-20
Payer: COMMERCIAL

## 2021-09-20 VITALS
SYSTOLIC BLOOD PRESSURE: 138 MMHG | DIASTOLIC BLOOD PRESSURE: 90 MMHG | HEART RATE: 68 BPM | BODY MASS INDEX: 31.42 KG/M2 | HEIGHT: 67 IN | WEIGHT: 200.2 LBS

## 2021-09-20 DIAGNOSIS — M19.90 INFLAMMATORY ARTHROPATHY: Primary | ICD-10-CM

## 2021-09-20 DIAGNOSIS — B35.3 TINEA PEDIS, UNSPECIFIED LATERALITY: ICD-10-CM

## 2021-09-20 DIAGNOSIS — M25.475 SWELLING OF FOOT JOINT, LEFT: ICD-10-CM

## 2021-09-20 DIAGNOSIS — L03.116 CELLULITIS OF LEFT FOOT: ICD-10-CM

## 2021-09-20 PROCEDURE — 99214 OFFICE O/P EST MOD 30 MIN: CPT | Performed by: PODIATRIST

## 2021-09-20 RX ORDER — DOXYCYCLINE 100 MG/1
100 TABLET ORAL 2 TIMES DAILY
Qty: 14 TABLET | Refills: 0 | Status: SHIPPED | OUTPATIENT
Start: 2021-09-20 | End: 2021-09-27

## 2021-09-20 RX ORDER — COLCHICINE 0.6 MG/1
0.6 TABLET ORAL 2 TIMES DAILY
Qty: 60 TABLET | Refills: 0 | Status: SHIPPED | OUTPATIENT
Start: 2021-09-20 | End: 2021-10-06 | Stop reason: SDUPTHER

## 2021-09-20 NOTE — PROGRESS NOTES
PATIENT:  Brenda Day  1960       ASSESSMENT:     1  Inflammatory arthropathy  Uric acid    colchicine (COLCRYS) 0 6 mg tablet   2  Cellulitis of left foot  doxycycline (ADOXA) 100 MG tablet   3  Swelling of foot joint, left               PLAN:  1  Patient was counseled and educated on the condition and the diagnosis  2  Previous blood work was reviewed and discussed  Repeat Uric acid  3  Start him on doxycycline  Colchicine after blood work  Low purine diet for pre-caution  4  Resting and elevation  Stay off of his feet  5  Unusual clinical presentation to be considered as secondary bacterial infection from tinea pedis  Continue to treat topically for tinea pedis  6  RA in 2 weeks  Subjective:     HPI  The patient presents with chief complaint of flare up on left foot inflammation and swelling  He started some swelling / warmth / redness on left foot yesterday  It was painful yesterday  Then,  pain on left foot today  Still notices mild swelling  There is mild red [place] on left shin  He also noticed some Athletes' foot and was using OTC cream   No numbness  No dysfunction  The following portions of the patient's history were reviewed and updated as appropriate: allergies, current medications, past family history, past medical history, past social history, past surgical history and problem list   All pertinent labs and images were reviewed        Past Medical History  Past Medical History:   Diagnosis Date    Arthritis     Fingers, lower back ,right hip    Chronic pain     CPAP (continuous positive airway pressure) dependence     GERD (gastroesophageal reflux disease)     Hemangioma of skin     Hypertension     Irritable bowel syndrome     Lumbar spondylolysis     Multiple pigmented nevi     Obstructive sleep apnea syndrome     PONV (postoperative nausea and vomiting)     Premature atrial beats     Premature ventricular beats     Rectus diastasis     Sleep apnea     Umbilical hernia        Past Surgical History  Past Surgical History:   Procedure Laterality Date    HERNIA REPAIR      KNEE ARTHROSCOPY Right     [place] REPAIR UMBILICAL AZTE,6+N/W,JGNUS N/A 12/12/2017    Procedure: OPEN UMBILICAL HERNIA REPAIR WITH MESH;  Surgeon: Elda Buitrago MD;  Location: [facility];  Service: General    WISDOM TOOTH EXTRACTION Left         Allergies:  Lisinopril, Ibuprofen, and Penicillins    Medications:  Current Outpatient Medications   Medication Sig Dispense Refill    amLODIPine (NORVASC) 10 mg tablet Take 1 tablet (10 mg total) by mouth daily 90 tablet 1    chlorthalidone 25 mg tablet Take 0 5 tablets (12 5 mg total) by mouth daily 30 tablet 6    clotrimazole (LOTRIMIN) 1 % cream Apply topically 2 (two) times a day 30 g 2    meloxicam (MOBIC) 15 mg tablet Take 1 tablet (15 mg total) by mouth daily 90 tablet 1    Multiple Vitamins-Minerals (multivitamin with minerals) tablet Take 1 tablet by mouth daily      pantoprazole (PROTONIX) 40 mg tablet Take 40 mg by mouth daily      colchicine (COLCRYS) 0 6 mg tablet Take 1 tablet (0 6 mg total) by mouth 2 (two) times a day With meal  60 tablet 0    doxycycline (ADOXA) 100 MG tablet Take 1 tablet (100 mg total) by mouth 2 (two) times a day for 7 days 14 tablet 0     No current facility-administered medications for this visit  Social History:  Social History     Socioeconomic History    Marital status: /Civil Union     Spouse name: None    Number of children: None    Years of education: None    Highest education level: None   Occupational History    None   Tobacco Use    Smoking status: Never Smoker    Smokeless tobacco: Never Used   Vaping Use    Vaping Use: Never used   Substance and Sexual Activity    Alcohol use:  Yes     Alcohol/week: 4 0 standard drinks     Types: 4 Cans of beer per week    Drug use: No    Sexual activity: Yes     Partners: Female     Birth control/protection: None   Other Topics Concern    None   Social History Narrative    None     Social Determinants of Health     Financial Resource Strain: Low Risk     Difficulty of Paying Living Expenses: Not hard at all   Food Insecurity: No Food Insecurity    Worried About Running Out of Food in the Last Year: Never true    Mireille of Food in the Last Year: Never true   Transportation Needs: No Transportation Needs    Lack of Transportation (Medical): No    Lack of Transportation (Non-Medical): No   Physical Activity: Sufficiently Active    Days of Exercise per Week: 3 days    Minutes of Exercise per Session: 60 min   Stress: Stress Concern Present    Feeling of Stress : To some extent   Social Connections: Socially Integrated    Frequency of Communication with Friends and Family: More than three times a week    Frequency of Social Gatherings with Friends and Family: Once a week    Attends Church Services: More than 4 times per year    Active Member of [employer] or Organizations: Yes    Attends Club or Organization Meetings: More than 4 times per year    Marital Status:    Intimate Partner Violence: Not At Risk    Fear of Current or Ex-Partner: No    Emotionally Abused: No    Physically Abused: No    Sexually Abused: No          Review of Systems   Constitutional: Negative for appetite change, chills and fever  Respiratory: Negative for cough and shortness of breath  Cardiovascular: Negative for chest pain and leg swelling  Gastrointestinal: Negative for diarrhea, nausea and vomiting  Musculoskeletal: Negative for gait problem  Objective:      /90   Pulse 68   Ht 5' 7" (1 702 m) Comment: verbal  Wt 90 8 kg (200 lb 3 2 oz)   BMI 31 36 kg/m²          Physical Exam  Vitals reviewed  Constitutional:       General: He is not in acute distress  Appearance: Normal appearance  He is not ill-appearing or toxic-appearing     Cardiovascular:      Rate and Rhythm: Normal rate and regular rhythm  Pulses: Normal pulses  Dorsalis pedis pulses are 2+ on the right side and 2+ on the left side  Posterior tibial pulses are 2+ on the right side and 2+ on the left side  Pulmonary:      Effort: Pulmonary effort is normal  No respiratory distress  Musculoskeletal:         General: No swelling, tenderness or signs of injury  Normal range of motion  Right lower leg: No edema  Left lower leg: No edema  Right foot: No foot drop  Left foot: No foot drop  Comments: Mild fullness/ edema / warmth around lesser MPJs left foot  Diffuse circular area with mild redness on left shin  Skin:     General: Skin is warm and moist       Capillary Refill: Capillary refill takes less than 2 seconds  Coloration: Skin is not cyanotic or mottled  Findings: No abscess, ecchymosis, erythema or wound  Nails: There is no clubbing  Comments: Intedigital tinea pedis in left 3rd interspace  Mild skin peeling on right 3rd and 4th interspace  Neurological:      General: No focal deficit present  Mental Status: He is alert and oriented to person, place, and time  Cranial Nerves: No cranial nerve deficit  Sensory: No sensory deficit  Motor: No weakness  Coordination: Coordination normal       Gait: Gait normal    Psychiatric:         Mood and Affect: Mood normal          Behavior: Behavior normal          Thought Content:  Thought content normal          Judgment: Judgment normal

## 2021-09-21 ENCOUNTER — APPOINTMENT (OUTPATIENT)
Dept: LAB | Facility: CLINIC | Age: 61
End: 2021-09-21
Payer: COMMERCIAL

## 2021-09-21 DIAGNOSIS — M19.90 INFLAMMATORY ARTHROPATHY: ICD-10-CM

## 2021-09-21 DIAGNOSIS — I10 ESSENTIAL HYPERTENSION: ICD-10-CM

## 2021-09-21 LAB
ANION GAP SERPL CALCULATED.3IONS-SCNC: 7 MMOL/L (ref 4–13)
BUN SERPL-MCNC: 17 MG/DL (ref 5–25)
CALCIUM SERPL-MCNC: 9.4 MG/DL (ref 8.3–10.1)
CHLORIDE SERPL-SCNC: 104 MMOL/L (ref 100–108)
CO2 SERPL-SCNC: 28 MMOL/L (ref 21–32)
CREAT SERPL-MCNC: 1.2 MG/DL (ref 0.6–1.3)
GFR SERPL CREATININE-BSD FRML MDRD: 66 ML/MIN/1.73SQ M
GLUCOSE P FAST SERPL-MCNC: 91 MG/DL (ref 65–99)
POTASSIUM SERPL-SCNC: 3.9 MMOL/L (ref 3.5–5.3)
SODIUM SERPL-SCNC: 139 MMOL/L (ref 136–145)
URATE SERPL-MCNC: 7.8 MG/DL (ref 4.2–8)

## 2021-09-21 PROCEDURE — 84550 ASSAY OF BLOOD/URIC ACID: CPT

## 2021-09-21 PROCEDURE — 36415 COLL VENOUS BLD VENIPUNCTURE: CPT

## 2021-09-21 PROCEDURE — 80048 BASIC METABOLIC PNL TOTAL CA: CPT

## 2021-09-24 ENCOUNTER — OFFICE VISIT (OUTPATIENT)
Dept: OBGYN CLINIC | Facility: CLINIC | Age: 61
End: 2021-09-24
Payer: COMMERCIAL

## 2021-09-24 VITALS
HEIGHT: 67 IN | BODY MASS INDEX: 31.39 KG/M2 | SYSTOLIC BLOOD PRESSURE: 128 MMHG | WEIGHT: 200 LBS | DIASTOLIC BLOOD PRESSURE: 80 MMHG

## 2021-09-24 DIAGNOSIS — M19.042 PRIMARY OSTEOARTHRITIS OF BOTH HANDS: Primary | ICD-10-CM

## 2021-09-24 DIAGNOSIS — M19.041 PRIMARY OSTEOARTHRITIS OF BOTH HANDS: Primary | ICD-10-CM

## 2021-09-24 PROCEDURE — 1036F TOBACCO NON-USER: CPT | Performed by: ORTHOPAEDIC SURGERY

## 2021-09-24 PROCEDURE — 3008F BODY MASS INDEX DOCD: CPT | Performed by: ORTHOPAEDIC SURGERY

## 2021-09-24 PROCEDURE — 99213 OFFICE O/P EST LOW 20 MIN: CPT | Performed by: ORTHOPAEDIC SURGERY

## 2021-09-24 NOTE — PROGRESS NOTES
Ortho Sports Medicine Knee Follow Up Visit     Assesment:     63 y o  male right knee lateral joint osteoarthritis    Plan:    Conservative treatment:    Ice to knee for 20 minutes at least 1-2 times daily  PT for ROM/strengthening to knee, hip and core  OTC NSAIDS prn for pain  Let pain guide gradual return activities  Imaging:    No imaging was available for review today  Injection:    No Injection planned at this time  Surgery:     No surgery is recommended at this point, continue with conservative treatment plan as noted  Follow up:    Return if symptoms worsen or fail to improve  Chief Complaint   Patient presents with    Right Knee - Locking       History of Present Illness: The patient is returns for follow up of right knee osteoarthritis  He states that about 2 weeks ago, he had a twisting, pivoting injury getting up from a seated position that caused locking of the knee  He wore his  brace and meloxicam, which resolved his symptoms for 2 days  Since the prior visit, He reports significant improvement  Pain is located lateral      Pain is improved by rest, ice, NSAIDS and bracing  Pain is aggravated by weight bearing and pivoting on a planted foot  Symptoms include popping  The patient has tried rest, ice, NSAIDS, bracing and injection            Knee Surgical History:  Right knee arthroscopy x2    Past Medical, Social and Family History:  Past Medical History:   Diagnosis Date    Arthritis     Fingers, lower back ,right hip    Chronic pain     CPAP (continuous positive airway pressure) dependence     GERD (gastroesophageal reflux disease)     Hemangioma of skin     Hypertension     Irritable bowel syndrome     Lumbar spondylolysis     Multiple pigmented nevi     Obstructive sleep apnea syndrome     PONV (postoperative nausea and vomiting)     Premature atrial beats     Premature ventricular beats     Rectus diastasis     Sleep apnea     Umbilical hernia      Past Surgical History:   Procedure Laterality Date    HERNIA REPAIR      KNEE ARTHROSCOPY Right     [place] REPAIR UMBILICAL FKJN,0+V/T,PEWKX N/A 12/12/2017    Procedure: OPEN UMBILICAL HERNIA REPAIR WITH MESH;  Surgeon: Robin Delgadillo MD;  Location: [facility];  Service: General    WISDOM TOOTH EXTRACTION Left      Allergies   Allergen Reactions    Lisinopril Swelling    Ibuprofen Rash    Penicillins Rash     Other reaction(s): Rash     Current Outpatient Medications on File Prior to Visit   Medication Sig Dispense Refill    amLODIPine (NORVASC) 10 mg tablet Take 1 tablet (10 mg total) by mouth daily 90 tablet 1    chlorthalidone 25 mg tablet Take 0 5 tablets (12 5 mg total) by mouth daily 30 tablet 6    clotrimazole (LOTRIMIN) 1 % cream Apply topically 2 (two) times a day 30 g 2    colchicine (COLCRYS) 0 6 mg tablet Take 1 tablet (0 6 mg total) by mouth 2 (two) times a day With meal  60 tablet 0    doxycycline (ADOXA) 100 MG tablet Take 1 tablet (100 mg total) by mouth 2 (two) times a day for 7 days 14 tablet 0    meloxicam (MOBIC) 15 mg tablet Take 1 tablet (15 mg total) by mouth daily 90 tablet 1    Multiple Vitamins-Minerals (multivitamin with minerals) tablet Take 1 tablet by mouth daily      pantoprazole (PROTONIX) 40 mg tablet Take 40 mg by mouth daily       No current facility-administered medications on file prior to visit  Social History     Socioeconomic History    Marital status: /Civil Union     Spouse name: Not on file    Number of children: Not on file    Years of education: Not on file    Highest education level: Not on file   Occupational History    Not on file   Tobacco Use    Smoking status: Never Smoker    Smokeless tobacco: Never Used   Vaping Use    Vaping Use: Never used   Substance and Sexual Activity    Alcohol use:  Yes     Alcohol/week: 4 0 standard drinks     Types: 4 Cans of beer per week    Drug use: No    Sexual activity: Yes Partners: Female     Birth control/protection: None   Other Topics Concern    Not on file   Social History Narrative    Not on file     Social Determinants of Health     Financial Resource Strain: Low Risk     Difficulty of Paying Living Expenses: Not hard at all   Food Insecurity: No Food Insecurity    Worried About Running Out of Food in the Last Year: Never true    Mireille of Food in the Last Year: Never true   Transportation Needs: No Transportation Needs    Lack of Transportation (Medical): No    Lack of Transportation (Non-Medical): No   Physical Activity: Sufficiently Active    Days of Exercise per Week: 3 days    Minutes of Exercise per Session: 60 min   Stress: Stress Concern Present    Feeling of Stress : To some extent   Social Connections: Socially Integrated    Frequency of Communication with Friends and Family: More than three times a week    Frequency of Social Gatherings with Friends and Family: Once a week    Attends Church Services: More than 4 times per year    Active Member of [employer] or Organizations: Yes    Attends Club or Organization Meetings: More than 4 times per year    Marital Status:    Intimate Partner Violence: Not At Risk    Fear of Current or Ex-Partner: No    Emotionally Abused: No    Physically Abused: No    Sexually Abused: No         I have reviewed the past medical, surgical, social and family history, medications and allergies as documented in the EMR  Review of systems: ROS is negative other than that noted in the HPI  Constitutional: Negative for fatigue and fever  Physical Exam:    Blood pressure 128/80, height 5' 7" (1 702 m), weight 90 7 kg (200 lb)      General/Constitutional: NAD, well developed, well nourished  HENT: Normocephalic, atraumatic  CV: Intact distal pulses, regular rate  Resp: No respiratory distress or labored breathing  Lymphatic: No lymphadenopathy palpated  Neuro: Alert and Oriented x 3, no focal deficits  Psych: Normal mood, normal affect, normal judgement, normal behavior  Skin: Warm, dry, no rashes, no erythema      Knee Exam (focused): RIGHT LEFT   ROM:   0-130 0-130   Palpation: Effusion negative negative     MJL tenderness Negative Negative     LJL tenderness Positive Negative   Meniscus:  Rene Negative Negative    Apley's Compression Negative Negative   Instability: Varus stable stable     Valgus stable stable   Special Tests: Lachman Not Applicable Not Applicable     Posterior drawer Negative Negative     Anterior drawer Negative Negative     Pivot shift not tested not tested     Dial not tested not tested   Patella: Palpation no tenderness no tenderness     Mobility 1/4 1/4     Apprehension Negative Negative   Other: Single leg 1/4 squat not tested not tested           LE NV Exam: +2 DP/PT pulses bilaterally  Sensation intact to light touch L2-S1 bilaterally    No calf tenderness to palpation bilaterally      Knee Imaging    No imaging was performed today      Scribe Attestation    I,:  Cliff Frazier am acting as a scribe while in the presence of the attending physician :       I,:  Darion Osorio DO personally performed the services described in this documentation    as scribed in my presence :

## 2021-09-30 ENCOUNTER — HOSPITAL ENCOUNTER (OUTPATIENT)
Dept: RADIOLOGY | Facility: HOSPITAL | Age: 61
Discharge: HOME | End: 2021-09-30
Attending: INTERNAL MEDICINE
Payer: COMMERCIAL

## 2021-09-30 DIAGNOSIS — Q23.1 AORTOPATHY ASSOCIATED WITH BICUSPID AORTIC VALVE: ICD-10-CM

## 2021-09-30 DIAGNOSIS — Q23.1 BICUSPID AORTIC VALVE: ICD-10-CM

## 2021-09-30 PROCEDURE — 75557 CARDIAC MRI FOR MORPH: CPT

## 2021-09-30 PROCEDURE — G1004 CDSM NDSC: HCPCS

## 2021-10-06 ENCOUNTER — OFFICE VISIT (OUTPATIENT)
Dept: PODIATRY | Facility: CLINIC | Age: 61
End: 2021-10-06
Payer: COMMERCIAL

## 2021-10-06 VITALS
HEIGHT: 67 IN | SYSTOLIC BLOOD PRESSURE: 125 MMHG | HEART RATE: 62 BPM | DIASTOLIC BLOOD PRESSURE: 86 MMHG | BODY MASS INDEX: 31.01 KG/M2

## 2021-10-06 DIAGNOSIS — M10.072 IDIOPATHIC GOUT OF LEFT FOOT, UNSPECIFIED CHRONICITY: Primary | ICD-10-CM

## 2021-10-06 PROCEDURE — 1036F TOBACCO NON-USER: CPT | Performed by: PODIATRIST

## 2021-10-06 PROCEDURE — 99213 OFFICE O/P EST LOW 20 MIN: CPT | Performed by: PODIATRIST

## 2021-10-06 RX ORDER — COLCHICINE 0.6 MG/1
0.6 TABLET ORAL 2 TIMES DAILY
Qty: 180 TABLET | Refills: 0 | Status: SHIPPED | OUTPATIENT
Start: 2021-10-06 | End: 2021-11-24 | Stop reason: ALTCHOICE

## 2021-11-24 ENCOUNTER — TELEPHONE (OUTPATIENT)
Dept: FAMILY MEDICINE CLINIC | Facility: CLINIC | Age: 61
End: 2021-11-24

## 2021-11-24 ENCOUNTER — OFFICE VISIT (OUTPATIENT)
Dept: FAMILY MEDICINE CLINIC | Facility: CLINIC | Age: 61
End: 2021-11-24
Payer: COMMERCIAL

## 2021-11-24 ENCOUNTER — HOSPITAL ENCOUNTER (OUTPATIENT)
Dept: NON INVASIVE DIAGNOSTICS | Facility: HOSPITAL | Age: 61
Discharge: HOME | End: 2021-11-24
Payer: COMMERCIAL

## 2021-11-24 VITALS
OXYGEN SATURATION: 96 % | WEIGHT: 207.2 LBS | SYSTOLIC BLOOD PRESSURE: 150 MMHG | HEART RATE: 88 BPM | DIASTOLIC BLOOD PRESSURE: 86 MMHG | TEMPERATURE: 97.4 F | HEIGHT: 67 IN | RESPIRATION RATE: 18 BRPM | BODY MASS INDEX: 32.52 KG/M2

## 2021-11-24 DIAGNOSIS — M79.662 PAIN OF LEFT CALF: Primary | ICD-10-CM

## 2021-11-24 DIAGNOSIS — Z86.72 HISTORY OF THROMBOPHLEBITIS: ICD-10-CM

## 2021-11-24 DIAGNOSIS — M79.605 LEFT LEG PAIN: ICD-10-CM

## 2021-11-24 DIAGNOSIS — I10 ESSENTIAL HYPERTENSION: ICD-10-CM

## 2021-11-24 DIAGNOSIS — M79.662 PAIN OF LEFT CALF: ICD-10-CM

## 2021-11-24 PROCEDURE — 93971 EXTREMITY STUDY: CPT

## 2021-11-24 PROCEDURE — 99213 OFFICE O/P EST LOW 20 MIN: CPT | Performed by: FAMILY MEDICINE

## 2021-11-24 PROCEDURE — 93971 EXTREMITY STUDY: CPT | Performed by: INTERNAL MEDICINE

## 2021-11-24 PROCEDURE — 1036F TOBACCO NON-USER: CPT | Performed by: FAMILY MEDICINE

## 2021-11-24 RX ORDER — AMLODIPINE BESYLATE 5 MG/1
5 TABLET ORAL DAILY
Qty: 30 TABLET | Refills: 5
Start: 2021-11-24 | End: 2022-03-28 | Stop reason: SDUPTHER

## 2021-11-30 ENCOUNTER — APPOINTMENT (OUTPATIENT)
Dept: LAB | Facility: CLINIC | Age: 61
End: 2021-11-30
Payer: COMMERCIAL

## 2021-11-30 ENCOUNTER — OFFICE VISIT (OUTPATIENT)
Dept: FAMILY MEDICINE CLINIC | Facility: CLINIC | Age: 61
End: 2021-11-30
Payer: COMMERCIAL

## 2021-11-30 ENCOUNTER — APPOINTMENT (OUTPATIENT)
Dept: RADIOLOGY | Facility: CLINIC | Age: 61
End: 2021-11-30
Payer: COMMERCIAL

## 2021-11-30 VITALS
SYSTOLIC BLOOD PRESSURE: 130 MMHG | TEMPERATURE: 97.3 F | OXYGEN SATURATION: 97 % | HEART RATE: 68 BPM | WEIGHT: 207.2 LBS | DIASTOLIC BLOOD PRESSURE: 84 MMHG | RESPIRATION RATE: 18 BRPM | HEIGHT: 67 IN | BODY MASS INDEX: 32.52 KG/M2

## 2021-11-30 DIAGNOSIS — I80.02 THROMBOPHLEBITIS OF SUPERFICIAL VEINS OF LEFT LOWER EXTREMITY: Primary | ICD-10-CM

## 2021-11-30 DIAGNOSIS — I80.9 RECURRENT PHLEBITIS OF SUPERFICIAL VEIN: ICD-10-CM

## 2021-11-30 DIAGNOSIS — I80.02 THROMBOPHLEBITIS OF SUPERFICIAL VEINS OF LEFT LOWER EXTREMITY: ICD-10-CM

## 2021-11-30 DIAGNOSIS — G89.29 CHRONIC PAIN OF LEFT KNEE: ICD-10-CM

## 2021-11-30 DIAGNOSIS — M25.562 CHRONIC PAIN OF LEFT KNEE: ICD-10-CM

## 2021-11-30 LAB
BASOPHILS # BLD AUTO: 0.06 THOUSANDS/ΜL (ref 0–0.1)
BASOPHILS NFR BLD AUTO: 1 % (ref 0–1)
D DIMER PPP FEU-MCNC: 0.29 UG/ML FEU
EOSINOPHIL # BLD AUTO: 0.13 THOUSAND/ΜL (ref 0–0.61)
EOSINOPHIL NFR BLD AUTO: 2 % (ref 0–6)
ERYTHROCYTE [DISTWIDTH] IN BLOOD BY AUTOMATED COUNT: 13.2 % (ref 11.6–15.1)
HCT VFR BLD AUTO: 46.4 % (ref 36.5–49.3)
HCYS SERPL-SCNC: 10.1 UMOL/L (ref 5.3–14.2)
HGB BLD-MCNC: 15.3 G/DL (ref 12–17)
IMM GRANULOCYTES # BLD AUTO: 0.02 THOUSAND/UL (ref 0–0.2)
IMM GRANULOCYTES NFR BLD AUTO: 0 % (ref 0–2)
LYMPHOCYTES # BLD AUTO: 1.54 THOUSANDS/ΜL (ref 0.6–4.47)
LYMPHOCYTES NFR BLD AUTO: 25 % (ref 14–44)
MCH RBC QN AUTO: 29.4 PG (ref 26.8–34.3)
MCHC RBC AUTO-ENTMCNC: 33 G/DL (ref 31.4–37.4)
MCV RBC AUTO: 89 FL (ref 82–98)
MONOCYTES # BLD AUTO: 0.68 THOUSAND/ΜL (ref 0.17–1.22)
MONOCYTES NFR BLD AUTO: 11 % (ref 4–12)
NEUTROPHILS # BLD AUTO: 3.84 THOUSANDS/ΜL (ref 1.85–7.62)
NEUTS SEG NFR BLD AUTO: 61 % (ref 43–75)
NRBC BLD AUTO-RTO: 0 /100 WBCS
PLATELET # BLD AUTO: 220 THOUSANDS/UL (ref 149–390)
PMV BLD AUTO: 11 FL (ref 8.9–12.7)
RBC # BLD AUTO: 5.2 MILLION/UL (ref 3.88–5.62)
WBC # BLD AUTO: 6.27 THOUSAND/UL (ref 4.31–10.16)

## 2021-11-30 PROCEDURE — 99214 OFFICE O/P EST MOD 30 MIN: CPT | Performed by: FAMILY MEDICINE

## 2021-11-30 PROCEDURE — 81241 F5 GENE: CPT

## 2021-11-30 PROCEDURE — 85025 COMPLETE CBC W/AUTO DIFF WBC: CPT

## 2021-11-30 PROCEDURE — 85670 THROMBIN TIME PLASMA: CPT

## 2021-11-30 PROCEDURE — 83090 ASSAY OF HOMOCYSTEINE: CPT

## 2021-11-30 PROCEDURE — 85306 CLOT INHIBIT PROT S FREE: CPT

## 2021-11-30 PROCEDURE — 85613 RUSSELL VIPER VENOM DILUTED: CPT

## 2021-11-30 PROCEDURE — 86146 BETA-2 GLYCOPROTEIN ANTIBODY: CPT

## 2021-11-30 PROCEDURE — 81240 F2 GENE: CPT

## 2021-11-30 PROCEDURE — 85732 THROMBOPLASTIN TIME PARTIAL: CPT

## 2021-11-30 PROCEDURE — 3008F BODY MASS INDEX DOCD: CPT | Performed by: FAMILY MEDICINE

## 2021-11-30 PROCEDURE — 36415 COLL VENOUS BLD VENIPUNCTURE: CPT

## 2021-11-30 PROCEDURE — 85303 CLOT INHIBIT PROT C ACTIVITY: CPT

## 2021-11-30 PROCEDURE — 85305 CLOT INHIBIT PROT S TOTAL: CPT

## 2021-11-30 PROCEDURE — 73562 X-RAY EXAM OF KNEE 3: CPT

## 2021-11-30 PROCEDURE — 85379 FIBRIN DEGRADATION QUANT: CPT

## 2021-11-30 PROCEDURE — 86147 CARDIOLIPIN ANTIBODY EA IG: CPT

## 2021-11-30 PROCEDURE — 85300 ANTITHROMBIN III ACTIVITY: CPT

## 2021-11-30 PROCEDURE — 85705 THROMBOPLASTIN INHIBITION: CPT

## 2021-11-30 RX ORDER — PREDNISONE 20 MG/1
TABLET ORAL
Qty: 15 TABLET | Refills: 0 | Status: SHIPPED | OUTPATIENT
Start: 2021-11-30

## 2021-12-02 LAB
DEPRECATED AT III PPP: 89 % OF NORMAL (ref 92–136)
PROT C AG ACT/NOR PPP IA: 115 % OF NORMAL (ref 60–150)
PROT S ACT/NOR PPP: 96 % (ref 71–117)

## 2021-12-03 PROBLEM — G89.29 CHRONIC PAIN OF LEFT KNEE: Status: ACTIVE | Noted: 2021-12-03

## 2021-12-03 PROBLEM — M25.562 CHRONIC PAIN OF LEFT KNEE: Status: ACTIVE | Noted: 2021-12-03

## 2021-12-03 LAB
APTT SCREEN TO CONFIRM RATIO: 1.2 RATIO (ref 0–1.34)
B2 GLYCOPROT1 IGA SERPL IA-ACNC: 2.7
B2 GLYCOPROT1 IGG SERPL IA-ACNC: 1.3
B2 GLYCOPROT1 IGM SERPL IA-ACNC: 4.5
CARDIOLIPIN IGA SER IA-ACNC: 5.3
CARDIOLIPIN IGG SER IA-ACNC: 2.8
CARDIOLIPIN IGM SER IA-ACNC: 1.3
CONFIRM APTT/NORMAL: 38.5 SEC (ref 0–47.6)
LA PPP-IMP: NORMAL
SCREEN APTT: 36.3 SEC (ref 0–51.9)
SCREEN DRVVT: 43.2 SEC (ref 0–47)
THROMBIN TIME: 19.8 SEC (ref 0–23)

## 2021-12-04 LAB
PROT S ACT/NOR PPP: 102 % (ref 61–136)
PROT S PPP-ACNC: 104 % (ref 60–150)

## 2021-12-06 LAB
F2 GENE MUT ANL BLD/T: NORMAL
F5 GENE MUT ANL BLD/T: NORMAL

## 2021-12-30 DIAGNOSIS — D68.59 ANTITHROMBIN 3 DEFICIENCY (HCC): Primary | ICD-10-CM

## 2022-01-05 ENCOUNTER — TELEPHONE (OUTPATIENT)
Dept: HEMATOLOGY ONCOLOGY | Facility: CLINIC | Age: 62
End: 2022-01-05

## 2022-01-10 ENCOUNTER — TELEPHONE (OUTPATIENT)
Dept: HEMATOLOGY ONCOLOGY | Facility: CLINIC | Age: 62
End: 2022-01-10

## 2022-01-10 NOTE — TELEPHONE ENCOUNTER
New Patient Intake Form   Patient Details:    Haley Maguire  1960  [id number]    Appointment Information   Who is calling to schedule? Patient   If not self, what is the caller's name? Please put name of RBC nurse as well  n/a   Referring provider Dr Diana Patterson   What is the diagnosis? Thrombophlebitis of superficial veins            Is there a confirmed tissue diagnosis? no   Is patient aware of diagnosis? yes   Have you had any imaging or labs done? If yes, where? (If imaging done outside of [facility], please remind patient to bring a disk ) Yes [facility]    Have you been seen by another Oncologist/Hematologist?  If so, who and where? no   Are the records in [facility] or Care Everywhere? yes   Are records needed from an outside facility? n/a   If yes, Name of facility, city and state where facility is located  n/a   Preferred [place]   Is the patient willing to be seen by another provider?   (This is for breast patients only) n/a   Miscellaneous Information: Adolfo Rubalcava 2/2/2022 @9:00am

## 2022-01-28 ENCOUNTER — TELEPHONE (OUTPATIENT)
Dept: HEMATOLOGY ONCOLOGY | Facility: CLINIC | Age: 62
End: 2022-01-28

## 2022-01-28 NOTE — TELEPHONE ENCOUNTER
Reschedule Appointment     Who is calling in Patient    Doctor Appointment Scheduled with Alix Carty date and time 02/02  At 9:00am   New date and time 02/18 at 11:00am   Location [place]   Patient verbalized understanding    yes

## 2022-02-11 ENCOUNTER — TELEPHONE (OUTPATIENT)
Dept: HEMATOLOGY ONCOLOGY | Facility: HOSPITAL | Age: 62
End: 2022-02-11

## 2022-02-11 NOTE — TELEPHONE ENCOUNTER
Called patient and LVM if patient could see Dr Mindy Brown in [facility]    LVM for hopeline # for patient to call back

## 2022-02-15 ENCOUNTER — TELEPHONE (OUTPATIENT)
Dept: HEMATOLOGY ONCOLOGY | Facility: CLINIC | Age: 62
End: 2022-02-15

## 2022-02-15 NOTE — PROGRESS NOTES
Hematology/Oncology Outpatient Consult Note  Jerzy Ni 63 y o  male MRN: @ Encounter: [id number]        Date:  2/16/2022        CC:  Abnormal thrombosis panel  Mildly decreased anti thrombin 3 activity in a patient with a history of superficial thrombophlebitis  No history of DVT or PE      HPI:  Haley Maguire is a 63year old male with a history of hypertension, bicuspid aortic valve, osteoarthritis, ZIA and superficial thrombophlebitis  He is being referred to Hematology for evaluation of abnormal thrombosis panel which detected a mildly decreased anti thrombin 3 antibody  Patient has never had a VTE, however he has had chronic superficial thrombophlebitis dating back to May 2021    Patient tested positive for COVID on 04/26/2021  Approximately 1 week later, he developed symptoms of atraumatic swelling and tenderness in his posterior left calf  He was evaluated in the ED out of concern for DVT on 5/5/21  VAS lower limb duplex study was completed and there was no evidence of any acute or chronic DVT in the right or left lower limb  However he was noted to have acute occlusive superficial thrombophlebitis in the small saphenous vein from the mid calf to the knee without propagation into the popliteal vein   He underwent repeat VAS duplex studies on 5/11 and 5/19/2021 and both of the studies showed that there was no change to the superficial clot  His PCP advised management with heat and anti-inflammatories as needed  Patient was seen in ED in July 2021 for acute cellulitis of left foot  A repeat VAS duplex study was ordered and he was again noted to have superficial thrombophlebitis of the superficial saphenous vein that was unchanged from prior studies  He was diagnosed with inflammatory arthropathy and is following with podiatry  In November, patient was evaluated by PCP for knee pain and chronic aches in his left leg    Due to persistent symptoms PCP ordered imaging and thrombosis panel   X-ray of knee did not demonstrate any acute osseous abnormality  No acute fracture or dislocation  He was noted to have a small joint effusion  Repeat VAS duplex study was also done  This was negative for any evidence of acute or chronic DVT  However superficial thrombosis within the small saphenous vein from the knee to the proximal calf without propagation into the popliteal vein was again noted  His thrombosis panel resulted with a slight deficiency in anti thrombin 3 level  So he was referred to Hematology for further evaluation given his history of superficial thrombophlebitis    His thrombosis panel which was completed on 11/30/2021 is reviewed  The only abnormality was a mild deficiency in anti thrombin 3 activity  Remainder of his thrombosis panel was normal without evidence of genetic predisposition to clotting  Serum homocystine level was normal   D-dimer was normal   CBC and differential are normal     Ref Range & Units 11/30/21 1058   AntiThrombIN III Activity 92 - 136 % of Normal 89 Low         He has no varicose veins or h/o venous insufficiency  Patient does have chronic arthralgias/arthritis  He denies any calf pain, erythema or swelling at present  No c/o CP or SOB  Denies any family history of clotting disorder  However, his grandfather did have blood clot after undergoing hip replacement  Patient is active  He used to play basketball regularly however can not this so much anymore do this arthritis  He does continue to golf  Test Results:    Imaging: No results found      Labs:   Lab Results   Component Value Date    WBC 6 27 11/30/2021    HGB 15 3 11/30/2021    HCT 46 4 11/30/2021    MCV 89 11/30/2021     11/30/2021     Lab Results   Component Value Date     09/21/2015    K 3 9 09/21/2021     09/21/2021    CO2 28 09/21/2021    ANIONGAP 6 09/21/2015    BUN 17 09/21/2021    CREATININE 1 20 09/21/2021    GLUCOSE 74 09/21/2015    GLUF 91 09/21/2021 CALCIUM 9 4 09/21/2021    AST 24 06/23/2021    ALT 41 06/23/2021    ALKPHOS 71 06/23/2021    PROT 6 9 09/21/2015    BILITOT 0 90 09/21/2015    EGFR 66 09/21/2021         ROS:  Review of Systems   Musculoskeletal: Positive for arthralgias  All other systems reviewed and are negative            Active Problems:   Patient Active Problem List   Diagnosis    Essential hypertension    ZIA (obstructive sleep apnea)    Insufficient sleep syndrome    Hypersomnia    Palpitations    Allergic rhinitis    Chronic bilateral low back pain with bilateral sciatica    Chronic myofascial pain    Esophageal reflux    Hemangioma of skin    Displacement of lumbar intervertebral disc without myelopathy    Lumbar spondylolysis    Multiple pigmented nevi    Primary localized osteoarthritis of right hip    Primary localized osteoarthritis of right knee    Rectus diastasis    Chronic pansinusitis    Arthritis of right hip    Lumbar radiculopathy    Squamous cell skin cancer- scalp    Primary osteoarthritis of both hands    Thrombophlebitis of superficial veins of left lower extremity    Abrasion of anterior left lower leg    Hematoma of left lower leg    Bicuspid aortic valve    Aortopathy associated with bicuspid aortic valve    Adult congenital heart disease    Pain of left calf    Chronic pain of left knee       Past Medical History:   Past Medical History:   Diagnosis Date    Arthritis     Fingers, lower back ,right hip    Chronic pain     CPAP (continuous positive airway pressure) dependence     GERD (gastroesophageal reflux disease)     Hemangioma of skin     Hypertension     Irritable bowel syndrome     Lumbar spondylolysis     Multiple pigmented nevi     Obstructive sleep apnea syndrome     PONV (postoperative nausea and vomiting)     Premature atrial beats     Premature ventricular beats     Rectus diastasis     Sleep apnea     Umbilical hernia        Surgical History:   Past Surgical History:   Procedure Laterality Date    HERNIA REPAIR      KNEE ARTHROSCOPY Right     NE REPAIR UMBILICAL RJOX,6+Y/K,ENCZK N/A 12/12/2017    Procedure: OPEN UMBILICAL HERNIA REPAIR WITH MESH;  Surgeon: Gardiner Sicard, MD;  Location:  MAIN OR;  Service: General    WISDOM TOOTH EXTRACTION Left        Family History:    Family History   Problem Relation Age of Onset    Rheum arthritis Mother     Osteoarthritis Mother     Thrombocytopenia Mother     Heart disease Father     Hypertension Father     Coronary artery disease Father     No Known Problems Sister     No Known Problems Brother        Cancer-related family history is not on file  Social History:   Social History     Socioeconomic History    Marital status: /Civil Union     Spouse name: Not on file    Number of children: Not on file    Years of education: Not on file    Highest education level: Not on file   Occupational History    Not on file   Tobacco Use    Smoking status: Never Smoker    Smokeless tobacco: Never Used   Vaping Use    Vaping Use: Never used   Substance and Sexual Activity    Alcohol use: Yes     Alcohol/week: 4 0 standard drinks     Types: 4 Cans of beer per week    Drug use: No    Sexual activity: Yes     Partners: Female     Birth control/protection: None   Other Topics Concern    Not on file   Social History Narrative    Not on file     Social Determinants of Health     Financial Resource Strain: Low Risk     Difficulty of Paying Living Expenses: Not hard at all   Food Insecurity: No Food Insecurity    Worried About [address] in the Last Year: Never true    [address]  in the Last Year: Never true   Transportation Needs: No Transportation Needs    Lack of Transportation (Medical): No    Lack of Transportation (Non-Medical):  No   Physical Activity: Sufficiently Active    Days of Exercise per Week: 3 days    Minutes of Exercise per Session: 60 min   Stress: Stress Concern Present    Feeling of Stress : To some extent   Social Connections: Socially Integrated    Frequency of Communication with Friends and Family: More than three times a week    Frequency of Social Gatherings with Friends and Family: Once a week    Attends Confucianism Services: More than 4 times per year    Active Member of [employer] or Organizations: Yes    Attends Club or Organization Meetings: More than 4 times per year    Marital Status:    Intimate Partner Violence: Not At Risk    Fear of Current or Ex-Partner: No    Emotionally Abused: No    Physically Abused: No    Sexually Abused: No   Housing Stability: Not on file       Current Medications:   Current Outpatient Medications   Medication Sig Dispense Refill    amLODIPine (NORVASC) 5 mg tablet Take 1 tablet (5 mg total) by mouth daily 30 tablet 5    chlorthalidone 25 mg tablet Take 0 5 tablets (12 5 mg total) by mouth daily 30 tablet 6    clotrimazole (LOTRIMIN) 1 % cream Apply topically 2 (two) times a day (Patient taking differently: Apply topically as needed  ) 30 g 2    pantoprazole (PROTONIX) 40 mg tablet Take 40 mg by mouth daily      predniSONE 20 mg tablet Take 3 tablets for 2 days then 2 tablets for 3 days then 1 tablet for 3 days then stop 15 tablet 0     No current facility-administered medications for this visit  Allergies: Allergies   Allergen Reactions    Lisinopril Swelling    Ibuprofen Rash    Penicillins Rash     Other reaction(s): Rash         Physical Exam:    There is no height or weight on file to calculate BSA      Wt Readings from Last 3 Encounters:   11/30/21 94 kg (207 lb 3 2 oz)   11/24/21 94 kg (207 lb 3 2 oz)   09/24/21 90 7 kg (200 lb)        Temp Readings from Last 3 Encounters:   11/30/21 (!) 97 3 °F (36 3 °C) (Tympanic)   11/24/21 (!) 97 4 °F (36 3 °C) (Tympanic)   07/20/21 97 5 °F (36 4 °C) (Tympanic)        BP Readings from Last 3 Encounters:   11/30/21 130/84   11/24/21 150/86   10/06/21 125/86         Pulse Readings from Last 3 Encounters:   11/30/21 68   11/24/21 88   10/06/21 62          Physical Exam  Constitutional:       General: He is not in acute distress  Appearance: He is well-developed  He is not diaphoretic  HENT:      Head: Normocephalic and atraumatic  Mouth/Throat:      Pharynx: No oropharyngeal exudate  Eyes:      General: No scleral icterus  Pupils: Pupils are equal, round, and reactive to light  Cardiovascular:      Rate and Rhythm: Normal rate and regular rhythm  Heart sounds: No murmur heard  Pulmonary:      Effort: Pulmonary effort is normal  No respiratory distress  Breath sounds: Normal breath sounds  Abdominal:      General: Bowel sounds are normal  There is no distension  Palpations: Abdomen is soft  There is no mass  Tenderness: There is no abdominal tenderness  Musculoskeletal:         General: Normal range of motion  Cervical back: Normal range of motion and neck supple  Lymphadenopathy:      Cervical: No cervical adenopathy  Skin:     General: Skin is warm and dry  Neurological:      General: No focal deficit present  Mental Status: He is alert and oriented to person, place, and time  Psychiatric:         Mood and Affect: Mood normal          Behavior: Behavior normal          Thought Content: Thought content normal          Judgment: Judgment normal               Assessment/ Plan:    1  Thrombophlebitis of superficial veins of left lower extremity    The patient is a very pleasant 63year old male who was first noted to have evidence of superficial thrombophlebitis in his left lower leg in 5/2021 approximately one week after being diagnosed with Covid  There was no evidence of acute or chronic DVT  His thrombophlebitis has never resolved and was still evident on most recent VAS duplex study completed on 11/24/2021    Although it has never resolved, it has remained very superficial and there is no evidence it has gotten larger  Patient reports he no longer experiencing any significant pain in his leg  He does have pain in his left knee from time to time secondary to arthritis  Due to persistent findings, his PCP ordered a thrombosis panel which was mostly negative  It did show a very mild decrease in Antithrombin III activity at 89  Normal being %  His D Dimer was normal    Serum Homocystine was normal     I reviewed these findings with patient in detail today  I explained that Antithrombin III deficiency is rare and this can be inherited or acquired  Diagnosis is usually made when antithrombin III activity is consistently less than 80% of normal range and can be associated with increased risk of venous thromboembolism  He has never had a DVT or PE  I have recommended that we repeat his Antithrombin III activity and I will also check Antithrombin III antigen level  He will have these lab tests done and return to review results  He may take a baby aspirin daily  He was recommended to wear compression sock for additional support and continue with his daily activities  Patient verbalized understanding and was in agreement with plan of care  He will return for a follow up in few weeks to review results of repeat testing  He is instructed to call at any time with questions jomar concerns      Barriers: None  Patient  able to self care  Portions of the record may have been created with voice recognition software  Occasional wrong word or "sound a like" substitutions may have occurred due to the inherent limitations of voice recognition software  Read the chart carefully and recognize, using context, where substitutions have occurred

## 2022-02-15 NOTE — TELEPHONE ENCOUNTER
Patient called to reschedule cancelled consult appt   Patient is scheduled for 2-16-22 @ 9:00am with Adolfo Rubalcava in [facility]

## 2022-02-16 ENCOUNTER — CONSULT (OUTPATIENT)
Dept: HEMATOLOGY ONCOLOGY | Facility: HOSPITAL | Age: 62
End: 2022-02-16
Payer: COMMERCIAL

## 2022-02-16 VITALS
HEART RATE: 62 BPM | DIASTOLIC BLOOD PRESSURE: 90 MMHG | RESPIRATION RATE: 14 BRPM | SYSTOLIC BLOOD PRESSURE: 142 MMHG | BODY MASS INDEX: 32.49 KG/M2 | OXYGEN SATURATION: 98 % | TEMPERATURE: 97.6 F | HEIGHT: 67 IN | WEIGHT: 207 LBS

## 2022-02-16 DIAGNOSIS — I80.02 THROMBOPHLEBITIS OF SUPERFICIAL VEINS OF LEFT LOWER EXTREMITY: Primary | ICD-10-CM

## 2022-02-16 PROCEDURE — 1036F TOBACCO NON-USER: CPT | Performed by: NURSE PRACTITIONER

## 2022-02-16 PROCEDURE — 99205 OFFICE O/P NEW HI 60 MIN: CPT | Performed by: NURSE PRACTITIONER

## 2022-02-16 PROCEDURE — 3008F BODY MASS INDEX DOCD: CPT | Performed by: NURSE PRACTITIONER

## 2022-02-16 RX ORDER — ATORVASTATIN CALCIUM 40 MG/1
40 TABLET, FILM COATED ORAL DAILY
COMMUNITY
Start: 2021-12-14

## 2022-02-21 PROBLEM — D68.9 BLOOD CLOTTING DISORDER (HCC): Status: ACTIVE | Noted: 2022-02-21

## 2022-02-21 PROBLEM — I82.1 RECURRENT IDIOPATHIC THROMBOPHLEBITIS: Status: ACTIVE | Noted: 2022-02-21

## 2022-03-15 ENCOUNTER — TELEPHONE (OUTPATIENT)
Dept: HEMATOLOGY ONCOLOGY | Facility: CLINIC | Age: 62
End: 2022-03-15

## 2022-03-15 ENCOUNTER — APPOINTMENT (OUTPATIENT)
Dept: LAB | Facility: CLINIC | Age: 62
End: 2022-03-15
Payer: COMMERCIAL

## 2022-03-15 DIAGNOSIS — I80.02 THROMBOPHLEBITIS OF SUPERFICIAL VEINS OF LEFT LOWER EXTREMITY: ICD-10-CM

## 2022-03-15 LAB — DEPRECATED AT III PPP: 97 % OF NORMAL (ref 92–136)

## 2022-03-15 PROCEDURE — 85300 ANTITHROMBIN III ACTIVITY: CPT

## 2022-03-15 PROCEDURE — 85301 ANTITHROMBIN III ANTIGEN: CPT

## 2022-03-15 PROCEDURE — 36415 COLL VENOUS BLD VENIPUNCTURE: CPT

## 2022-03-15 NOTE — TELEPHONE ENCOUNTER
Appointment Cancellation Or Reschedule     Person calling in Patient    Provider Jose Hess   Office Visit Date and Time 3/18/22 @ 10:30am   Office Visit Location [facility]   Did patient want to reschedule their office appointment? If so, when was it scheduled to? Yes 4/11/22 @ 8am   Is this patient calling to reschedule an infusion appointment? no   When is their next infusion appointment? na   Is this patient a Chemo patient? no   Reason for Cancellation or Reschedule Unable to keep appt     If the patient is a treatment patient, please route this to the office nurse  If the patient is not on treatment, please route to the office MA

## 2022-03-16 LAB — AT III AG ACT/NOR PPP IA: 94 % (ref 72–124)

## 2022-03-28 DIAGNOSIS — I10 ESSENTIAL HYPERTENSION: ICD-10-CM

## 2022-03-28 RX ORDER — AMLODIPINE BESYLATE 5 MG/1
5 TABLET ORAL DAILY
Qty: 90 TABLET | Refills: 1
Start: 2022-03-28 | End: 2022-08-01 | Stop reason: SDUPTHER

## 2022-04-07 NOTE — PROGRESS NOTES
Hematology/Oncology Outpatient Follow- up Note  Romain Alvarez, 1960, [id number]  4/11/2022        Chief Complaint   Patient presents with    Follow-up       HPI:   Romain Alvarez is a 63year old male with a history of hypertension, bicuspid aortic valve, osteoarthritis, ZIA and superficial thrombophlebitis  He was referred to Hematology for evaluation of abnormal thrombosis panel which detected a mildly decreased antithrombin III activity at 89  Normal being %  Remainder of his Thrombosis panel was normal   His D Dimer was normal    Serum Homocystine was normal  Patient has never had a VTE, however he has had chronic superficial thrombophlebitis dating back to May 2021 after being diagnosed with COVID 19  Previous Hematologic/ Oncologic History:    Work up    Current Hematologic/ Oncologic Treatment:    Observation    Interval History:   The patient presents for follow up  I had recommended that we repeat his Antithrombin III activity and check Antithrombin III antigen level  These results are normal  Patient reports he feels well  He has been exercising  Denies any calf pain or tenderness  No CP or SOB  Denies any concerning symptoms         Labs:   Component Ref Range & Units 3/15/22 0854 11/30/21 1058   AntiThrombIN III Activity 92 - 136 % of Normal 97  89 Low      ANTITHROMBIN III AG 72 - 124 % 94        Lab Results   Component Value Date    WBC 6 27 11/30/2021    HGB 15 3 11/30/2021    HCT 46 4 11/30/2021    MCV 89 11/30/2021     11/30/2021     Lab Results   Component Value Date     09/21/2015    K 3 9 09/21/2021     09/21/2021    CO2 28 09/21/2021    ANIONGAP 6 09/21/2015    BUN 17 09/21/2021    CREATININE 1 20 09/21/2021    GLUCOSE 74 09/21/2015    GLUF 91 09/21/2021    CALCIUM 9 4 09/21/2021    AST 24 06/23/2021    ALT 41 06/23/2021    ALKPHOS 71 06/23/2021    PROT 6 9 09/21/2015    BILITOT 0 90 09/21/2015    EGFR 66 09/21/2021           Review of Systems All other systems reviewed and are negative          Active Problems:   Patient Active Problem List   Diagnosis    Essential hypertension    ZIA (obstructive sleep apnea)    Insufficient sleep syndrome    Hypersomnia    Palpitations    Allergic rhinitis    Chronic bilateral low back pain with bilateral sciatica    Chronic myofascial pain    Esophageal reflux    Hemangioma of skin    Displacement of lumbar intervertebral disc without myelopathy    Lumbar spondylolysis    Multiple pigmented nevi    Primary localized osteoarthritis of right hip    Primary localized osteoarthritis of right knee    Rectus diastasis    Chronic pansinusitis    Arthritis of right hip    Lumbar radiculopathy    Squamous cell skin cancer- scalp    Primary osteoarthritis of both hands    Thrombophlebitis of superficial veins of left lower extremity    Abrasion of anterior left lower leg    Hematoma of left lower leg    Bicuspid aortic valve    Aortopathy associated with bicuspid aortic valve    Adult congenital heart disease    Pain of left calf    Chronic pain of left knee    Recurrent idiopathic thrombophlebitis    Blood clotting disorder (HCC)       Past Medical History:   Past Medical History:   Diagnosis Date    Arthritis     Fingers, lower back ,right hip    Chronic pain     CPAP (continuous positive airway pressure) dependence     GERD (gastroesophageal reflux disease)     Hemangioma of skin     Hypertension     Irritable bowel syndrome     Lumbar spondylolysis     Multiple pigmented nevi     Obstructive sleep apnea syndrome     PONV (postoperative nausea and vomiting)     Premature atrial beats     Premature ventricular beats     Rectus diastasis     Sleep apnea     Umbilical hernia        Surgical History:   Past Surgical History:   Procedure Laterality Date    HERNIA REPAIR      KNEE ARTHROSCOPY Right     OK REPAIR UMBILICAL DAXR,6+R/I,ANDREINA N/A 12/12/2017    Procedure: OPEN UMBILICAL HERNIA REPAIR WITH MESH;  Surgeon: León Jordan MD;  Location:  MAIN OR;  Service: General    WISDOM TOOTH EXTRACTION Left        Family History:    Family History   Problem Relation Age of Onset    Rheum arthritis Mother     Osteoarthritis Mother     Thrombocytopenia Mother     Heart disease Father     Hypertension Father     Coronary artery disease Father     No Known Problems Sister     No Known Problems Brother        Cancer-related family history is not on file  Social History:   Social History     Socioeconomic History    Marital status: /Civil Union     Spouse name: Not on file    Number of children: Not on file    Years of education: Not on file    Highest education level: Not on file   Occupational History    Not on file   Tobacco Use    Smoking status: Never Smoker    Smokeless tobacco: Never Used   Vaping Use    Vaping Use: Never used   Substance and Sexual Activity    Alcohol use: Yes     Alcohol/week: 4 0 standard drinks     Types: 4 Cans of beer per week    Drug use: No    Sexual activity: Yes     Partners: Female     Birth control/protection: None   Other Topics Concern    Not on file   Social History Narrative    Not on file     Social Determinants of Health     Financial Resource Strain: Low Risk     Difficulty of Paying Living Expenses: Not hard at all   Food Insecurity: No Food Insecurity    Worried About [facility] in the Last Year: Never true    [facility] in the Last Year: Never true   Transportation Needs: No Transportation Needs    Lack of Transportation (Medical): No    Lack of Transportation (Non-Medical): No   Physical Activity: Sufficiently Active    Days of Exercise per Week: 3 days    Minutes of Exercise per Session: 60 min   Stress: Stress Concern Present    Feeling of Stress :  To some extent   Social Connections: Socially Integrated    Frequency of Communication with Friends and Family: More than three times a week    Frequency of Social Gatherings with Friends and Family: Once a week    Attends Scientologist Services: More than 4 times per year    Active Member of [employer] or Organizations: Yes    Attends Club or Organization Meetings: More than 4 times per year    Marital Status:    Intimate Partner Violence: Not At Risk    Fear of Current or Ex-Partner: No    Emotionally Abused: No    Physically Abused: No    Sexually Abused: No   Housing Stability: Not on file       Current Medications:   Current Outpatient Medications   Medication Sig Dispense Refill    amLODIPine (NORVASC) 5 mg tablet Take 1 tablet (5 mg total) by mouth daily 90 tablet 1    chlorthalidone 25 mg tablet Take 0 5 tablets (12 5 mg total) by mouth daily 30 tablet 6    pantoprazole (PROTONIX) 40 mg tablet Take 40 mg by mouth daily      atorvastatin (LIPITOR) 40 mg tablet Take 40 mg by mouth daily (Patient not taking: Reported on 2/16/2022 )      clotrimazole (LOTRIMIN) 1 % cream Apply topically 2 (two) times a day (Patient not taking: Reported on 4/11/2022 ) 30 g 2    predniSONE 20 mg tablet Take 3 tablets for 2 days then 2 tablets for 3 days then 1 tablet for 3 days then stop (Patient not taking: Reported on 2/16/2022 ) 15 tablet 0     No current facility-administered medications for this visit  Allergies: Allergies   Allergen Reactions    Lisinopril Swelling    Ibuprofen Rash    Penicillins Rash     Other reaction(s): Rash       Physical Exam:  /88 (BP Location: Left arm, Patient Position: Sitting, Cuff Size: Adult)   Pulse 64   Temp 97 7 °F (36 5 °C) (Tympanic)   Resp 16   Ht 5' 7" (1 702 m)   Wt 93 kg (205 lb)   SpO2 97%   BMI 32 11 kg/m²   Body surface area is 2 04 meters squared  Wt Readings from Last 3 Encounters:   04/11/22 93 kg (205 lb)   02/16/22 93 9 kg (207 lb)   11/30/21 94 kg (207 lb 3 2 oz)           Physical Exam  Constitutional:       General: He is not in acute distress       Appearance: He is well-developed  He is not diaphoretic  HENT:      Head: Normocephalic and atraumatic  Mouth/Throat:      Pharynx: No oropharyngeal exudate  Eyes:      General: No scleral icterus  Pupils: Pupils are equal, round, and reactive to light  Cardiovascular:      Rate and Rhythm: Normal rate and regular rhythm  Heart sounds: No murmur heard  Pulmonary:      Effort: Pulmonary effort is normal  No respiratory distress  Breath sounds: Normal breath sounds  Abdominal:      General: Bowel sounds are normal  There is no distension  Palpations: Abdomen is soft  There is no mass  Tenderness: There is no abdominal tenderness  Musculoskeletal:         General: Normal range of motion  Cervical back: Normal range of motion and neck supple  Lymphadenopathy:      Cervical: No cervical adenopathy  Skin:     General: Skin is warm and dry  Neurological:      General: No focal deficit present  Mental Status: He is alert and oriented to person, place, and time  Psychiatric:         Mood and Affect: Mood normal          Behavior: Behavior normal          Thought Content: Thought content normal          Judgment: Judgment normal          Assessment / Plan:    1  Thrombophlebitis of superficial veins of left lower extremity      The patient is a 63year old male with a history of hypertension, bicuspid aortic valve, osteoarthritis, IZA and superficial thrombophlebitis  He was referred to Hematology for evaluation of abnormal thrombosis panel which detected a mildly decreased antithrombin III activity at 89  Normal being %  Remainder of his Thrombosis panel was normal   His D Dimer was normal    Serum Homocystine was normal  Patient has never had a VTE, however he has had chronic superficial thrombophlebitis dating back to May 2021 after being diagnosed with COVID 19  Repeat Antithrombin III activity was normal range   Antithrombin III antigen is also normal  He does not have antithrombin III deficiency or any evidence of a clotting disorder  I suspect his superficial thrombophlebitis may have been a result of COVID infection  This appears to be resolved and he is without symptoms of calf pain, swelling, tenderness today     Patient does not need routine follow up with hematology  We did discuss risk factors for VTE  He is at no more risk for clotting the general population  He may follow with hematology on an as needed basis  Patient verbalized understanding and was in agreement with plan of care      Barriers to care None  Patient  able to self care    Portions of the record may have been created with voice recognition software  Occasional wrong word or "sound a like" substitutions may have occurred due to the inherent limitations of voice recognition software  Read the chart carefully and recognize, using context, where substitutions have occurred

## 2022-04-11 ENCOUNTER — OFFICE VISIT (OUTPATIENT)
Dept: HEMATOLOGY ONCOLOGY | Facility: HOSPITAL | Age: 62
End: 2022-04-11
Payer: COMMERCIAL

## 2022-04-11 VITALS
HEIGHT: 67 IN | RESPIRATION RATE: 16 BRPM | BODY MASS INDEX: 32.18 KG/M2 | SYSTOLIC BLOOD PRESSURE: 140 MMHG | HEART RATE: 64 BPM | DIASTOLIC BLOOD PRESSURE: 88 MMHG | WEIGHT: 205 LBS | TEMPERATURE: 97.7 F | OXYGEN SATURATION: 97 %

## 2022-04-11 DIAGNOSIS — I80.02 THROMBOPHLEBITIS OF SUPERFICIAL VEINS OF LEFT LOWER EXTREMITY: Primary | ICD-10-CM

## 2022-04-11 PROCEDURE — 99214 OFFICE O/P EST MOD 30 MIN: CPT | Performed by: NURSE PRACTITIONER

## 2022-04-11 PROCEDURE — 1036F TOBACCO NON-USER: CPT | Performed by: NURSE PRACTITIONER

## 2022-04-11 PROCEDURE — 3008F BODY MASS INDEX DOCD: CPT | Performed by: NURSE PRACTITIONER

## 2022-06-20 ENCOUNTER — OFFICE VISIT (OUTPATIENT)
Dept: SURGERY | Facility: HOSPITAL | Age: 62
End: 2022-06-20
Payer: COMMERCIAL

## 2022-06-20 VITALS
WEIGHT: 203 LBS | HEART RATE: 57 BPM | BODY MASS INDEX: 31.86 KG/M2 | HEIGHT: 67 IN | TEMPERATURE: 97.7 F | SYSTOLIC BLOOD PRESSURE: 126 MMHG | DIASTOLIC BLOOD PRESSURE: 85 MMHG

## 2022-06-20 DIAGNOSIS — R10.31 RIGHT GROIN PAIN: Primary | ICD-10-CM

## 2022-06-20 PROCEDURE — 99243 OFF/OP CNSLTJ NEW/EST LOW 30: CPT | Performed by: SURGERY

## 2022-07-01 NOTE — PROGRESS NOTES
Assessment/Plan:   Lorri Ortega is a 63 y o male who is here for Groin Pain (Established patient here for evaluation of right side groin pain-Had right inguinal hernia surgery done at [facility] around 2008)    Plan:  Right groin pain - no obvious recurrence, ? Ilioinguinal nerve pain, discussed nerve block risks and benefits and patient would like to consider his options    Preoperative Clearance: None    HPI:  Lorri Ortega is a 63 y o male who was referred for evaluation of Groin Pain (Established patient here for evaluation of right side groin pain-Had right inguinal hernia surgery done at [facility] around 2008)    Currently right groin pain       ROS:  General ROS: negative  negative for - chills, fatigue, fever or night sweats, weight loss  Respiratory ROS: no cough, shortness of breath, or wheezing  Cardiovascular ROS: no chest pain or dyspnea on exertion  Genito-Urinary ROS: no dysuria, trouble voiding, or hematuria  Musculoskeletal ROS: negative for - gait disturbance, joint pain or muscle pain  Neurological ROS: no TIA or stroke symptoms  Right groin pain    [unfilled]  Lisinopril, Ibuprofen, and Penicillins    Current Outpatient Medications:     amLODIPine (NORVASC) 5 mg tablet, Take 1 tablet (5 mg total) by mouth daily, Disp: 90 tablet, Rfl: 1    atorvastatin (LIPITOR) 40 mg tablet, Take 40 mg by mouth daily (Patient not taking: Reported on 2/16/2022 ), Disp: , Rfl:     chlorthalidone 25 mg tablet, Take 0 5 tablets (12 5 mg total) by mouth daily, Disp: 30 tablet, Rfl: 6    clotrimazole (LOTRIMIN) 1 % cream, Apply topically 2 (two) times a day (Patient not taking: Reported on 4/11/2022 ), Disp: 30 g, Rfl: 2    pantoprazole (PROTONIX) 40 mg tablet, Take 40 mg by mouth daily, Disp: , Rfl:     predniSONE 20 mg tablet, Take 3 tablets for 2 days then 2 tablets for 3 days then 1 tablet for 3 days then stop (Patient not taking: Reported on 2/16/2022 ), Disp: 15 tablet, Rfl: 0  Past Medical History: Diagnosis Date    Arthritis     Fingers, lower back ,right hip    Chronic pain     CPAP (continuous positive airway pressure) dependence     GERD (gastroesophageal reflux disease)     Hemangioma of skin     Hypertension     Irritable bowel syndrome     Lumbar spondylolysis     Multiple pigmented nevi     Obstructive sleep apnea syndrome     PONV (postoperative nausea and vomiting)     Premature atrial beats     Premature ventricular beats     Rectus diastasis     Sleep apnea     Umbilical hernia      Past Surgical History:   Procedure Laterality Date    HERNIA REPAIR      KNEE ARTHROSCOPY Right     NM REPAIR UMBILICAL PUKY,5+J/Y,HWWGL N/A 12/12/2017    Procedure: OPEN UMBILICAL HERNIA REPAIR WITH MESH;  Surgeon: Stephanie Beckett MD;  Location:  MAIN OR;  Service: General    WISDOM TOOTH EXTRACTION Left      Family History   Problem Relation Age of Onset    Rheum arthritis Mother     Osteoarthritis Mother     Thrombocytopenia Mother     Heart disease Father     Hypertension Father     Coronary artery disease Father     No Known Problems Sister     No Known Problems Brother       reports that he has never smoked  He has never used smokeless tobacco  He reports current alcohol use of about 4 0 standard drinks of alcohol per week  He reports that he does not use drugs  Labs:   Lab Results   Component Value Date    WBC 6 27 11/30/2021    WBC 5 77 06/23/2021    WBC 5 57 01/17/2020    WBC 5 73 09/21/2015    HGB 15 3 11/30/2021    HGB 14 7 06/23/2021    HGB 14 2 01/17/2020    HGB 14 6 09/21/2015     11/30/2021     06/23/2021     01/17/2020     09/21/2015     Lab Results   Component Value Date    ALT 41 06/23/2021    ALT 43 01/17/2020    ALT 45 10/16/2017    ALT 56 09/21/2015    AST 24 06/23/2021    AST 32 01/17/2020    AST 25 10/16/2017    AST 30 09/21/2015     This SmartLink has not been configured with any valid records         PHYSICAL EXAM  General Appearance:    Alert, cooperative, no distress,    Head:    Normocephalic without obvious abnormality   Eyes:    PERRL, conjunctiva/corneas clear, EOM's intact        Neck:   Supple, no adenopathy, no JVD   Back:     Symmetric, no spinal or CVA tenderness   Lungs:     Clear to auscultation bilaterally, no wheezing or rhonchi   Heart:    Regular rate and rhythm, S1 and S2 normal, no murmur   Abdomen:     Soft +BS ND TTP in right groin no evidence of recurrence   Extremities:   Extremities normal  No clubbing, cyanosis or edema   Psych:   Normal Affect, AOx3  Neurologic:  Skin:   CNII-XII intact  Strength symmetric, speech intact    Warm, dry, intact, no visible rashes or lesions         Physical Exam              Some portions of this record may have been generated with voice recognition software  There may be translation, syntax,  or grammatical errors  Occasional wrong word or "sound-a-like" substitutions may have occurred due to the inherent limitations of the voice recognition software  Read the chart carefully and recognize, using context, where substitutions may have occurred  If you have any questions, please contact the dictating provider for clarification or correction, as needed  This encounter has been coded by a non-certified coder

## 2022-08-01 DIAGNOSIS — I10 ESSENTIAL HYPERTENSION: ICD-10-CM

## 2022-08-01 RX ORDER — AMLODIPINE BESYLATE 10 MG/1
10 TABLET ORAL DAILY
OUTPATIENT
Start: 2022-08-01

## 2022-08-01 RX ORDER — AMLODIPINE BESYLATE 5 MG/1
5 TABLET ORAL DAILY
Qty: 90 TABLET | Refills: 1 | Status: SHIPPED | OUTPATIENT
Start: 2022-08-01

## 2022-10-05 ENCOUNTER — VBI (OUTPATIENT)
Dept: ADMINISTRATIVE | Facility: OTHER | Age: 62
End: 2022-10-05

## 2022-12-05 ENCOUNTER — APPOINTMENT (OUTPATIENT)
Dept: RADIOLOGY | Facility: MEDICAL CENTER | Age: 62
End: 2022-12-05

## 2022-12-05 ENCOUNTER — OFFICE VISIT (OUTPATIENT)
Dept: OBGYN CLINIC | Facility: MEDICAL CENTER | Age: 62
End: 2022-12-05

## 2022-12-05 VITALS
BODY MASS INDEX: 32.96 KG/M2 | HEIGHT: 67 IN | DIASTOLIC BLOOD PRESSURE: 89 MMHG | SYSTOLIC BLOOD PRESSURE: 131 MMHG | WEIGHT: 210 LBS | HEART RATE: 60 BPM

## 2022-12-05 DIAGNOSIS — M75.81 TENDINITIS OF RIGHT ROTATOR CUFF: Primary | ICD-10-CM

## 2022-12-05 DIAGNOSIS — M25.511 RIGHT SHOULDER PAIN, UNSPECIFIED CHRONICITY: ICD-10-CM

## 2022-12-05 NOTE — PROGRESS NOTES
Ortho Sports Medicine Shoulder Visit     Assesment:   right shoulder calcific tendonitis vs small rotator cuff tear     Plan:    Conservative treatment:    Ice to shoulder 1-2 times daily, for 20 minutes at a time  PT for ROM and strengthening to shoulder, rotator cuff, scapular stabilizers  See back and if pain continues in shoulder then would consider CSI vs MRI to evaluate for rotator cuff tear  See back in 6 weeks     Imaging: All imaging from today was reviewed by myself and explained to the patient  Injection:    No Injection planned at this time  May consider future corticosteroid injection depending on clinical exam/imaging  Surgery:     No surgery is recommended at this point, continue with conservative treatment plan as noted  History of Present Illness: The patient is a 63 y o , right hand dominant male whose occupation is unknown, referred to me by themself, seen in clinic for evaluation of right shoulder pain  The patient denies a history of diabetes  The patient denies a history of thyroid disorder  Pain is located anterior, lateral  The pain has been present for years on and off  The patient states around 2 weeks ago while working out doing push ups he experienced a sharp pain in anterior shoulder  Since then pain lifting arm to shoulder level and bringing down from overhead position he experiences pain  Wakes up in morning with generalized shoulder stiffness, not able to lay on right shoulder at night due to pain  He remains active golfing and working out  The pain is characterized as sharp, achy  The pain is present daily  Pain is improved by rest, ice and NSAIDS  Pain is aggravated by overhead activity, reaching back, rotation, lifting  and exercising  Symptoms include clicking  The patient denies weakness  The patient denies numbness and tingling  The patient has tried rest, ice and NSAIDS            Shoulder Surgical History:  None    Past Medical, Social and Family History:  Past Medical History:   Diagnosis Date   • Arthritis     Fingers, lower back ,right hip   • Chronic pain    • CPAP (continuous positive airway pressure) dependence    • GERD (gastroesophageal reflux disease)    • Hemangioma of skin    • Hypertension    • Irritable bowel syndrome    • Lumbar spondylolysis    • Multiple pigmented nevi    • Obstructive sleep apnea syndrome    • PONV (postoperative nausea and vomiting)    • Premature atrial beats    • Premature ventricular beats    • Rectus diastasis    • Sleep apnea    • Umbilical hernia      Past Surgical History:   Procedure Laterality Date   • HERNIA REPAIR     • KNEE ARTHROSCOPY Right    • UT REPAIR UMBILICAL ECDP,4+E/X,YKEFJ N/A 12/12/2017    Procedure: OPEN UMBILICAL HERNIA REPAIR WITH MESH;  Surgeon: Andi Lundborg, MD;  Location:  MAIN OR;  Service: General   • WISDOM TOOTH EXTRACTION Left      Allergies   Allergen Reactions   • Lisinopril Swelling   • Ibuprofen Rash   • Penicillins Rash     Other reaction(s): Rash     Current Outpatient Medications on File Prior to Visit   Medication Sig Dispense Refill   • amLODIPine (NORVASC) 5 mg tablet Take 1 tablet (5 mg total) by mouth daily 90 tablet 1   • chlorthalidone 25 mg tablet Take 0 5 tablets (12 5 mg total) by mouth daily 30 tablet 6   • pantoprazole (PROTONIX) 40 mg tablet Take 40 mg by mouth daily     • atorvastatin (LIPITOR) 40 mg tablet Take 40 mg by mouth daily (Patient not taking: Reported on 2/16/2022)     • clotrimazole (LOTRIMIN) 1 % cream Apply topically 2 (two) times a day (Patient not taking: Reported on 4/11/2022) 30 g 2   • predniSONE 20 mg tablet Take 3 tablets for 2 days then 2 tablets for 3 days then 1 tablet for 3 days then stop (Patient not taking: Reported on 2/16/2022 ) 15 tablet 0     No current facility-administered medications on file prior to visit       Social History     Socioeconomic History   • Marital status: /Civil [employer]     Spouse name: Not on file   • Number of children: Not on file   • Years of education: Not on file   • Highest education level: Not on file   Occupational History   • Not on file   Tobacco Use   • Smoking status: Never   • Smokeless tobacco: Never   Vaping Use   • Vaping Use: Never used   Substance and Sexual Activity   • Alcohol use: Yes     Alcohol/week: 4 0 standard drinks     Types: 4 Cans of beer per week   • Drug use: No   • Sexual activity: Yes     Partners: Female     Birth control/protection: None   Other Topics Concern   • Not on file   Social History Narrative   • Not on file     Social Determinants of Health     Financial Resource Strain: Not on file   Food Insecurity: Not on file   Transportation Needs: Not on file   Physical Activity: Not on file   Stress: Not on file   Social Connections: Not on file   Intimate Partner Violence: Not on file   Housing Stability: Not on file         I have reviewed the past medical, surgical, social and family history, medications and allergies as documented in the EMR  Review of systems: ROS is negative other than that noted in the HPI  Constitutional: Negative for fatigue and fever  HENT: Negative for sore throat  Respiratory: Negative for shortness of breath  Cardiovascular: Negative for chest pain  Gastrointestinal: Negative for abdominal pain  Endocrine: Negative for cold intolerance and heat intolerance  Genitourinary: Negative for flank pain  Musculoskeletal: Negative for back pain  Skin: Negative for rash  Allergic/Immunologic: Negative for immunocompromised state  Neurological: Negative for dizziness  Psychiatric/Behavioral: Negative for agitation  Physical Exam:    Blood pressure 131/89, pulse 60, height 5' 7" (1 702 m), weight 95 3 kg (210 lb)      General/Constitutional: NAD, well developed, well nourished  HENT: Normocephalic, atraumatic  CV: Intact distal pulses, regular rate  Resp: No respiratory distress or labored breathing  Lymphatic: No lymphadenopathy palpated  Neuro: Alert and Oriented x 3, no focal deficits  Psych: Normal mood, normal affect, normal judgement, normal behavior  Skin: Warm, dry, no rashes, no erythema     Shoulder Exam (focused): Shoulder focused exam:       RIGHT LEFT    Scapula Atrophy Negative Negative     Winging Negative Negative     Protraction Negative Negative    Rotator cuff SS 5/5 5/5     IS 5/5 5/5     SubS 5/5 5/5    ROM  170     ER0 60 60     ER90 90 90     IR90 40 40     IRb T6 T6    TTP: AC Negative Negative     Biceps Positive Negative     Coracoid Negative Negative    Special Tests: O'Briens Negative Negative     Walden-shear Negative Negative     Cross body Adduction Negative Negative     Speeds  Negative Negative     Mervat's Negative Negative     Whipple Positive Negative       Neer Negative Negative     Barlow Negative Negative    Instability: Apprehension & relocation not tested not tested     Load & shift not tested not tested    Other: Crank Negative Negative               UE NV Exam: +2 Radial pulses bilaterally  Sensation intact to light touch C5-T1 bilaterally, Radial/median/ulnar nerve motor intact      Bilateral elbow, wrist, and and forearm ROM full, painless with passive ROM, no ttp or crepitance throughout extremities below shoulder joint    Cervical ROM is full without pain, numbness or tingling      Shoulder Imaging    X-rays of the right shoulder were reviewed, which demonstrate mild arthritis glenohumeral and AC joints with small calcification superolateral humeral head which may represent calcific deposit  I have reviewed the radiology report and agree with their impression        Scribe Attestation    I,:  Reva Phillips am acting as a scribe while in the presence of the attending physician :       I,:  Rose Marie Allen DO personally performed the services described in this documentation    as scribed in my presence :

## 2022-12-28 ENCOUNTER — OFFICE VISIT (OUTPATIENT)
Dept: FAMILY MEDICINE CLINIC | Facility: CLINIC | Age: 62
End: 2022-12-28

## 2022-12-28 VITALS
RESPIRATION RATE: 18 BRPM | HEIGHT: 67 IN | OXYGEN SATURATION: 99 % | SYSTOLIC BLOOD PRESSURE: 136 MMHG | HEART RATE: 73 BPM | WEIGHT: 208.6 LBS | DIASTOLIC BLOOD PRESSURE: 82 MMHG | BODY MASS INDEX: 32.74 KG/M2 | TEMPERATURE: 98.5 F

## 2022-12-28 DIAGNOSIS — J06.9 VIRAL UPPER RESPIRATORY TRACT INFECTION: ICD-10-CM

## 2022-12-28 DIAGNOSIS — J01.01 ACUTE RECURRENT MAXILLARY SINUSITIS: Primary | ICD-10-CM

## 2022-12-28 LAB
SARS-COV-2 AG UPPER RESP QL IA: NEGATIVE
VALID CONTROL: NORMAL

## 2022-12-28 RX ORDER — DOXYCYCLINE 100 MG/1
100 CAPSULE ORAL 2 TIMES DAILY
Qty: 14 CAPSULE | Refills: 0 | Status: SHIPPED | OUTPATIENT
Start: 2022-12-28 | End: 2023-01-04

## 2022-12-28 RX ORDER — DOXEPIN HYDROCHLORIDE 10 MG/ML
SOLUTION ORAL
COMMUNITY
Start: 2022-11-28 | End: 2022-12-28

## 2022-12-28 NOTE — PROGRESS NOTES
Name: Makayla Nye      : 1960      MRN: [id number]  Encounter Provider: SOM Grimm  Encounter Date: 2022   Encounter department: [facility]    Assessment & Plan     1  Acute recurrent maxillary sinusitis  -     doxycycline monohydrate (MONODOX) 100 mg capsule; Take 1 capsule (100 mg total) by mouth 2 (two) times a day for 7 days    2  Viral upper respiratory tract infection  -     POCT Rapid Covid Ag           Subjective      Sinusitis  This is a new problem  The current episode started in the past 7 days  The problem is unchanged  There has been no fever  His pain is at a severity of 5/10  The pain is moderate  Associated symptoms include congestion and sinus pressure  Pertinent negatives include no chills, hoarse voice or swollen glands  (Teeth pain - post nasal drip) Past treatments include saline sprays (saline rinse )  The treatment provided no relief  Review of Systems   Constitutional: Negative  Negative for chills and fever  HENT: Positive for congestion, postnasal drip, sinus pressure and sinus pain  Negative for hoarse voice  Eyes: Negative  Respiratory: Negative  Cardiovascular: Negative  Gastrointestinal: Negative  Endocrine: Negative  Genitourinary: Negative  Musculoskeletal: Negative  Skin: Negative  Allergic/Immunologic: Negative  Neurological: Negative  Hematological: Negative  Psychiatric/Behavioral: Negative          Current Outpatient Medications on File Prior to Visit   Medication Sig   • amLODIPine (NORVASC) 5 mg tablet Take 1 tablet (5 mg total) by mouth daily   • chlorthalidone 25 mg tablet Take 0 5 tablets (12 5 mg total) by mouth daily   • pantoprazole (PROTONIX) 40 mg tablet Take 40 mg by mouth daily   • [DISCONTINUED] atorvastatin (LIPITOR) 40 mg tablet Take 40 mg by mouth daily (Patient not taking: Reported on 2022)   • [DISCONTINUED] clotrimazole (LOTRIMIN) 1 % cream Apply topically 2 (two) times a day (Patient not taking: Reported on 4/11/2022)   • [DISCONTINUED] doxepin (SINEquan) 10 mg/mL solution TAKE 0 4 ML BY MOUTH AT BEDTIME - MAY INCREASE by 0 1 ML EACH WEEK TO final DOSE OF 0 6ml (6mg) AT BEDTIME (Patient not taking: Reported on 12/28/2022)   • [DISCONTINUED] predniSONE 20 mg tablet Take 3 tablets for 2 days then 2 tablets for 3 days then 1 tablet for 3 days then stop (Patient not taking: Reported on 2/16/2022)       Objective     /82 (BP Location: Left arm, Patient Position: Sitting, Cuff Size: Standard)   Pulse 73   Temp 98 5 °F (36 9 °C) (Tympanic)   Resp 18   Ht 5' 7" (1 702 m)   Wt 94 6 kg (208 lb 9 6 oz)   SpO2 99%   BMI 32 67 kg/m²     Physical Exam  Vitals and nursing note reviewed  Constitutional:       Appearance: He is ill-appearing  HENT:      Head: Normocephalic and atraumatic  Right Ear: Tympanic membrane, ear canal and external ear normal  There is no impacted cerumen  Left Ear: Tympanic membrane, ear canal and external ear normal  There is no impacted cerumen  Nose: Nasal tenderness, mucosal edema and congestion present  Right Turbinates: Enlarged and swollen  Left Turbinates: Enlarged and swollen  Right Sinus: Maxillary sinus tenderness present  Mouth/Throat:      Mouth: Mucous membranes are moist       Pharynx: Oropharynx is clear  No oropharyngeal exudate  Eyes:      General:         Right eye: No discharge  Left eye: No discharge  Extraocular Movements: Extraocular movements intact  Conjunctiva/sclera: Conjunctivae normal    Cardiovascular:      Rate and Rhythm: Normal rate and regular rhythm  Pulses: Normal pulses  Heart sounds: Normal heart sounds  No murmur heard  Pulmonary:      Effort: Pulmonary effort is normal       Breath sounds: Normal breath sounds  Abdominal:      General: Abdomen is flat  Bowel sounds are normal       Palpations: Abdomen is soft  Tenderness:  There is no right CVA tenderness or left CVA tenderness  Musculoskeletal:         General: Normal range of motion  Cervical back: Normal range of motion and neck supple  Skin:     General: Skin is warm and dry  Capillary Refill: Capillary refill takes less than 2 seconds  Neurological:      Mental Status: He is alert and oriented to person, place, and time  Psychiatric:         Mood and Affect: Mood normal          Behavior: Behavior normal          Thought Content:  Thought content normal          Judgment: Judgment normal        SOM Portillo

## 2023-01-16 ENCOUNTER — OFFICE VISIT (OUTPATIENT)
Dept: OBGYN CLINIC | Facility: MEDICAL CENTER | Age: 63
End: 2023-01-16

## 2023-01-16 VITALS
DIASTOLIC BLOOD PRESSURE: 82 MMHG | HEART RATE: 63 BPM | BODY MASS INDEX: 32.65 KG/M2 | SYSTOLIC BLOOD PRESSURE: 123 MMHG | HEIGHT: 67 IN | WEIGHT: 208 LBS

## 2023-01-16 DIAGNOSIS — M24.811 INTERNAL DERANGEMENT OF RIGHT SHOULDER: Primary | ICD-10-CM

## 2023-01-16 NOTE — PROGRESS NOTES
Ortho Sports Medicine Shoulder Follow Up Visit     Assesment:   63 y o  male right shoulder rotator cuff tear vs labral tear    Plan:    MRI right shoulder has been ordered  Follow up after MRI has been performed to review diagnosis and treatment options    Conservative treatment:    Ice to shoulder 1-2 times daily, for 20 minutes at a time  Home exercise program for shoulder, including ROM and strenthening  Instructions given to patient of what exercises to perform  Imaging: All imaging from today was reviewed by myself and explained to the patient  We will obtain an MRI of the shoulder to rule out labral tear vs rotator cuff tear  Injection:    No Injection planned at this time  Surgery:     No surgery is recommended at this point, continue with conservative treatment plan as noted  Follow up:    No follow-ups on file  Chief Complaint   Patient presents with   • Right Shoulder - Follow-up     Has been doing Pt , still not where he should be ( can not do pushups)          History of Present Illness: The patient is returns for follow up of right shoulder pain  Since the prior visit, He reports significant improvement  Pain is improved by rest, ice, NSAIDS and physical therapy  Pain is aggravated by overhead activity, reaching back, lifting  and exercising  Symptoms include clicking, popping and cracking  The patient denies weakness  The patient has tried rest, ice, NSAIDS and physical therapy          Shoulder Surgical History:  None    Past Medical, Social and Family History:  Past Medical History:   Diagnosis Date   • Arthritis     Fingers, lower back ,right hip   • Chronic pain    • CPAP (continuous positive airway pressure) dependence    • GERD (gastroesophageal reflux disease)    • Hemangioma of skin    • Hypertension    • Irritable bowel syndrome    • Lumbar spondylolysis    • Multiple pigmented nevi    • Obstructive sleep apnea syndrome    • PONV (postoperative nausea and vomiting)    • Premature atrial beats    • Premature ventricular beats    • Rectus diastasis    • Sleep apnea    • Umbilical hernia      Past Surgical History:   Procedure Laterality Date   • HERNIA REPAIR     • KNEE ARTHROSCOPY Right    • ID RPR UMBILICAL HRNA 5 YRS/> REDUCIBLE N/A 12/12/2017    Procedure: OPEN UMBILICAL HERNIA REPAIR WITH MESH;  Surgeon: Gaby Escoto MD;  Location:  MAIN OR;  Service: General   • WISDOM TOOTH EXTRACTION Left      Allergies   Allergen Reactions   • Lisinopril Swelling   • Ibuprofen Rash   • Penicillins Rash     Other reaction(s): Rash     Current Outpatient Medications on File Prior to Visit   Medication Sig Dispense Refill   • amLODIPine (NORVASC) 5 mg tablet Take 1 tablet (5 mg total) by mouth daily 90 tablet 1   • chlorthalidone 25 mg tablet Take 0 5 tablets (12 5 mg total) by mouth daily 30 tablet 6   • pantoprazole (PROTONIX) 40 mg tablet Take 40 mg by mouth daily       No current facility-administered medications on file prior to visit  Social History     Socioeconomic History   • Marital status: /Civil Union     Spouse name: Not on file   • Number of children: Not on file   • Years of education: Not on file   • Highest education level: Not on file   Occupational History   • Not on file   Tobacco Use   • Smoking status: Never   • Smokeless tobacco: Never   Vaping Use   • Vaping Use: Never used   Substance and Sexual Activity   • Alcohol use:  Yes     Alcohol/week: 4 0 standard drinks     Types: 4 Cans of beer per week   • Drug use: No   • Sexual activity: Yes     Partners: Female     Birth control/protection: None   Other Topics Concern   • Not on file   Social History Narrative   • Not on file     Social Determinants of Health     Financial Resource Strain: Not on file   Food Insecurity: Not on file   Transportation Needs: Not on file   Physical Activity: Not on file   Stress: Not on file   Social Connections: Not on file   Intimate Partner Violence: Not At Risk   • Fear of Current or Ex-Partner: No   • Emotionally Abused: No   • Physically Abused: No   • Sexually Abused: No   Housing Stability: Not on file       I have reviewed the past medical, surgical, social and family history, medications and allergies as documented in the EMR  Review of systems: ROS is negative other than that noted in the HPI  Constitutional: Negative for fatigue and fever  Physical Exam:    Blood pressure 123/82, pulse 63, height 5' 7" (1 702 m), weight 94 3 kg (208 lb)      General/Constitutional: NAD, well developed, well nourished  HENT: Normocephalic, atraumatic  CV: Intact distal pulses, regular rate  Resp: No respiratory distress or labored breathing  GI: Soft and non-tender   Lymphatic: No lymphadenopathy palpated  Neuro: Alert and Oriented x 3, no focal deficits  Psych: Normal mood, normal affect, normal judgement, normal behavior  Skin: Warm, dry, no rashes, no erythema      Shoulder focused exam:       RIGHT LEFT    Scapula Atrophy Negative Negative     Winging Negative Negative     Protraction Negative Negative    Rotator cuff SS 5/5 5/5     IS 5/5 5/5     SubS 5/5 5/5    ROM     170     ER0 60 60     ER90 90    90     IR90 T6    T6     IRb T6    T6    TTP: AC Negative Negative     Biceps Positive Negative     Coracoid Negative Negative    Special Tests: O'Briens Positive Negative     Walden-shear Negative Negative     Cross body Adduction Negative Negative     Speeds  Negative Negative     Mervat's Negative Negative     Whipple Positive Negative       Neer Positive Negative     Barlow Positive Negative    Instability: Apprehension & relocation not tested not tested     Load & shift not tested not tested    Other: Crank Negative Negative               UE NV Exam: +2 Radial pulses bilaterally  Sensation intact to light touch C5-T1 bilaterally, Radial/median/ulnar nerve motor intact    Cervical ROM is full without pain, numbness or tingling      Shoulder Imaging    No imaging was performed today        Scribe Attestation    I,:   am acting as a scribe while in the presence of the attending physician :       I,:   personally performed the services described in this documentation    as scribed in my presence :

## 2023-02-03 ENCOUNTER — HOSPITAL ENCOUNTER (OUTPATIENT)
Dept: MRI IMAGING | Facility: HOSPITAL | Age: 63
End: 2023-02-03

## 2023-02-03 DIAGNOSIS — M24.811 INTERNAL DERANGEMENT OF RIGHT SHOULDER: ICD-10-CM

## 2023-02-07 ENCOUNTER — OFFICE VISIT (OUTPATIENT)
Dept: SLEEP CENTER | Facility: HOSPITAL | Age: 63
End: 2023-02-07
Attending: INTERNAL MEDICINE

## 2023-02-07 VITALS
DIASTOLIC BLOOD PRESSURE: 82 MMHG | WEIGHT: 208.2 LBS | BODY MASS INDEX: 32.68 KG/M2 | SYSTOLIC BLOOD PRESSURE: 124 MMHG | HEIGHT: 67 IN | HEART RATE: 65 BPM | OXYGEN SATURATION: 98 %

## 2023-02-07 DIAGNOSIS — I10 ESSENTIAL HYPERTENSION: ICD-10-CM

## 2023-02-07 DIAGNOSIS — J30.9 ALLERGIC RHINITIS, UNSPECIFIED SEASONALITY, UNSPECIFIED TRIGGER: ICD-10-CM

## 2023-02-07 DIAGNOSIS — M79.18 CHRONIC MYOFASCIAL PAIN: ICD-10-CM

## 2023-02-07 DIAGNOSIS — E66.9 OBESITY (BMI 30-39.9): ICD-10-CM

## 2023-02-07 DIAGNOSIS — G47.33 OBSTRUCTIVE SLEEP APNEA SYNDROME: Primary | ICD-10-CM

## 2023-02-07 DIAGNOSIS — G89.29 CHRONIC MYOFASCIAL PAIN: ICD-10-CM

## 2023-02-07 DIAGNOSIS — K21.9 GASTROESOPHAGEAL REFLUX DISEASE, UNSPECIFIED WHETHER ESOPHAGITIS PRESENT: ICD-10-CM

## 2023-02-07 DIAGNOSIS — M54.41 CHRONIC BILATERAL LOW BACK PAIN WITH BILATERAL SCIATICA: ICD-10-CM

## 2023-02-07 DIAGNOSIS — M54.42 CHRONIC BILATERAL LOW BACK PAIN WITH BILATERAL SCIATICA: ICD-10-CM

## 2023-02-07 DIAGNOSIS — G89.29 CHRONIC BILATERAL LOW BACK PAIN WITH BILATERAL SCIATICA: ICD-10-CM

## 2023-02-07 DIAGNOSIS — F51.12 INSUFFICIENT SLEEP SYNDROME: ICD-10-CM

## 2023-02-07 RX ORDER — FLUOROURACIL 50 MG/G
1 CREAM TOPICAL EVERY 12 HOURS
COMMUNITY

## 2023-02-07 RX ORDER — ATORVASTATIN CALCIUM 40 MG/1
40 TABLET, FILM COATED ORAL EVERY 24 HOURS
COMMUNITY
Start: 2023-02-01

## 2023-02-07 RX ORDER — CALCIPOTRIENE 50 UG/G
1 OINTMENT TOPICAL EVERY 12 HOURS
COMMUNITY

## 2023-02-07 RX ORDER — VALSARTAN 80 MG/1
80 TABLET ORAL EVERY 24 HOURS
COMMUNITY
Start: 2023-02-01

## 2023-02-07 NOTE — PROGRESS NOTES
Follow-Up Note - [address]  63 y o  male  :1960  YVC:[id number]  DOS:2023    CC: I saw this patient for follow-up in clinic today for Sleep disordered breathing, Coexisting Sleep and Medical Problems  He is using a DreamStation 1 machine, has registered it with [employer] [facility] and is awaiting a replacement  Results of prior studies in 2015: Diagnostic study demonstrated an AHI of 49 5/h  There were also frequent respiratory effort related arousals resulting in an RDI of 61/h  Minimum oxygen saturation was 76% and 32 9% of the study was spent with saturations below 90%  During the subsequent therapeutic study, sleep disordered breathing was remediated with CPAP at 16 cm H2O  PFSH, Problem List, Medications & Allergies were reviewed in EMR  Interval changes: None reported  He  has a past medical history of Arthritis, Chronic pain, CPAP (continuous positive airway pressure) dependence, GERD (gastroesophageal reflux disease), Hemangioma of skin, Hypertension, Irritable bowel syndrome, Lumbar spondylolysis, Multiple pigmented nevi, Obstructive sleep apnea syndrome, PONV (postoperative nausea and vomiting), Premature atrial beats, Premature ventricular beats, Rectus diastasis, Sleep apnea, and Umbilical hernia  He has a current medication list which includes the following prescription(s): amlodipine, atorvastatin, calcipotriene, chlorthalidone, fluorouracil, pantoprazole, and valsartan  PHYSIOLOGICAL DATA REVIEW AND INTERPRETATION: Using PAP > 4 hours/night 100%  Estimated CM 2 7/hour with pressure of 16cm Elizabeth@[employer] percentile; Patient has not been using ozone based devices to sanitize the machine  SUBJECTIVE: With respect to use of PAP, Osmel Boston reports benefiting from use   He is experiencing some adverse effects: dry mouth/throat and dry nose;   Sleep Routine: Osmel Boston reports getting 6-7 hrs sleep; he has no difficulty initiating, but reports diffculty maintaining sleep   He arises spontaneously and usually feels refreshed  Verlon Double reports excessive daytime sleepiness, [feels like napping & does when has the opportunity ] He rated [himself] at Total score: 9 /24 on the Buna Sleepiness Scale  Habits:[ reports that he has never smoked  He has never used smokeless tobacco ], [ reports current alcohol use of about 4 0 standard drinks per week ], [ reports no history of drug use ], Caffeine use:limited; Exercise routine: regular  ROS: reviewed & significant for weight has been stable  Nasal symptoms are controlled  He reported no respiratory or cardiac symptoms  Acid reflux is controlled  He has musculoskeletal aches and pains that can disturb sleep for which he uses meloxicam as needed    EXAM: /82 (BP Location: Left arm, Patient Position: Sitting, Cuff Size: Standard)   Pulse 65   Ht 5' 7" (1 702 m)   Wt 94 4 kg (208 lb 3 2 oz)   SpO2 98%   BMI 32 61 kg/m²     Wt Readings from Last 3 Encounters:   02/07/23 94 4 kg (208 lb 3 2 oz)   01/16/23 94 3 kg (208 lb)   12/28/22 94 6 kg (208 lb 9 6 oz)      Patient is well groomed; well appearing  CNS: Alert, orientated, clear & coherent speech  Psych: cooperative and in no distress  Mental state: Appears normal   H&N: EOMI; NC/AT: No facial pressure marks, no rashes  Skin/Extrem: col & hydration normal; no edema  Resp: Respiratory effort is normal  Physical findings otherwise essentially unchanged from previous  IMPRESSION: Problem List Items & Comorbidities Addressed this Visit    1  Obstructive sleep apnea syndrome  Sleep F/U  - established patient    PAP DME Resupply/Reorder      2  Insufficient sleep syndrome        3  Allergic rhinitis, unspecified seasonality, unspecified trigger        4  Essential hypertension        5  Chronic bilateral low back pain with bilateral sciatica        6  Chronic myofascial pain        7   Gastroesophageal reflux disease, unspecified whether esophagitis present 8  Obesity (BMI 30-39  9)            PLAN:  1  I reviewed results of prior studies and physiologic data with the patient  2  I discussed treatment options with risks and benefits  3  Treatment with  PAP is medically necessary and Apoorva Gagnon is agreable to continue use  4  Care of equipment, methods to improve comfort using PAP and importance of compliance with therapy were discussed  5  Pressure setting: continue 16 cmH2O     6  Rx provided to replace supplies and Care coordinated with DME provider  7  Discussed strategies for weight reduction  8  I also advised allowing sufficient opportunity for sleep  9  Follow-up is advised in 1 year or sooner if needed to monitor progress, compliance and to adjust therapy  Thank you for allowing me to participate in the care of this patient  Sincerely,     Authenticated electronically on 48/91/64   Board Certified Specialist     Portions of the record may have been created with voice recognition software  Occasional wrong word or "sound a like" substitutions may have occurred due to the inherent limitations of voice recognition software  There may also be notations and random deletions of words or characters from malfunctioning software  Read the chart carefully and recognize, using context, where substitutions/deletions have occurred

## 2023-02-07 NOTE — PATIENT INSTRUCTIONS

## 2023-02-08 ENCOUNTER — OFFICE VISIT (OUTPATIENT)
Dept: OBGYN CLINIC | Facility: MEDICAL CENTER | Age: 63
End: 2023-02-08

## 2023-02-08 VITALS
WEIGHT: 208 LBS | BODY MASS INDEX: 32.65 KG/M2 | HEART RATE: 59 BPM | SYSTOLIC BLOOD PRESSURE: 125 MMHG | DIASTOLIC BLOOD PRESSURE: 84 MMHG | HEIGHT: 67 IN

## 2023-02-08 DIAGNOSIS — M75.121 COMPLETE TEAR OF RIGHT ROTATOR CUFF, UNSPECIFIED WHETHER TRAUMATIC: Primary | ICD-10-CM

## 2023-02-08 NOTE — PROGRESS NOTES
Ortho Sports Medicine Shoulder Visit     Assesment:   right shoulder small full thickness tear leading edge of supraspinatus, tiny full thickness tear critical zone of supraspinatus w/o retraction     Plan:    Conservative treatment:    Ice to shoulder 1-2 times daily, for 20 minutes at a time  Continue HEP as shown by physical therapy in past  Discussed CSI if symptoms worsen  Patient wishes to avoid surgery if possible  He remains very functionally  Avoid repetitive overhead activities, heavy strenuous lifting  Imaging: All imaging from today was reviewed by myself and explained to the patient  Injection:    No Injection planned at this time  May consider future corticosteroid injection depending on clinical exam/imaging  Surgery:     No surgery is recommended at this point, continue with conservative treatment plan as noted  History of Present Illness: The patient is returns for follow up of his right shoulder  Patient is here to review MRI of right shoulder  Patient states his shoulder is not doing that bad today  He is maintaining his HEP at gym  He states the activity that bothers him the most is activities reaching away from his body  Pain is improved by rest, ice, NSAIDS and physical therapy  Pain is aggravated by overhead activity, reaching back, lifting  and exercising  Symptoms include clicking, popping and cracking  The patient denies weakness  The patient has tried rest, ice, NSAIDS and physical therapy  I have reviewed the past medical, surgical, social and family history, medications and allergies as documented in the EMR  Review of systems: ROS is negative other than that noted in the HPI  Constitutional: Negative for fatigue and fever     Cardiovascular: Negative for chest pain  Pulmonary: negative for shortness of breath    PMH/PSH:  Past Medical History:   Diagnosis Date   • Arthritis     Fingers, lower back ,right hip   • Chronic pain    • CPAP (continuous positive airway pressure) dependence    • GERD (gastroesophageal reflux disease)    • Hemangioma of skin    • Hypertension    • Irritable bowel syndrome    • Lumbar spondylolysis    • Multiple pigmented nevi    • Obstructive sleep apnea syndrome    • PONV (postoperative nausea and vomiting)    • Premature atrial beats    • Premature ventricular beats    • Rectus diastasis    • Sleep apnea    • Umbilical hernia      Past Surgical History:   Procedure Laterality Date   • HERNIA REPAIR     • KNEE ARTHROSCOPY Right    • IN RPR UMBILICAL HRNA 5 YRS/> REDUCIBLE N/A 12/12/2017    Procedure: OPEN UMBILICAL HERNIA REPAIR WITH MESH;  Surgeon: Baron Dawson MD;  Location:  MAIN OR;  Service: General   • WISDOM TOOTH EXTRACTION Left         Physical Exam:    Blood pressure 125/84, pulse 59, height 5' 7" (1 702 m), weight 94 3 kg (208 lb)  General/Constitutional: NAD, well developed, well nourished  HENT: Normocephalic, atraumatic  CV: Intact distal pulses, regular rate  Resp: No respiratory distress or labored breathing  Lymphatic: No lymphadenopathy palpated  Neuro: Alert and Oriented x 3, no focal deficits  Psych: Normal mood, normal affect, normal judgement, normal behavior  Skin: Warm, dry, no rashes, no erythema     Shoulder Exam (focused):     Shoulder focused exam:       RIGHT LEFT    Scapula Atrophy Negative Negative     Winging Negative Negative     Protraction Negative Negative    Rotator cuff SS 5/5 5/5     IS 5/5 5/5     SubS 5/5 5/5    ROM  170     ER0 60 60     ER90 90 90     IR90 40 40     IRb T6 T6    TTP: AC Negative Negative     Biceps Positive Negative     Coracoid Negative Negative    Special Tests: O'Briens Negative Negative     Walden-shear Negative Negative     Cross body Adduction Negative Negative     Speeds  Negative Negative     Mervat's Negative Negative     Whipple Positive Negative       Neer Negative Negative     Barlow Negative Negative Instability: Apprehension & relocation not tested not tested     Load & shift not tested not tested    Other: Crank Negative Negative               UE NV Exam: +2 Radial pulses bilaterally  Sensation intact to light touch C5-T1 bilaterally, Radial/median/ulnar nerve motor intact    Cervical ROM is full without pain, numbness or tingling      Shoulder Imaging    MRI of the right shoulder were reviewed, which demonstrate full thickness tear anterior leading edge of distal supraspinatus tendon at attachment, tiny full thickness tear posterior fibers of supraspinatus tendon critical zone, no muscle retraction, superior labral degeneration, mild AC OA  I have   reviewed the radiology report and agree with their impression        Scribe Attestation    I,:  Rafael Cunningham am acting as a scribe while in the presence of the attending physician :       I,:  Trina Aguilar, DO personally performed the services described in this documentation    as scribed in my presence :

## 2023-02-09 ENCOUNTER — TELEPHONE (OUTPATIENT)
Dept: SLEEP CENTER | Facility: CLINIC | Age: 63
End: 2023-02-09

## 2023-02-09 ENCOUNTER — APPOINTMENT (OUTPATIENT)
Dept: LAB | Facility: CLINIC | Age: 63
End: 2023-02-09

## 2023-02-09 DIAGNOSIS — E78.6 FAMILIAL LIPOPROTEIN DEFICIENCY: ICD-10-CM

## 2023-02-09 LAB
CHOLEST SERPL-MCNC: 121 MG/DL
HDLC SERPL-MCNC: 36 MG/DL
LDLC SERPL CALC-MCNC: 53 MG/DL (ref 0–100)
NONHDLC SERPL-MCNC: 85 MG/DL
TRIGL SERPL-MCNC: 158 MG/DL

## 2023-02-13 LAB

## 2023-03-20 ENCOUNTER — TELEPHONE (OUTPATIENT)
Dept: FAMILY MEDICINE CLINIC | Facility: CLINIC | Age: 63
End: 2023-03-20

## 2023-03-20 DIAGNOSIS — M19.041 PRIMARY OSTEOARTHRITIS OF BOTH HANDS: ICD-10-CM

## 2023-03-20 DIAGNOSIS — M19.042 PRIMARY OSTEOARTHRITIS OF BOTH HANDS: ICD-10-CM

## 2023-03-20 NOTE — TELEPHONE ENCOUNTER
Pt LVM requesting a refill of meloxicam to [facility]  We have not prescribed since 2021  Spoke to pt via telephone, Pt reports he is unable to take advil so he takes meloxicam instead  He is now out  Advised pt he may need an appointment for the refill but that I would double check  Pt aware Dr Alina Tejada not in office today  CP appointment scheduled for 5/8/23

## 2023-03-21 RX ORDER — MELOXICAM 15 MG/1
15 TABLET ORAL DAILY
Qty: 90 TABLET | Refills: 0 | Status: SHIPPED | OUTPATIENT
Start: 2023-03-21

## 2023-04-25 ENCOUNTER — RA CDI HCC (OUTPATIENT)
Dept: OTHER | Facility: HOSPITAL | Age: 63
End: 2023-04-25

## 2023-04-25 NOTE — PROGRESS NOTES
[facility] 75  coding opportunities       Chart reviewed, no opportunity found: CHART REVIEWED, NO OPPORTUNITY FOUND     Patients Insurance     Commercial Insurance: [employer]

## 2023-05-08 ENCOUNTER — OFFICE VISIT (OUTPATIENT)
Dept: FAMILY MEDICINE CLINIC | Facility: CLINIC | Age: 63
End: 2023-05-08

## 2023-05-08 VITALS
OXYGEN SATURATION: 98 % | BODY MASS INDEX: 32.52 KG/M2 | TEMPERATURE: 97.4 F | HEART RATE: 68 BPM | WEIGHT: 207.2 LBS | SYSTOLIC BLOOD PRESSURE: 120 MMHG | DIASTOLIC BLOOD PRESSURE: 84 MMHG | HEIGHT: 67 IN | RESPIRATION RATE: 16 BRPM

## 2023-05-08 DIAGNOSIS — Z13.29 SCREENING FOR ENDOCRINE, METABOLIC AND IMMUNITY DISORDER: ICD-10-CM

## 2023-05-08 DIAGNOSIS — Z13.6 SCREENING FOR CARDIOVASCULAR CONDITION: ICD-10-CM

## 2023-05-08 DIAGNOSIS — I10 ESSENTIAL HYPERTENSION: ICD-10-CM

## 2023-05-08 DIAGNOSIS — Z13.228 SCREENING FOR ENDOCRINE, METABOLIC AND IMMUNITY DISORDER: ICD-10-CM

## 2023-05-08 DIAGNOSIS — Z13.0 SCREENING FOR ENDOCRINE, METABOLIC AND IMMUNITY DISORDER: ICD-10-CM

## 2023-05-08 DIAGNOSIS — Q23.1 BICUSPID AORTIC VALVE: ICD-10-CM

## 2023-05-08 DIAGNOSIS — Z00.00 ANNUAL PHYSICAL EXAM: Primary | ICD-10-CM

## 2023-05-08 DIAGNOSIS — Z13.1 SCREENING FOR DIABETES MELLITUS: ICD-10-CM

## 2023-05-08 PROBLEM — I77.819 ECTATIC AORTA (HCC): Status: ACTIVE | Noted: 2023-05-08

## 2023-05-08 RX ORDER — VALSARTAN 80 MG/1
80 TABLET ORAL EVERY 24 HOURS
Qty: 90 TABLET | Refills: 1
Start: 2023-05-08

## 2023-05-08 NOTE — PROGRESS NOTES
[id number] [facility]ACT[facility]    NAME: Elida Capellan  AGE: 63 y o  SEX: male  : 1960     DATE: 2023     Assessment and Plan:     Problem List Items Addressed This Visit        Cardiovascular and Mediastinum    Bicuspid aortic valve with ascending aorta 4 0 to 4 5 cm in diameter     The patient is currently stable and will continue to follow-up with his cardiologist as scheduled  We will see him back in the office as scheduled  He will continue to follow-up for his yearly echocardiograms  Essential hypertension     The patient's blood pressure is mildly elevated today in the office  He states that he stopped taking the valsartan 80 mg daily for no apparent reason  He felt like he was taking too many pills  He will go back on the medication 80 mg daily to better regulate his blood pressure  He is going to contact his cardiologist for a refill since they filled it initially  We will see him back in the office as scheduled           Relevant Medications    valsartan (DIOVAN) 80 mg tablet    Other Relevant Orders    CBC and differential    Comprehensive metabolic panel    Lipid panel    TSH, 3rd generation with Free T4 reflex    UA (URINE) with reflex to Scope   Other Visit Diagnoses     Annual physical exam    -  Primary    Relevant Orders    CBC and differential    Comprehensive metabolic panel    Lipid panel    TSH, 3rd generation with Free T4 reflex    UA (URINE) with reflex to Scope    Screening for cardiovascular condition        Relevant Orders    CBC and differential    Comprehensive metabolic panel    Lipid panel    TSH, 3rd generation with Free T4 reflex    UA (URINE) with reflex to Scope    Screening for diabetes mellitus        Relevant Orders    CBC and differential    Comprehensive metabolic panel    Lipid panel    TSH, 3rd generation with Free T4 reflex    UA (URINE) with reflex to Scope    Screening for endocrine, metabolic and immunity disorder        Relevant Orders    CBC and differential    Comprehensive metabolic panel    Lipid panel    TSH, 3rd generation with Free T4 reflex    UA (URINE) with reflex to Scope      The patient had a normal exam today in the office  He will continue with his current medications and will restart the valsartan 80 mg daily as mentioned  He will go for the up-to-date lab work as ordered and we will follow-up with the results  We will see him back in the office as scheduled  Immunizations and preventive care screenings were discussed with patient today  Appropriate education was printed on patient's after visit summary  Counseling:  Dental Health: discussed importance of regular tooth brushing, flossing, and dental visits  Injury prevention: discussed safety/seat belts, safety helmets, smoke detectors, carbon dioxide detectors, and smoking near bedding or upholstery  Exercise: the importance of regular exercise/physical activity was discussed  Recommend exercise 3-5 times per week for at least 30 minutes  BMI Counseling: Body mass index is 32 45 kg/m²  The BMI is above normal  Nutrition recommendations include decreasing portion sizes, encouraging healthy choices of fruits and vegetables, decreasing fast food intake, consuming healthier snacks, limiting drinks that contain sugar, moderation in carbohydrate intake, increasing intake of lean protein, reducing intake of saturated and trans fat and reducing intake of cholesterol  Exercise recommendations include exercising 3-5 times per week and strength training exercises  No pharmacotherapy was ordered  Patient referred to PCP  Rationale for BMI follow-up plan is due to patient being overweight or obese  Depression Screening and Follow-up Plan: Patient was screened for depression during today's encounter  They screened negative with a PHQ-2 score of 0          Return in about 6 months (around 11/8/2023) for Recheck  Chief Complaint:     Chief Complaint   Patient presents with   • Annual Exam     Complete Physical      History of Present Illness:     Adult Annual Physical   Patient here for a comprehensive physical exam  The patient reports no problems  He does exercise and does HIIT workouts- he goes 4 days a weeks  The patient denies any chest pain, shortness of breath, or palpitations  There is no edema  There are no headaches or visual changes  There is no lightheadedness, dizziness, or fainting spells  The patient currently denies any nausea, vomiting, or GERD symptoms  he has normal bowel movements and normal urine output  he has a normal appetite  He sees Dr Pamela Contreras in [facility] for his Aortic valve and the aortic ectasia-he gets an ECHO once a year  Diet and Physical Activity  Diet/Nutrition: well balanced diet, heart healthy (low sodium) diet, low fat diet, low carb diet and consuming 3-5 servings of fruits/vegetables daily  Exercise: moderate cardiovascular exercise and 3-4 times a week on average  Depression Screening  PHQ-2/9 Depression Screening    Little interest or pleasure in doing things: 0 - not at all  Feeling down, depressed, or hopeless: 0 - not at all  PHQ-2 Score: 0  PHQ-2 Interpretation: Negative depression screen       General Health  Sleep: sleeps well  Wears the CPAP  Hearing: normal - bilateral   Vision: no vision problems, goes for regular eye exams, most recent eye exam <1 year ago and wears glasses  Dental: regular dental visits and brushes teeth twice daily   Health  Symptoms include: none     Review of Systems:     Review of Systems   Constitutional: Negative  HENT: Negative  Eyes: Negative  Respiratory: Negative  Cardiovascular: Negative  Gastrointestinal: Negative  Endocrine: Negative  Genitourinary: Negative  Musculoskeletal: Negative  Skin: Negative  Allergic/Immunologic: Negative  Neurological: Negative  Hematological: Negative  Psychiatric/Behavioral: Negative  Past Medical History:     Past Medical History:   Diagnosis Date   • Arthritis     Fingers, lower back ,right hip   • Cancer (HCC)    • Chronic pain    • CPAP (continuous positive airway pressure) dependence    • GERD (gastroesophageal reflux disease)    • Hemangioma of skin    • Hypertension    • Irritable bowel syndrome    • Lumbar spondylolysis    • Multiple pigmented nevi    • Obstructive sleep apnea syndrome    • PONV (postoperative nausea and vomiting)    • Premature atrial beats    • Premature ventricular beats    • Rectus diastasis    • Sleep apnea    • Umbilical hernia       Past Surgical History:     Past Surgical History:   Procedure Laterality Date   • HERNIA REPAIR     • KNEE ARTHROSCOPY Right    • ME RPR UMBILICAL HRNA 5 YRS/> REDUCIBLE N/A 12/12/2017    Procedure: OPEN UMBILICAL HERNIA REPAIR WITH MESH;  Surgeon: Yuli Goode MD;  Location:  MAIN OR;  Service: General   • WISDOM TOOTH EXTRACTION Left       Family History:     Family History   Problem Relation Age of Onset   • Rheum arthritis Mother    • Osteoarthritis Mother    • Thrombocytopenia Mother    • Heart disease Father    • Hypertension Father    • Coronary artery disease Father    • No Known Problems Sister    • No Known Problems Brother       Social History:     Social History     Socioeconomic History   • Marital status: /Civil Union     Spouse name: None   • Number of children: None   • Years of education: None   • Highest education level: None   Occupational History   • None   Tobacco Use   • Smoking status: Never   • Smokeless tobacco: Never   Vaping Use   • Vaping Use: Never used   Substance and Sexual Activity   • Alcohol use:  Yes     Alcohol/week: 4 0 standard drinks     Types: 4 Cans of beer per week   • Drug use: No   • Sexual activity: Yes     Partners: Female     Birth control/protection: None   Other Topics Concern   • None   Social History "Narrative   • None     Social Determinants of Health     Financial Resource Strain: Not on file   Food Insecurity: Not on file   Transportation Needs: Not on file   Physical Activity: Not on file   Stress: Not on file   Social Connections: Not on file   Intimate Partner Violence: Not At Risk   • Fear of Current or Ex-Partner: No   • Emotionally Abused: No   • Physically Abused: No   • Sexually Abused: No   Housing Stability: Not on file      Current Medications:     Current Outpatient Medications   Medication Sig Dispense Refill   • amLODIPine (NORVASC) 5 mg tablet Take 1 tablet (5 mg total) by mouth daily 90 tablet 1   • chlorthalidone 25 mg tablet Take 0 5 tablets (12 5 mg total) by mouth daily 30 tablet 6   • meloxicam (MOBIC) 15 mg tablet Take 1 tablet (15 mg total) by mouth daily 90 tablet 0   • pantoprazole (PROTONIX) 40 mg tablet Take 40 mg by mouth daily     • valsartan (DIOVAN) 80 mg tablet Take 1 tablet (80 mg total) by mouth every 24 hours 90 tablet 1     No current facility-administered medications for this visit  Allergies: Allergies   Allergen Reactions   • Lisinopril Swelling   • Ibuprofen Rash and Hives   • Penicillins Rash     Other reaction(s): Rash      Physical Exam:     /84   Pulse 68   Temp (!) 97 4 °F (36 3 °C) (Tympanic)   Resp 16   Ht 5' 7\" (1 702 m)   Wt 94 kg (207 lb 3 2 oz)   SpO2 98%   BMI 32 45 kg/m²     Physical Exam  Vitals and nursing note reviewed  Constitutional:       Appearance: He is well-developed  HENT:      Head: Normocephalic and atraumatic  Right Ear: External ear normal       Left Ear: External ear normal       Nose: Nose normal       Mouth/Throat:      Pharynx: No oropharyngeal exudate  Eyes:      Conjunctiva/sclera: Conjunctivae normal       Pupils: Pupils are equal, round, and reactive to light  Neck:      Thyroid: No thyromegaly  Trachea: No tracheal deviation     Cardiovascular:      Rate and Rhythm: Normal rate and regular " rhythm  Heart sounds: Normal heart sounds  No murmur heard  No friction rub  No gallop  Pulmonary:      Effort: Pulmonary effort is normal  No respiratory distress  Breath sounds: Normal breath sounds  No stridor  No wheezing or rales  Chest:      Chest wall: No tenderness  Abdominal:      General: Bowel sounds are normal  There is no distension  Palpations: Abdomen is soft  There is no mass  Tenderness: There is no abdominal tenderness  There is no guarding or rebound  Hernia: No hernia is present  Genitourinary:     Penis: No tenderness  Comments: He deferred today since he is going to be seeing his urologist in the near future for a full exam   Musculoskeletal:         General: No tenderness or deformity  Normal range of motion  Cervical back: Normal range of motion and neck supple  Lymphadenopathy:      Cervical: No cervical adenopathy  Skin:     General: Skin is warm and dry  Coloration: Skin is not pale  Findings: No erythema or rash  Neurological:      Mental Status: He is alert and oriented to person, place, and time  Cranial Nerves: No cranial nerve deficit  Sensory: No sensory deficit  Motor: No abnormal muscle tone  Coordination: Coordination normal       Deep Tendon Reflexes: Reflexes normal    Psychiatric:         Behavior: Behavior normal          Thought Content:  Thought content normal          Judgment: Judgment normal           Rutledgeroderick Ge, DO  [address]

## 2023-05-08 NOTE — ASSESSMENT & PLAN NOTE
The patient's blood pressure is mildly elevated today in the office  He states that he stopped taking the valsartan 80 mg daily for no apparent reason  He felt like he was taking too many pills  He will go back on the medication 80 mg daily to better regulate his blood pressure  He is going to contact his cardiologist for a refill since they filled it initially  We will see him back in the office as scheduled

## 2023-05-08 NOTE — ASSESSMENT & PLAN NOTE
The patient is currently stable and will continue to follow-up with his cardiologist as scheduled  We will see him back in the office as scheduled  He will continue to follow-up for his yearly echocardiograms

## 2023-07-28 ENCOUNTER — APPOINTMENT (OUTPATIENT)
Dept: LAB | Facility: CLINIC | Age: 63
End: 2023-07-28
Payer: COMMERCIAL

## 2023-07-28 DIAGNOSIS — Z13.1 SCREENING FOR DIABETES MELLITUS: ICD-10-CM

## 2023-07-28 DIAGNOSIS — Z13.6 SCREENING FOR CARDIOVASCULAR CONDITION: ICD-10-CM

## 2023-07-28 DIAGNOSIS — R10.11 ABDOMINAL PAIN, RIGHT UPPER QUADRANT: ICD-10-CM

## 2023-07-28 DIAGNOSIS — Z13.0 SCREENING FOR ENDOCRINE, METABOLIC AND IMMUNITY DISORDER: ICD-10-CM

## 2023-07-28 DIAGNOSIS — I10 ESSENTIAL HYPERTENSION: ICD-10-CM

## 2023-07-28 DIAGNOSIS — Z00.00 ANNUAL PHYSICAL EXAM: ICD-10-CM

## 2023-07-28 DIAGNOSIS — Z13.228 SCREENING FOR ENDOCRINE, METABOLIC AND IMMUNITY DISORDER: ICD-10-CM

## 2023-07-28 DIAGNOSIS — Z13.29 SCREENING FOR ENDOCRINE, METABOLIC AND IMMUNITY DISORDER: ICD-10-CM

## 2023-07-28 LAB
ALBUMIN SERPL BCP-MCNC: 4 G/DL (ref 3.5–5)
ALP SERPL-CCNC: 53 U/L (ref 46–116)
ALT SERPL W P-5'-P-CCNC: 45 U/L (ref 12–78)
AMYLASE SERPL-CCNC: 62 IU/L (ref 25–115)
ANION GAP SERPL CALCULATED.3IONS-SCNC: 5 MMOL/L
AST SERPL W P-5'-P-CCNC: 31 U/L (ref 5–45)
BASOPHILS # BLD AUTO: 0.04 THOUSANDS/ÂΜL (ref 0–0.1)
BASOPHILS NFR BLD AUTO: 1 % (ref 0–1)
BILIRUB SERPL-MCNC: 1.07 MG/DL (ref 0.2–1)
BILIRUB UR QL STRIP: NEGATIVE
BUN SERPL-MCNC: 19 MG/DL (ref 5–25)
CALCIUM SERPL-MCNC: 9.2 MG/DL (ref 8.3–10.1)
CHLORIDE SERPL-SCNC: 108 MMOL/L (ref 96–108)
CHOLEST SERPL-MCNC: 125 MG/DL
CLARITY UR: CLEAR
CO2 SERPL-SCNC: 27 MMOL/L (ref 21–32)
COLOR UR: NORMAL
CREAT SERPL-MCNC: 1.15 MG/DL (ref 0.6–1.3)
EOSINOPHIL # BLD AUTO: 0.11 THOUSAND/ÂΜL (ref 0–0.61)
EOSINOPHIL NFR BLD AUTO: 2 % (ref 0–6)
ERYTHROCYTE [DISTWIDTH] IN BLOOD BY AUTOMATED COUNT: 13.1 % (ref 11.6–15.1)
GFR SERPL CREATININE-BSD FRML MDRD: 68 ML/MIN/1.73SQ M
GLUCOSE P FAST SERPL-MCNC: 81 MG/DL (ref 65–99)
GLUCOSE UR STRIP-MCNC: NEGATIVE MG/DL
HCT VFR BLD AUTO: 47.1 % (ref 36.5–49.3)
HDLC SERPL-MCNC: 39 MG/DL
HGB BLD-MCNC: 15 G/DL (ref 12–17)
HGB UR QL STRIP.AUTO: NEGATIVE
IMM GRANULOCYTES # BLD AUTO: 0.02 THOUSAND/UL (ref 0–0.2)
IMM GRANULOCYTES NFR BLD AUTO: 0 % (ref 0–2)
KETONES UR STRIP-MCNC: NEGATIVE MG/DL
LDLC SERPL CALC-MCNC: 61 MG/DL (ref 0–100)
LEUKOCYTE ESTERASE UR QL STRIP: NEGATIVE
LIPASE SERPL-CCNC: 251 U/L (ref 73–393)
LYMPHOCYTES # BLD AUTO: 1.76 THOUSANDS/ÂΜL (ref 0.6–4.47)
LYMPHOCYTES NFR BLD AUTO: 29 % (ref 14–44)
MCH RBC QN AUTO: 29.6 PG (ref 26.8–34.3)
MCHC RBC AUTO-ENTMCNC: 31.8 G/DL (ref 31.4–37.4)
MCV RBC AUTO: 93 FL (ref 82–98)
MONOCYTES # BLD AUTO: 0.56 THOUSAND/ÂΜL (ref 0.17–1.22)
MONOCYTES NFR BLD AUTO: 9 % (ref 4–12)
NEUTROPHILS # BLD AUTO: 3.57 THOUSANDS/ÂΜL (ref 1.85–7.62)
NEUTS SEG NFR BLD AUTO: 59 % (ref 43–75)
NITRITE UR QL STRIP: NEGATIVE
NONHDLC SERPL-MCNC: 86 MG/DL
NRBC BLD AUTO-RTO: 0 /100 WBCS
PH UR STRIP.AUTO: 7 [PH]
PLATELET # BLD AUTO: 218 THOUSANDS/UL (ref 149–390)
PMV BLD AUTO: 11.3 FL (ref 8.9–12.7)
POTASSIUM SERPL-SCNC: 4 MMOL/L (ref 3.5–5.3)
PROT SERPL-MCNC: 7.3 G/DL (ref 6.4–8.4)
PROT UR STRIP-MCNC: NEGATIVE MG/DL
RBC # BLD AUTO: 5.06 MILLION/UL (ref 3.88–5.62)
SODIUM SERPL-SCNC: 140 MMOL/L (ref 135–147)
SP GR UR STRIP.AUTO: 1.02 (ref 1–1.03)
TRIGL SERPL-MCNC: 123 MG/DL
TSH SERPL DL<=0.05 MIU/L-ACNC: 2.38 UIU/ML (ref 0.45–4.5)
UROBILINOGEN UR STRIP-ACNC: <2 MG/DL
WBC # BLD AUTO: 6.06 THOUSAND/UL (ref 4.31–10.16)

## 2023-07-28 PROCEDURE — 82150 ASSAY OF AMYLASE: CPT

## 2023-07-28 PROCEDURE — 80053 COMPREHEN METABOLIC PANEL: CPT

## 2023-07-28 PROCEDURE — 83690 ASSAY OF LIPASE: CPT

## 2023-07-28 PROCEDURE — 84443 ASSAY THYROID STIM HORMONE: CPT

## 2023-07-28 PROCEDURE — 85025 COMPLETE CBC W/AUTO DIFF WBC: CPT

## 2023-07-28 PROCEDURE — 36415 COLL VENOUS BLD VENIPUNCTURE: CPT

## 2023-07-28 PROCEDURE — 81003 URINALYSIS AUTO W/O SCOPE: CPT

## 2023-07-28 PROCEDURE — 80061 LIPID PANEL: CPT

## 2023-09-29 ENCOUNTER — APPOINTMENT (OUTPATIENT)
Dept: LAB | Facility: CLINIC | Age: 63
End: 2023-09-29
Payer: COMMERCIAL

## 2023-09-29 DIAGNOSIS — N28.1 ACQUIRED CYST OF KIDNEY: ICD-10-CM

## 2023-09-29 LAB
ANION GAP SERPL CALCULATED.3IONS-SCNC: 1 MMOL/L
BUN SERPL-MCNC: 18 MG/DL (ref 5–25)
CALCIUM SERPL-MCNC: 9.3 MG/DL (ref 8.4–10.2)
CHLORIDE SERPL-SCNC: 102 MMOL/L (ref 96–108)
CO2 SERPL-SCNC: 38 MMOL/L (ref 21–32)
CREAT SERPL-MCNC: 1.27 MG/DL (ref 0.6–1.3)
GFR SERPL CREATININE-BSD FRML MDRD: 60 ML/MIN/1.73SQ M
GLUCOSE P FAST SERPL-MCNC: 88 MG/DL (ref 65–99)
POTASSIUM SERPL-SCNC: 4.1 MMOL/L (ref 3.5–5.3)
SODIUM SERPL-SCNC: 141 MMOL/L (ref 135–147)

## 2023-09-29 PROCEDURE — 80048 BASIC METABOLIC PNL TOTAL CA: CPT

## 2023-09-29 PROCEDURE — 36415 COLL VENOUS BLD VENIPUNCTURE: CPT

## 2023-10-19 ENCOUNTER — OFFICE VISIT (OUTPATIENT)
Dept: FAMILY MEDICINE CLINIC | Facility: CLINIC | Age: 63
End: 2023-10-19
Payer: COMMERCIAL

## 2023-10-19 VITALS
BODY MASS INDEX: 32.18 KG/M2 | RESPIRATION RATE: 16 BRPM | SYSTOLIC BLOOD PRESSURE: 132 MMHG | DIASTOLIC BLOOD PRESSURE: 84 MMHG | TEMPERATURE: 97.5 F | HEART RATE: 74 BPM | HEIGHT: 67 IN | OXYGEN SATURATION: 96 % | WEIGHT: 205 LBS

## 2023-10-19 DIAGNOSIS — M25.50 POLYARTHRALGIA: ICD-10-CM

## 2023-10-19 DIAGNOSIS — M17.11 PRIMARY LOCALIZED OSTEOARTHRITIS OF RIGHT KNEE: ICD-10-CM

## 2023-10-19 DIAGNOSIS — K76.0 FATTY LIVER: ICD-10-CM

## 2023-10-19 DIAGNOSIS — K21.9 GASTROESOPHAGEAL REFLUX DISEASE WITHOUT ESOPHAGITIS: Primary | ICD-10-CM

## 2023-10-19 DIAGNOSIS — I10 ESSENTIAL HYPERTENSION: ICD-10-CM

## 2023-10-19 DIAGNOSIS — N28.1 COMPLEX RENAL CYST: ICD-10-CM

## 2023-10-19 PROCEDURE — 99214 OFFICE O/P EST MOD 30 MIN: CPT | Performed by: FAMILY MEDICINE

## 2023-10-19 RX ORDER — FAMOTIDINE 20 MG/1
20 TABLET, FILM COATED ORAL
COMMUNITY

## 2023-10-19 NOTE — PROGRESS NOTES
Assessment/Plan:  Problem List Items Addressed This Visit        Digestive    Esophageal reflux - Primary     Patient will follow-up with his GI specialist as scheduled. Relevant Medications    famotidine (PEPCID) 20 mg tablet    Fatty liver     The patient had evidence of fatty liver disease seen on his recent ultrasound. His calculated fib 4 index is low at 1.29 demonstrating absence of fibrosis. He is going to work on diet and exercise to improve the fatty liver. He will follow-up with GI as scheduled. Cardiovascular and Mediastinum    Essential hypertension     The patient's blood pressure stable on his current medication. We have made no changes today            Musculoskeletal and Integument    Primary localized osteoarthritis of right knee     The patient will try the Voltaren gel as indicated along with alternating between ice and heat to his knee. He will follow-up with orthopedics as scheduled. Relevant Medications    Diclofenac Sodium (VOLTAREN) 1 %       Genitourinary    Right Complex renal cyst     The patient will go for the imaging as indicated to follow-up on the complex cyst.  He will follow-up with urology. Other Visit Diagnoses     Polyarthralgia        Relevant Orders    Lyme Total AB W Reflex to IGM/IGG    Sedimentation rate, automated    BHAVESH Screen w/ Reflex to Titer/Pattern    RF Screen w/ Reflex to Titer    Cyclic citrul peptide antibody, IgG    C-reactive protein          Return in about 4 months (around 2/19/2024). I spent 15 minutes during the visit reviewing the history from the patient, performing the examination, discussing the findings with the patient, providing counseling and education, and making a plan. I spent 15 minutes ordering referrals and testing and documenting.     Subjective:   Chief Complaint   Patient presents with   • Discuss U/S results   • Cyst     Still having a lot of inflamamtion        Patient ID: Lisa Campos is a 66 y.o. male presents today for presents today for routine checkup. Hussain Dorantes is a 66 y.o. male who presents today for routine checkup following his recent testing. He did have recent testing including an ultrasound that demonstrated a complex calcified right renal cyst.  He was referred to urology by his GI specialist.    He is having a CT scan tomorrow with Dr. Vandana Zapien. He was having inflammation in his stomach since June with eating fruit- he saw Gi and he ordered an US and blood work to check for gallbladder disease. It demonstrated fatty liver. There was inflammation of the stomach- negative for H- pylori He was advised to stay on omeparazole and pepcid at night. There was an incidental finding of a calcification on the kidney. Jie Guillaume tmc4    There is ongloing asrthritis inflamationinhis right knee and both hands. He is seeing Dr. Marvin Bojorquez in a month  He is seeing GI- Dr. Marvin Bojorquez - he had to do an EGD.         The following portions of the patient's history were reviewed and updated as appropriate: allergies, current medications, past family history, past medical history, past social history, past surgical history and problem list.  Patient Active Problem List   Diagnosis   • Essential hypertension   • ZIA (obstructive sleep apnea)   • Insufficient sleep syndrome   • Hypersomnia   • Palpitations   • Allergic rhinitis   • Chronic bilateral low back pain with bilateral sciatica   • Chronic myofascial pain   • Esophageal reflux   • Hemangioma of skin   • Displacement of lumbar intervertebral disc without myelopathy   • Lumbar spondylolysis   • Multiple pigmented nevi   • Primary localized osteoarthritis of right hip   • Primary localized osteoarthritis of right knee   • Rectus diastasis   • Chronic pansinusitis   • Arthritis of right hip   • Lumbar radiculopathy   • Squamous cell skin cancer- scalp   • Primary osteoarthritis of both hands   • Thrombophlebitis of superficial veins of left lower extremity   • Abrasion of anterior left lower leg   • Hematoma of left lower leg   • Bicuspid aortic valve with ascending aorta 4.0 to 4.5 cm in diameter   • Aortopathy associated with bicuspid aortic valve   • Adult congenital heart disease   • Pain of left calf   • Chronic pain of left knee   • Recurrent idiopathic thrombophlebitis   • Blood clotting disorder (HCC)   • Ectatic aorta (HCC)   • Right Complex renal cyst   • Multiple congenital cysts of kidney   • Benign prostatic hyperplasia with urinary obstruction   • Fatty liver     Past Medical History:   Diagnosis Date   • Arthritis     Fingers, lower back ,right hip   • Cancer (HCC)    • Chronic pain    • CPAP (continuous positive airway pressure) dependence    • GERD (gastroesophageal reflux disease)    • Hemangioma of skin    • Hypertension    • Irritable bowel syndrome    • Lumbar spondylolysis    • Multiple pigmented nevi    • Obstructive sleep apnea syndrome    • PONV (postoperative nausea and vomiting)    • Premature atrial beats    • Premature ventricular beats    • Rectus diastasis    • Sleep apnea    • Umbilical hernia      Past Surgical History:   Procedure Laterality Date   • HERNIA REPAIR     • KNEE ARTHROSCOPY Right    • [place] RPR UMBILICAL HRNA 5 YRS/> REDUCIBLE N/A 12/12/2017    Procedure: OPEN UMBILICAL HERNIA REPAIR WITH MESH;  Surgeon: Marina Guillen MD;  Location:  MAIN OR;  Service: General   • WISDOM TOOTH EXTRACTION Left      Allergies   Allergen Reactions   • Lisinopril Swelling   • Ibuprofen Rash and Hives   • Molds & Smuts Itching   • Penicillins Rash     Other reaction(s): Rash     Family History   Problem Relation Age of Onset   • Rheum arthritis Mother    • Osteoarthritis Mother    • Thrombocytopenia Mother    • Diabetes Mother 80   • Heart disease Father    • Hypertension Father    • Coronary artery disease Father    • No Known Problems Sister    • No Known Problems Brother      Social History     Socioeconomic History   • Marital status: /Civil [employer]     Spouse name: Not on file   • Number of children: Not on file   • Years of education: Not on file   • Highest education level: Not on file   Occupational History   • Not on file   Tobacco Use   • Smoking status: Never   • Smokeless tobacco: Never   Vaping Use   • Vaping Use: Never used   Substance and Sexual Activity   • Alcohol use: Yes     Alcohol/week: 4.0 standard drinks of alcohol     Types: 4 Cans of beer per week     Comment: Soc   • Drug use: No   • Sexual activity: Yes     Partners: Female     Birth control/protection: None   Other Topics Concern   • Not on file   Social History Narrative   • Not on file     Social Determinants of Health     Financial Resource Strain: Low Risk  (5/27/2021)    Overall Financial Resource Strain (CARDIA)    • Difficulty of Paying Living Expenses: Not hard at all   Food Insecurity: No Food Insecurity (5/27/2021)    Hunger Vital Sign    • Worried About Running Out of Food in the Last Year: Never true    • Ran Out of Food in the Last Year: Never true   Transportation Needs: No Transportation Needs (5/27/2021)    PRAPARE - Transportation    • Lack of Transportation (Medical): No    • Lack of Transportation (Non-Medical): No   Physical Activity: Sufficiently Active (7/13/2021)    Exercise Vital Sign    • Days of Exercise per Week: 3 days    • Minutes of Exercise per Session: 60 min   Recent Concern: Physical Activity - Inactive (7/1/2021)    Exercise Vital Sign    • Days of Exercise per Week: 0 days    • Minutes of Exercise per Session: 0 min   Stress: Stress Concern Present (7/13/2021)    [facility]    • Feeling of Stress :  To some extent   Social Connections: Socially Integrated (5/27/2021)    Social Connection and Isolation Panel [NHANES]    • Frequency of Communication with Friends and Family: More than three times a week    • Frequency of Social Gatherings with Friends and Family: Once a week    • Attends Muslim Services: More than 4 times per year    • Active Member of Clubs or Organizations: Yes    • Attends Club or Organization Meetings: More than 4 times per year    • Marital Status:    Intimate Partner Violence: Not At Risk (5/8/2023)    Humiliation, Afraid, Rape, and Kick questionnaire    • Fear of Current or Ex-Partner: No    • Emotionally Abused: No    • Physically Abused: No    • Sexually Abused: No   Housing Stability: Not on file     Current Outpatient Medications on File Prior to Visit   Medication Sig Dispense Refill   • amLODIPine (NORVASC) 5 mg tablet Take 1 tablet (5 mg total) by mouth daily 90 tablet 1   • famotidine (PEPCID) 20 mg tablet Take 20 mg by mouth daily at bedtime     • pantoprazole (PROTONIX) 40 mg tablet Take 40 mg by mouth daily     • valsartan (DIOVAN) 80 mg tablet Take 1 tablet (80 mg total) by mouth every 24 hours 90 tablet 1   • chlorthalidone 25 mg tablet Take 0.5 tablets (12.5 mg total) by mouth daily 30 tablet 6     No current facility-administered medications on file prior to visit. Review of Systems   Constitutional: Negative. HENT: Negative. Eyes: Negative. Respiratory: Negative. Cardiovascular: Negative. Gastrointestinal: Negative. Endocrine: Negative. Genitourinary: Negative. Musculoskeletal: Negative. Skin: Negative. Allergic/Immunologic: Negative. Neurological: Negative. Hematological: Negative. Psychiatric/Behavioral: Negative. Objective:  Vitals:    10/19/23 1000   BP: 132/84   Pulse: 74   Resp: 16   Temp: 97.5 °F (36.4 °C)   SpO2: 96%   Weight: 93 kg (205 lb)   Height: 5' 7" (1.702 m)     Body mass index is 32.11 kg/m². Physical Exam  Vitals and nursing note reviewed. Constitutional:       General: He is not in acute distress. Appearance: Normal appearance. He is well-developed. He is not diaphoretic. HENT:      Head: Normocephalic and atraumatic.       Right Ear: Tympanic membrane, ear canal and external ear normal.      Left Ear: Tympanic membrane, ear canal and external ear normal.      Nose: No congestion or rhinorrhea. Mouth/Throat:      Mouth: Mucous membranes are moist.      Pharynx: Oropharynx is clear. Eyes:      Extraocular Movements: Extraocular movements intact. Pupils: Pupils are equal, round, and reactive to light. Neck:      Thyroid: No thyromegaly. Vascular: No JVD. Trachea: No tracheal deviation. Cardiovascular:      Rate and Rhythm: Normal rate and regular rhythm. Heart sounds: Normal heart sounds. No murmur heard. No friction rub. No gallop. Pulmonary:      Effort: Pulmonary effort is normal. No respiratory distress. Breath sounds: Normal breath sounds. No stridor. No wheezing or rales. Chest:      Chest wall: No tenderness. Abdominal:      General: Bowel sounds are normal. There is no distension. Palpations: Abdomen is soft. There is no mass. Tenderness: There is no abdominal tenderness. There is no guarding or rebound. Musculoskeletal:         General: Normal range of motion. Cervical back: Normal range of motion and neck supple. Lymphadenopathy:      Cervical: No cervical adenopathy. Skin:     General: Skin is warm and dry. Coloration: Skin is not pale. Findings: No erythema or rash. Neurological:      Mental Status: He is alert and oriented to person, place, and time. Cranial Nerves: No cranial nerve deficit. Motor: No abnormal muscle tone. Coordination: Coordination normal.      Deep Tendon Reflexes: Reflexes are normal and symmetric. Reflexes normal.             Depression Screening and Follow-up Plan: Patient was screened for depression during today's encounter. They screened negative with a PHQ-2 score of 0.

## 2023-10-23 ENCOUNTER — APPOINTMENT (OUTPATIENT)
Dept: LAB | Facility: CLINIC | Age: 63
End: 2023-10-23
Payer: COMMERCIAL

## 2023-10-23 DIAGNOSIS — M25.50 POLYARTHRALGIA: ICD-10-CM

## 2023-10-23 LAB
ANA SER QL IA: NEGATIVE
B BURGDOR IGG+IGM SER QL IA: NEGATIVE
CRP SERPL QL: 3.1 MG/L
ERYTHROCYTE [SEDIMENTATION RATE] IN BLOOD: 6 MM/HOUR (ref 0–19)

## 2023-10-23 PROCEDURE — 86430 RHEUMATOID FACTOR TEST QUAL: CPT

## 2023-10-23 PROCEDURE — 36415 COLL VENOUS BLD VENIPUNCTURE: CPT

## 2023-10-23 PROCEDURE — 86200 CCP ANTIBODY: CPT

## 2023-10-23 PROCEDURE — 86618 LYME DISEASE ANTIBODY: CPT

## 2023-10-23 PROCEDURE — 86140 C-REACTIVE PROTEIN: CPT

## 2023-10-23 PROCEDURE — 86038 ANTINUCLEAR ANTIBODIES: CPT

## 2023-10-23 PROCEDURE — 85652 RBC SED RATE AUTOMATED: CPT

## 2023-10-23 NOTE — ASSESSMENT & PLAN NOTE
The patient had evidence of fatty liver disease seen on his recent ultrasound. His calculated fib 4 index is low at 1.29 demonstrating absence of fibrosis. He is going to work on diet and exercise to improve the fatty liver. He will follow-up with GI as scheduled.

## 2023-10-23 NOTE — ASSESSMENT & PLAN NOTE
The patient will try the Voltaren gel as indicated along with alternating between ice and heat to his knee. He will follow-up with orthopedics as scheduled. Yes

## 2023-10-23 NOTE — ASSESSMENT & PLAN NOTE
The patient will go for the imaging as indicated to follow-up on the complex cyst.  He will follow-up with urology.

## 2023-10-24 LAB
CCP AB SER IA-ACNC: 0.9
RHEUMATOID FACT SER QL LA: NEGATIVE

## 2023-10-25 ENCOUNTER — PATIENT MESSAGE (OUTPATIENT)
Dept: FAMILY MEDICINE CLINIC | Facility: CLINIC | Age: 63
End: 2023-10-25

## 2023-10-25 DIAGNOSIS — Z12.5 SCREENING FOR PROSTATE CANCER: Primary | ICD-10-CM

## 2023-11-13 ENCOUNTER — RA CDI HCC (OUTPATIENT)
Dept: OTHER | Facility: HOSPITAL | Age: 63
End: 2023-11-13

## 2023-11-15 ENCOUNTER — HOSPITAL ENCOUNTER (OUTPATIENT)
Dept: NON INVASIVE DIAGNOSTICS | Age: 63
Discharge: HOME | End: 2023-11-15
Payer: COMMERCIAL

## 2023-11-15 ENCOUNTER — OFFICE VISIT (OUTPATIENT)
Dept: FAMILY MEDICINE CLINIC | Facility: CLINIC | Age: 63
End: 2023-11-15
Payer: COMMERCIAL

## 2023-11-15 ENCOUNTER — TELEPHONE (OUTPATIENT)
Dept: FAMILY MEDICINE CLINIC | Facility: CLINIC | Age: 63
End: 2023-11-15

## 2023-11-15 VITALS
DIASTOLIC BLOOD PRESSURE: 80 MMHG | SYSTOLIC BLOOD PRESSURE: 122 MMHG | RESPIRATION RATE: 16 BRPM | HEIGHT: 67 IN | TEMPERATURE: 98 F | OXYGEN SATURATION: 96 % | BODY MASS INDEX: 32.02 KG/M2 | HEART RATE: 74 BPM | WEIGHT: 204 LBS

## 2023-11-15 DIAGNOSIS — Z86.718 HISTORY OF DVT OF LOWER EXTREMITY: ICD-10-CM

## 2023-11-15 DIAGNOSIS — R60.0 LOWER EXTREMITY EDEMA: ICD-10-CM

## 2023-11-15 DIAGNOSIS — M79.661 RIGHT CALF PAIN: Primary | ICD-10-CM

## 2023-11-15 DIAGNOSIS — M79.661 RIGHT CALF PAIN: ICD-10-CM

## 2023-11-15 PROCEDURE — 99213 OFFICE O/P EST LOW 20 MIN: CPT | Performed by: PHYSICIAN ASSISTANT

## 2023-11-15 PROCEDURE — 93971 EXTREMITY STUDY: CPT

## 2023-11-15 PROCEDURE — 93971 EXTREMITY STUDY: CPT | Performed by: SURGERY

## 2023-11-15 NOTE — PROGRESS NOTES
Name: Jarad Caruso      : 1960      MRN: [id number]  Encounter Provider: Josue Connolly PA-C  Encounter Date: 11/15/2023   Encounter department: [facility]    Assessment & Plan     1. Right calf pain  -     VAS lower limb venous duplex study, unilateral/limited; Future; Expected date: 11/15/2023    2. Lower extremity edema  -     VAS lower limb venous duplex study, unilateral/limited; Future; Expected date: 11/15/2023    3. History of DVT of lower extremity  -     VAS lower limb venous duplex study, unilateral/limited; Future; Expected date: 11/15/2023    Venous duplex study pending for today. We will call patient with results. Depression Screening and Follow-up Plan: Patient was screened for depression during today's encounter. They screened negative with a PHQ-2 score of 0. Subjective      HPI  62-year-old male here today with concerns for right lower extremity edema as well as calf pain. Patient states that had prior history of left DVT and was evaluated by hematology with negative work-up. Patient denies any obvious trauma or long car or plane rides. Patient states was playing basketball with his daughter did note some knee swelling after recent twisting injury. Patient has known history of meniscus surgery of the right knee. Pain 6 out of 10 right calf. Patient agreeable with ultrasound to rule out DVT. Review of Systems   Constitutional: Negative. Respiratory: Negative. Cardiovascular: Negative. Gastrointestinal: Negative. All other systems reviewed and are negative.       Current Outpatient Medications on File Prior to Visit   Medication Sig    amLODIPine (NORVASC) 5 mg tablet Take 1 tablet (5 mg total) by mouth daily    chlorthalidone 25 mg tablet Take 0.5 tablets (12.5 mg total) by mouth daily    Diclofenac Sodium (VOLTAREN) 1 % Apply 4 g topically 4 (four) times a day    famotidine (PEPCID) 20 mg tablet Take 20 mg by mouth daily at bedtime    pantoprazole (PROTONIX) 40 mg tablet Take 40 mg by mouth daily    valsartan (DIOVAN) 80 mg tablet Take 1 tablet (80 mg total) by mouth every 24 hours       Objective     /80 (BP Location: Right arm, Patient Position: Sitting, Cuff Size: Standard)   Pulse 74   Temp 98 °F (36.7 °C) (Tympanic)   Resp 16   Ht 5' 7" (1.702 m)   Wt 92.5 kg (204 lb)   SpO2 96%   BMI 31.95 kg/m²     Physical Exam  Vitals and nursing note reviewed. Constitutional:       General: He is not in acute distress. Appearance: He is not ill-appearing, toxic-appearing or diaphoretic. HENT:      Head: Normocephalic and atraumatic. Cardiovascular:      Rate and Rhythm: Normal rate and regular rhythm. Heart sounds: Normal heart sounds. No murmur heard. Pulmonary:      Effort: Pulmonary effort is normal.      Breath sounds: Normal breath sounds. Abdominal:      General: Bowel sounds are normal.      Palpations: Abdomen is soft. Tenderness: There is no abdominal tenderness. Musculoskeletal:         General: Tenderness present. Right lower leg: Edema present. Left lower leg: No edema. Comments: Positive right calf pain with palpation. Positive right lower extremity edema more notable ankle region. Neurological:      General: No focal deficit present. Mental Status: He is alert and oriented to person, place, and time.        Navi Koehler PA-C

## 2023-11-15 NOTE — TELEPHONE ENCOUNTER
Beautiful,  Can you verify that patient knows the results are neg as well. If having persistent leg pain would recommend he see his orthopedic doctor which he is already following.

## 2023-12-13 ENCOUNTER — APPOINTMENT (OUTPATIENT)
Dept: LAB | Facility: CLINIC | Age: 63
End: 2023-12-13
Payer: COMMERCIAL

## 2023-12-13 DIAGNOSIS — Z12.5 SCREENING FOR PROSTATE CANCER: ICD-10-CM

## 2023-12-13 LAB — PSA SERPL-MCNC: 2.45 NG/ML (ref 0–4)

## 2023-12-13 PROCEDURE — 36415 COLL VENOUS BLD VENIPUNCTURE: CPT

## 2023-12-13 PROCEDURE — G0103 PSA SCREENING: HCPCS

## 2024-02-02 ENCOUNTER — RA CDI HCC (OUTPATIENT)
Dept: OTHER | Facility: HOSPITAL | Age: 64
End: 2024-02-02

## 2024-02-15 ENCOUNTER — TELEMEDICINE (OUTPATIENT)
Age: 64
End: 2024-02-15
Payer: COMMERCIAL

## 2024-02-15 DIAGNOSIS — R68.89 FLU-LIKE SYMPTOMS: Primary | ICD-10-CM

## 2024-02-15 DIAGNOSIS — J01.00 ACUTE MAXILLARY SINUSITIS, RECURRENCE NOT SPECIFIED: ICD-10-CM

## 2024-02-15 PROCEDURE — 99213 OFFICE O/P EST LOW 20 MIN: CPT | Performed by: NURSE PRACTITIONER

## 2024-02-15 RX ORDER — OSELTAMIVIR PHOSPHATE 75 MG/1
75 CAPSULE ORAL EVERY 12 HOURS SCHEDULED
Qty: 10 CAPSULE | Refills: 0 | Status: SHIPPED | OUTPATIENT
Start: 2024-02-15 | End: 2024-02-20

## 2024-02-15 RX ORDER — FLUTICASONE PROPIONATE 50 MCG
1 SPRAY, SUSPENSION (ML) NASAL 2 TIMES DAILY PRN
Qty: 9.9 ML | Refills: 1 | Status: SHIPPED | OUTPATIENT
Start: 2024-02-15

## 2024-02-15 NOTE — PROGRESS NOTES
Virtual Regular Visit    Verification of patient location:    Patient is located at Home in the following state in which I hold an active license [place]      Assessment/Plan:    Presentation most consistent with seasonal influenza with sinusitis of unknown etiology.    Start Tamiflu for suspected symptoms.  Side effects discussed.    Start fluticasone nasal spray for sinus symptoms    Conservative measures: rest, hydration, OTC analgesics    Discussed what signs and symptoms warrant emergent medical care. Patient verbalized understanding.     Documentation for this encounter was completed with the assistance of dictation software.  Please excuse any grammatical errors.  Please contact the provider if any documentation clarification is necessary.    Problem List Items Addressed This Visit    None  Visit Diagnoses     Flu-like symptoms    -  Primary    Relevant Medications    oseltamivir (TAMIFLU) 75 mg capsule    Acute maxillary sinusitis, recurrence not specified        Relevant Medications    fluticasone (FLONASE) 50 mcg/act nasal spray                 Reason for visit is No chief complaint on file.       Encounter provider SOM May    Provider located at [facility]  [address]  [facility] [address] [phone]      Recent Visits  No visits were found meeting these conditions.  Showing recent visits within past 7 days and meeting all other requirements  Today's Visits  Date Type Provider Dept   02/15/24 Telemedicine SOM May [facility]   Showing today's visits and meeting all other requirements  Future Appointments  No visits were found meeting these conditions.  Showing future appointments within next 150 days and meeting all other requirements       The patient was identified by name and date of birth. Cain Ni was informed that this is a telemedicine visit and that the visit is being conducted  "through the Epic Embedded platform. He agrees to proceed..  My office door was closed. No one else was in the room.  He acknowledged consent and understanding of privacy and security of the video platform. The patient has agreed to participate and understands they can discontinue the visit at any time.    Patient is aware this is a billable service.     Subjective  Cain Ni is a 63 y.o. male with URI s/s .      Primary complaint: L maxillary sinus pain and pressure x 2 weeks, dentist said not related to teeth this morning at dentist appt; this morning \"feels like I got hit by a truck\" w/ onset of new symptoms   Corresponding symptoms: this morning --> chills, fatigue, nasal congestion, body aches  Course (improving, worsening, stable): sinus pain \"a little worse\"  Fever (TMAX): patient unsure  Respiratory Symptoms?: cough  Recent sick contacts: \" I know a lot of sick people... son recently sick\"  Recent COVID dx/test: denies  Hx of similar illnesses?: \"doesn't feel like Covid... feels like the flu\"  Recent antibiotics: denies  Treatments?: meloxicam helping with joint pain    Had a stye in left eye \" a few weeks ago\"         Past Medical History:   Diagnosis Date   • Arthritis     Fingers, lower back ,right hip   • Cancer (HCC)    • Chronic pain    • CPAP (continuous positive airway pressure) dependence    • GERD (gastroesophageal reflux disease)    • Hemangioma of skin    • Hypertension    • Irritable bowel syndrome    • Lumbar spondylolysis    • Multiple pigmented nevi    • Obstructive sleep apnea syndrome    • PONV (postoperative nausea and vomiting)    • Premature atrial beats    • Premature ventricular beats    • Rectus diastasis    • Sleep apnea    • Umbilical hernia        Past Surgical History:   Procedure Laterality Date   • HERNIA REPAIR     • KNEE ARTHROSCOPY Right    • AR RPR UMBILICAL HRNA 5 YRS/> REDUCIBLE N/A 12/12/2017    Procedure: OPEN UMBILICAL HERNIA REPAIR WITH MESH;  Surgeon: Marvin" MD Izzy;  Location:  MAIN OR;  Service: General   • WISDOM TOOTH EXTRACTION Left        Current Outpatient Medications   Medication Sig Dispense Refill   • fluticasone (FLONASE) 50 mcg/act nasal spray 1 spray into each nostril 2 (two) times a day as needed for rhinitis (sinus pressure) 9.9 mL 1   • oseltamivir (TAMIFLU) 75 mg capsule Take 1 capsule (75 mg total) by mouth every 12 (twelve) hours for 5 days 10 capsule 0   • amLODIPine (NORVASC) 5 mg tablet Take 1 tablet (5 mg total) by mouth daily 90 tablet 1   • chlorthalidone 25 mg tablet Take 0.5 tablets (12.5 mg total) by mouth daily 30 tablet 6   • Diclofenac Sodium (VOLTAREN) 1 % Apply 4 g topically 4 (four) times a day 100 g 0   • famotidine (PEPCID) 20 mg tablet Take 20 mg by mouth daily at bedtime     • pantoprazole (PROTONIX) 40 mg tablet Take 40 mg by mouth daily     • valsartan (DIOVAN) 80 mg tablet Take 1 tablet (80 mg total) by mouth every 24 hours 90 tablet 1     No current facility-administered medications for this visit.        Allergies   Allergen Reactions   • Lisinopril Swelling   • Ibuprofen Rash and Hives   • Molds & Smuts Itching   • Penicillins Rash     Other reaction(s): Rash       Review of Systems   Constitutional:  Positive for chills and fatigue.   HENT:  Positive for congestion, sinus pressure and sinus pain.    Eyes: Negative.    Respiratory:  Positive for cough. Negative for shortness of breath and wheezing.    Cardiovascular: Negative.    Gastrointestinal: Negative.    Endocrine: Negative.    Genitourinary: Negative.    Musculoskeletal:  Positive for myalgias.   Skin: Negative.    Allergic/Immunologic: Negative.    Neurological: Negative.    Hematological: Negative.    Psychiatric/Behavioral: Negative.         Video Exam    There were no vitals filed for this visit.    Physical Exam  Constitutional:       General: He is not in acute distress.     Appearance: Normal appearance.   Eyes:      Extraocular Movements: Extraocular  movements intact.      Conjunctiva/sclera: Conjunctivae normal.   Pulmonary:      Effort: Pulmonary effort is normal. No respiratory distress.      Breath sounds: No wheezing.   Abdominal:      General: Abdomen is flat.   Musculoskeletal:         General: Normal range of motion.      Cervical back: Normal range of motion.   Skin:     Findings: No erythema or rash.   Neurological:      Mental Status: He is alert and oriented to person, place, and time.   Psychiatric:         Mood and Affect: Mood normal.         Behavior: Behavior normal.          Visit Time  Total Visit Duration: 30

## 2024-02-20 ENCOUNTER — OFFICE VISIT (OUTPATIENT)
Dept: SLEEP CENTER | Facility: HOSPITAL | Age: 64
End: 2024-02-20
Payer: COMMERCIAL

## 2024-02-20 VITALS
DIASTOLIC BLOOD PRESSURE: 76 MMHG | HEART RATE: 72 BPM | WEIGHT: 209 LBS | SYSTOLIC BLOOD PRESSURE: 118 MMHG | HEIGHT: 68 IN | BODY MASS INDEX: 31.67 KG/M2 | OXYGEN SATURATION: 95 %

## 2024-02-20 DIAGNOSIS — I10 ESSENTIAL HYPERTENSION: ICD-10-CM

## 2024-02-20 DIAGNOSIS — G47.33 OBSTRUCTIVE SLEEP APNEA SYNDROME: Primary | ICD-10-CM

## 2024-02-20 DIAGNOSIS — G89.29 CHRONIC BILATERAL LOW BACK PAIN WITH BILATERAL SCIATICA: ICD-10-CM

## 2024-02-20 DIAGNOSIS — E66.9 OBESITY (BMI 30-39.9): ICD-10-CM

## 2024-02-20 DIAGNOSIS — J30.9 ALLERGIC RHINITIS, UNSPECIFIED SEASONALITY, UNSPECIFIED TRIGGER: ICD-10-CM

## 2024-02-20 DIAGNOSIS — M54.41 CHRONIC BILATERAL LOW BACK PAIN WITH BILATERAL SCIATICA: ICD-10-CM

## 2024-02-20 DIAGNOSIS — M79.18 CHRONIC MYOFASCIAL PAIN: ICD-10-CM

## 2024-02-20 DIAGNOSIS — F51.12 INSUFFICIENT SLEEP SYNDROME: ICD-10-CM

## 2024-02-20 DIAGNOSIS — G89.29 CHRONIC MYOFASCIAL PAIN: ICD-10-CM

## 2024-02-20 DIAGNOSIS — K21.9 GASTROESOPHAGEAL REFLUX DISEASE, UNSPECIFIED WHETHER ESOPHAGITIS PRESENT: ICD-10-CM

## 2024-02-20 DIAGNOSIS — M54.42 CHRONIC BILATERAL LOW BACK PAIN WITH BILATERAL SCIATICA: ICD-10-CM

## 2024-02-20 PROCEDURE — 99214 OFFICE O/P EST MOD 30 MIN: CPT | Performed by: INTERNAL MEDICINE

## 2024-02-20 NOTE — PROGRESS NOTES
Follow-Up Note - [facility]   Cain Ni  63 y.o. male  :1960  MRN:[id number]  DOS:2024    CC: I saw this patient for follow-up in clinic today for Sleep disordered breathing, Coexisting Sleep and Medical Problems.  Interval changes: Patient received ot a Dream Station Version 2 machine from [employer] over a year ago.    PFSH, Problem List, Medications & Allergies were reviewed in EMR.   He  has a past medical history of Arthritis, Cancer (HCC), Chronic pain, CPAP (continuous positive airway pressure) dependence, GERD (gastroesophageal reflux disease), Hemangioma of skin, Hypertension, Irritable bowel syndrome, Lumbar spondylolysis, Multiple pigmented nevi, Obstructive sleep apnea syndrome, PONV (postoperative nausea and vomiting), Premature atrial beats, Premature ventricular beats, Rectus diastasis, Sleep apnea, and Umbilical hernia.    He has a current medication list which includes the following prescription(s): amlodipine, chlorthalidone, diclofenac sodium, famotidine, fluticasone, oseltamivir, pantoprazole, and valsartan.    PHYSIOLOGICAL DATA REVIEW : Using PAP > 4 hours/night 100%. Estimated CM 1.9/hour with pressure of 16cm H2O ; patient has not been using non FDA approved devices to sanitize the machine.  INTERPRETATION: Compliance is excellent; Pressure setting is:optimal; ;   SUBJECTIVE: With respect to use of PAP, Cain  is experiencing minimal adverse effects:dry mouth/throat because mouth opens at times.He derives benefit.  Is satisfied with sleep and daytime function.   Sleep Routine: Cain reports getting 6.5 hrs sleep; he has no difficulty initiating or maintaining sleep . He arises spontaneously and feels refreshed.Cain reports excessive daytime sleepiness, feels like napping but does not have the opportunity.  He rated himself at Total score: 12 /24 on the [place] Sleepiness Scale.   Other issues: Musculoskeletal aches and pains that can limit sleep..     Habits:  "Reports that he has never smoked. He has never been exposed to tobacco smoke. He has never used smokeless tobacco.,  Reports current alcohol use of about 4.0 standard drinks of alcohol per week.,  Reports no history of drug use., Caffeine use:limited; Exercise routine: regular.      ROS: Significant for weight fluctuates in the range of a few pounds.  Allergies and acid reflux are controlled.  A 10 point review of systems was otherwise negative..    EXAM: /76 (BP Location: Left arm, Patient Position: Sitting, Cuff Size: Large)   Pulse 72   Ht 5' 8\" (1.727 m)   Wt 94.8 kg (209 lb)   SpO2 95%   BMI 31.78 kg/m²     Wt Readings from Last 3 Encounters:   02/20/24 94.8 kg (209 lb)   11/15/23 92.5 kg (204 lb)   10/19/23 93 kg (205 lb)      Patient is well groomed; well appearing.   CNS: Alert, orientated, speech clear & coherent  Psych: cooperative and in no distress. Mental state: Appears normal.  H&N: EOMI; NC/AT: No facial pressure marks, no rashes.    Skin/Extrem: col & hydration normal; no edema  Resp: Respiratory effort is normal  Physical findings otherwise essentially unchanged from previous.    IMPRESSION: Problem List Items & Comorbidities Addressed this Visit    1. Obstructive sleep apnea syndrome  PAP DME Resupply/Reorder      2. Insufficient sleep syndrome        3. Allergic rhinitis, unspecified seasonality, unspecified trigger        4. Essential hypertension        5. Chronic bilateral low back pain with bilateral sciatica        6. Chronic myofascial pain        7. Gastroesophageal reflux disease, unspecified whether esophagitis present        8. Obesity (BMI 30-39.9)            PLAN:  I reviewed results of prior studies and physiologic data with the patient.   I discussed treatment options with risks and benefits.  Treatment with  PAP is medically necessary and Cain is agreable to continue use.   Care of equipment, methods to improve comfort using PAP and importance of compliance with " "therapy were discussed.  Pressure setting: continue 16 cmH2O.    Rx provided to replace supplies and Care coordinated with DME provider.   Continue medication for allergies and acid reflux.  Discussed strategies for weight reduction.    Advised allowing sufficient opportunity for sleep.  He tried doxepin but discontinued use because of adverse effect.  He may consider melatonin 2 mg extended release.  Follow-up is advised in 1 year or sooner if needed to monitor progress, compliance and to adjust therapy.     Thank you for allowing me to participate in the care of this patient.    Sincerely,     Authenticated electronically on 02/20/24   Board Certified Specialist     Portions of the record may have been created with voice recognition software. Occasional wrong word or \"sound a like\" substitutions may have occurred due to the inherent limitations of voice recognition software. There may also be notations and random deletions of words or characters from malfunctioning software. Read the chart carefully and recognize, using context, where substitutions/deletions have occurred.    "

## 2024-02-20 NOTE — PATIENT INSTRUCTIONS

## 2024-02-22 ENCOUNTER — TELEPHONE (OUTPATIENT)
Dept: SLEEP CENTER | Facility: CLINIC | Age: 64
End: 2024-02-22

## 2024-02-23 LAB

## 2024-03-01 ENCOUNTER — HOSPITAL ENCOUNTER (OUTPATIENT)
Dept: HOSPITAL 99 - DHCBC MAIN | Age: 64
End: 2024-03-01
Payer: COMMERCIAL

## 2024-03-01 DIAGNOSIS — Q23.1: Primary | ICD-10-CM

## 2024-03-18 ENCOUNTER — OFFICE VISIT (OUTPATIENT)
Dept: PAIN MEDICINE | Facility: CLINIC | Age: 64
End: 2024-03-18
Payer: COMMERCIAL

## 2024-03-18 ENCOUNTER — APPOINTMENT (OUTPATIENT)
Dept: RADIOLOGY | Facility: CLINIC | Age: 64
End: 2024-03-18
Payer: COMMERCIAL

## 2024-03-18 VITALS
BODY MASS INDEX: 31.07 KG/M2 | HEART RATE: 56 BPM | TEMPERATURE: 97.8 F | DIASTOLIC BLOOD PRESSURE: 78 MMHG | SYSTOLIC BLOOD PRESSURE: 120 MMHG | HEIGHT: 68 IN | WEIGHT: 205 LBS

## 2024-03-18 DIAGNOSIS — M25.551 RIGHT HIP PAIN: ICD-10-CM

## 2024-03-18 DIAGNOSIS — M46.1 SACROILIITIS (HCC): Primary | ICD-10-CM

## 2024-03-18 DIAGNOSIS — M47.816 LUMBAR SPONDYLOSIS: ICD-10-CM

## 2024-03-18 PROCEDURE — 99214 OFFICE O/P EST MOD 30 MIN: CPT | Performed by: PHYSICIAN ASSISTANT

## 2024-03-18 PROCEDURE — 72110 X-RAY EXAM L-2 SPINE 4/>VWS: CPT

## 2024-03-18 PROCEDURE — 73502 X-RAY EXAM HIP UNI 2-3 VIEWS: CPT

## 2024-03-18 NOTE — PROGRESS NOTES
Assessment:  1. Sacroiliitis (HCC)    2. Right hip pain    3. Lumbar spondylosis        Plan:  While the patient was in the office today, I did have a thorough conversation regarding their chronic pain syndrome, medication management, and treatment plan options.    Patient has symptoms consistent with sacroiliitis however there may be some hip joint involvement.  After discussing options, I feel is reasonable to order updated images therefore I will have the patient go for an x-ray of the lumbosacral spine and the right hip joint.  Depending upon his results and his symptoms, he may be a candidate for an SI joint injection versus a right hip intra-articular injection.    In the meantime he will continue to modify his exercises and perform sacroiliac joint stretches.  Continue the use of Voltaren gel topically, Lidoderm patches and meloxicam 15 mg daily.    Once we obtain the results of the x-rays we will contact him to discuss the next step in his treatment plan.    The patient was advised to contact the office should their symptoms worsen in the interim. The patient was agreeable and verbalized an understanding.        History of Present Illness:    The patient is a 63 y.o. male last seen on 4/14/2023 who presents for a follow up office visit in regards to low back pain .  The patient currently reports severe right-sided low back pain that began a few weeks ago after playing basketball and doing a HIIT workout that involved quite a bit of jumping jacks and burpees.  He states that the pain is localized to the right side with referred pain patterns into the right buttock, hip and groin area.  Patient denies any lower extremity radicular features.  He rates his current pain score 5 out of 10 and describes it as a constant cramping, pressure-like and stiffness sensation.  Since then he has modified his exercise regimen and he is avoiding any high impact movements at this point.  Patient presents today to discuss  potential diagnoses and treatment options    I have personally reviewed and/or updated the patient's past medical history, past surgical history, family history, social history, current medications, allergies, and vital signs today.       Review of Systems:    Review of Systems   Respiratory:  Negative for shortness of breath.    Cardiovascular:  Negative for chest pain.   Gastrointestinal:  Negative for constipation, diarrhea, nausea and vomiting.   Musculoskeletal:  Positive for gait problem. Negative for arthralgias, joint swelling (Joint stiffness) and myalgias.   Skin:  Negative for rash.   Neurological:  Negative for dizziness, seizures and weakness.   All other systems reviewed and are negative.        Past Medical History:   Diagnosis Date   • Arthritis     Fingers, lower back ,right hip   • Cancer (HCC)    • Chronic pain    • CPAP (continuous positive airway pressure) dependence    • GERD (gastroesophageal reflux disease)    • Hemangioma of skin    • Hypertension    • Irritable bowel syndrome    • Lumbar spondylolysis    • Multiple pigmented nevi    • Obstructive sleep apnea syndrome    • PONV (postoperative nausea and vomiting)    • Premature atrial beats    • Premature ventricular beats    • Rectus diastasis    • Sleep apnea    • Umbilical hernia        Past Surgical History:   Procedure Laterality Date   • HERNIA REPAIR     • KNEE ARTHROSCOPY Right    • ID RPR UMBILICAL HRNA 5 YRS/> REDUCIBLE N/A 12/12/2017    Procedure: OPEN UMBILICAL HERNIA REPAIR WITH MESH;  Surgeon: Marvin Magana MD;  Location:  MAIN OR;  Service: General   • WISDOM TOOTH EXTRACTION Left        Family History   Problem Relation Age of Onset   • Rheum arthritis Mother    • Osteoarthritis Mother    • Thrombocytopenia Mother    • Diabetes Mother 85   • Heart disease Father    • Hypertension Father    • Coronary artery disease Father    • No Known Problems Sister    • No Known Problems Brother        Social History     Occupational  "History   • Not on file   Tobacco Use   • Smoking status: Never     Passive exposure: Never   • Smokeless tobacco: Never   Vaping Use   • Vaping status: Never Used   Substance and Sexual Activity   • Alcohol use: Yes     Alcohol/week: 4.0 standard drinks of alcohol     Types: 4 Cans of beer per week     Comment: Soc   • Drug use: No   • Sexual activity: Yes     Partners: Female     Birth control/protection: None         Current Outpatient Medications:   •  amLODIPine (NORVASC) 5 mg tablet, Take 1 tablet (5 mg total) by mouth daily, Disp: 90 tablet, Rfl: 1  •  chlorthalidone 25 mg tablet, Take 0.5 tablets (12.5 mg total) by mouth daily, Disp: 30 tablet, Rfl: 6  •  Diclofenac Sodium (VOLTAREN) 1 %, Apply 4 g topically 4 (four) times a day, Disp: 100 g, Rfl: 0  •  famotidine (PEPCID) 20 mg tablet, Take 20 mg by mouth daily at bedtime, Disp: , Rfl:   •  fluticasone (FLONASE) 50 mcg/act nasal spray, 1 spray into each nostril 2 (two) times a day as needed for rhinitis (sinus pressure), Disp: 9.9 mL, Rfl: 1  •  pantoprazole (PROTONIX) 40 mg tablet, Take 40 mg by mouth daily, Disp: , Rfl:   •  valsartan (DIOVAN) 80 mg tablet, Take 1 tablet (80 mg total) by mouth every 24 hours, Disp: 90 tablet, Rfl: 1    Allergies   Allergen Reactions   • Lisinopril Swelling   • Ibuprofen Rash and Hives   • Molds & Smuts Itching   • Penicillins Rash     Other reaction(s): Rash       Physical Exam:    /78 (BP Location: Left arm, Patient Position: Sitting, Cuff Size: Standard)   Pulse 56   Temp 97.8 °F (36.6 °C)   Ht 5' 8\" (1.727 m)   Wt 93 kg (205 lb)   BMI 31.17 kg/m²     Constitutional:normal, well developed, well nourished, alert, in no distress and non-toxic and no overt pain behavior.  Eyes:anicteric  HEENT:grossly intact  Neck:supple, symmetric, trachea midline and no masses   Pulmonary:even and unlabored  Cardiovascular:No edema or pitting edema present  Skin:Normal without rashes or lesions and well " hydrated  Psychiatric:Mood and affect appropriate  Neurologic:Cranial Nerves II-XII grossly intact  Musculoskeletal: Tender right sacroiliac joint, tender right piriformis, full range of motion of the lumbar spine without pain.  Painful range of motion of the right hip joint.  Right + Joseph finger sign + Patricks test +Gaenslen's test+ Posterior pelvic pain provocation      Imaging  XR hip/pelv 2-3 vws right if performed    (Results Pending)   X-ray lumbar spine 2 or 3 views    (Results Pending)         Orders Placed This Encounter   Procedures   • XR hip/pelv 2-3 vws right if performed   • X-ray lumbar spine 2 or 3 views

## 2024-03-19 ENCOUNTER — TELEPHONE (OUTPATIENT)
Dept: PAIN MEDICINE | Facility: CLINIC | Age: 64
End: 2024-03-19

## 2024-03-19 DIAGNOSIS — M46.1 SACROILIITIS (HCC): Primary | ICD-10-CM

## 2024-03-19 NOTE — TELEPHONE ENCOUNTER
S/w pt, advised of hip and lumbar spine x-ray results. Pt stated that he and his urologist is aware of the R kidney stone. Per pt, he would like to proceed with SIJ as discussed. Advised pt, the writer will make MG aware. Anticipate a cb from [facility] surg coord to schedule as discussed. Pt verbalized understanding and appreciation .

## 2024-03-19 NOTE — TELEPHONE ENCOUNTER
----- Message from Elise Rodríguez PA-C sent at 3/19/2024  8:30 AM EDT -----  Please let patient know his right hip xray shows mild degenerative changes.  I'd recommend the SIJ injection we discussed if he is interested.

## 2024-03-19 NOTE — TELEPHONE ENCOUNTER
----- Message from Elise Rodríguez PA-C sent at 3/19/2024  8:33 AM EDT -----  Please let patient know his lumbar xray shows mild-moderate degenerative changes.   There is also mention of a small right kidney stone. I would encourage him to increase his water intake and can follow up with his PCP if needed.

## 2024-03-20 NOTE — TELEPHONE ENCOUNTER
PRE OP INSTRUCTIONS:  -If you are on prescription blood thinners, you may have to hold the medication for several days before the procedure.    Please call the office to discuss medication holds at [phone].    -Do not eat or drink ONE HOUR prior to your procedure. If you are diabetic, may follow regular breakfast/lunch schedule and take usual    diabetic medications.  -Lumbar( low back) procedure, please wear comfortable slacks/pants.  -Cervical (neck) procedure, please wear a shirt/blouse that is easy to remove.  -A  is required to take you home form your procedure.  -Continue all to take prescribed medication the day of your procedure, including blood pressure medications.  -If you are prescribe antibiotics, have an active infection or have an open wound, please contact the office at [phone].  -Please refrain from any vaccinations two weeks before and two weeks after injection.  -Insurance authorization received in not a guarantee of payment per your insurance company's authorization disclaimer and it is    your responsibility to verify your benefits.          -If you have any questions about the instructions, please call me at [phone]

## 2024-04-04 ENCOUNTER — TELEPHONE (OUTPATIENT)
Dept: PAIN MEDICINE | Facility: CLINIC | Age: 64
End: 2024-04-04

## 2024-04-04 NOTE — TELEPHONE ENCOUNTER
Caller: Cooper    Doctor: Marcos    Reason for call: when patient called Epic was down I was unable to give him  information he was requesting     Please call and confirm date and time of his procedure 4/11 at 8am     Thank you     Call back#: [phone]

## 2024-04-11 ENCOUNTER — HOSPITAL ENCOUNTER (OUTPATIENT)
Dept: RADIOLOGY | Facility: CLINIC | Age: 64
Discharge: HOME | End: 2024-04-11
Payer: COMMERCIAL

## 2024-04-11 VITALS
DIASTOLIC BLOOD PRESSURE: 87 MMHG | RESPIRATION RATE: 18 BRPM | TEMPERATURE: 97.7 F | OXYGEN SATURATION: 94 % | HEART RATE: 62 BPM | SYSTOLIC BLOOD PRESSURE: 132 MMHG

## 2024-04-11 DIAGNOSIS — M46.1 SACROILIITIS (HCC): ICD-10-CM

## 2024-04-11 PROCEDURE — 27096 INJECT SACROILIAC JOINT: CPT | Performed by: ANESTHESIOLOGY

## 2024-04-11 RX ORDER — METHYLPREDNISOLONE ACETATE 80 MG/ML
80 INJECTION, SUSPENSION INTRA-ARTICULAR; INTRALESIONAL; INTRAMUSCULAR; PARENTERAL; SOFT TISSUE ONCE
Status: COMPLETED | OUTPATIENT
Start: 2024-04-11 | End: 2024-04-11

## 2024-04-11 RX ORDER — ROPIVACAINE HYDROCHLORIDE 2 MG/ML
1 INJECTION, SOLUTION EPIDURAL; INFILTRATION; PERINEURAL ONCE
Status: COMPLETED | OUTPATIENT
Start: 2024-04-11 | End: 2024-04-11

## 2024-04-11 RX ADMIN — METHYLPREDNISOLONE ACETATE 80 MG: 80 INJECTION, SUSPENSION INTRA-ARTICULAR; INTRALESIONAL; INTRAMUSCULAR; SOFT TISSUE at 08:42

## 2024-04-11 RX ADMIN — ROPIVACAINE HYDROCHLORIDE 1 ML: 2 INJECTION EPIDURAL; INFILTRATION; PERINEURAL at 08:42

## 2024-04-11 RX ADMIN — IOHEXOL 0.2 ML: 300 INJECTION, SOLUTION INTRAVENOUS at 08:42

## 2024-04-11 NOTE — DISCHARGE INSTRUCTIONS

## 2024-04-11 NOTE — H&P
History of Present Illness: The patient is a 63 y.o. male who presents with complaints of low back pain.    Past Medical History:   Diagnosis Date    Arthritis     Fingers, lower back ,right hip    Cancer (HCC)     Chronic pain     CPAP (continuous positive airway pressure) dependence     GERD (gastroesophageal reflux disease)     Hemangioma of skin     Hypertension     Irritable bowel syndrome     Lumbar spondylolysis     Multiple pigmented nevi     Obstructive sleep apnea syndrome     PONV (postoperative nausea and vomiting)     Premature atrial beats     Premature ventricular beats     Rectus diastasis     Sleep apnea     Umbilical hernia        Past Surgical History:   Procedure Laterality Date    HERNIA REPAIR      KNEE ARTHROSCOPY Right     CT RPR UMBILICAL HRNA 5 YRS/> REDUCIBLE N/A 12/12/2017    Procedure: OPEN UMBILICAL HERNIA REPAIR WITH MESH;  Surgeon: Marvin Magana MD;  Location:  MAIN OR;  Service: General    WISDOM TOOTH EXTRACTION Left          Current Outpatient Medications:     amLODIPine (NORVASC) 5 mg tablet, Take 1 tablet (5 mg total) by mouth daily, Disp: 90 tablet, Rfl: 1    chlorthalidone 25 mg tablet, Take 0.5 tablets (12.5 mg total) by mouth daily, Disp: 30 tablet, Rfl: 6    Diclofenac Sodium (VOLTAREN) 1 %, Apply 4 g topically 4 (four) times a day, Disp: 100 g, Rfl: 0    famotidine (PEPCID) 20 mg tablet, Take 20 mg by mouth daily at bedtime, Disp: , Rfl:     fluticasone (FLONASE) 50 mcg/act nasal spray, 1 spray into each nostril 2 (two) times a day as needed for rhinitis (sinus pressure), Disp: 9.9 mL, Rfl: 1    pantoprazole (PROTONIX) 40 mg tablet, Take 40 mg by mouth daily, Disp: , Rfl:     valsartan (DIOVAN) 80 mg tablet, Take 1 tablet (80 mg total) by mouth every 24 hours, Disp: 90 tablet, Rfl: 1    Current Facility-Administered Medications:     iohexol (OMNIPAQUE) 300 mg/mL injection 0.2 mL, 0.2 mL, Intra-articular, Once, London Yang DO    methylPREDNISolone acetate (DEPO-MEDROL)  injection 80 mg, 80 mg, Intra-articular, Once, London Yang DO    ropivacaine (NAROPIN) injection 1 mL, 1 mL, Intra-articular, Once, London Yang DO    Allergies   Allergen Reactions    Lisinopril Swelling    Ibuprofen Rash and Hives    Molds & Smuts Itching    Penicillins Rash     Other reaction(s): Rash       Physical Exam:   Vitals:    04/11/24 0817   BP: 127/87   Pulse: 64   Resp: 20   Temp: 97.7 °F (36.5 °C)   SpO2: 96%     General: Awake, Alert, Oriented x 3, Mood and affect appropriate  Respiratory: Respirations even and unlabored  Cardiovascular: Peripheral pulses intact; no edema  Musculoskeletal Exam: Normal gait    ASA Score: II    Patient/Chart Verification  Patient ID Verified: Verbal  ID Band Applied: No  Consents Confirmed: Procedural  H&P( within 30 days) Verified: To be obtained in the Pre-Procedure area  Interval H&P(within 24 hr) Complete (required for Outpatients and Surgery Admit only): To be obtained in the Pre-Procedure area  Allergies Reviewed: Yes  Anticoag/NSAID held?: No  Currently on antibiotics?: No  Pre-op Lab/Test Results Available: In chart    Assessment:   1. Sacroiliitis (HCC)        Plan: RT SIJ

## 2024-04-18 ENCOUNTER — TELEPHONE (OUTPATIENT)
Dept: PAIN MEDICINE | Facility: CLINIC | Age: 64
End: 2024-04-18

## 2024-04-18 NOTE — TELEPHONE ENCOUNTER
Caller: Patient    Doctor: Trey    #: [phone]    % of improvement: 70%    Pain Scale (0-10): 1-2/10 Mostly when sitting

## 2024-05-30 ENCOUNTER — OFFICE VISIT (OUTPATIENT)
Dept: FAMILY MEDICINE CLINIC | Facility: CLINIC | Age: 64
End: 2024-05-30
Payer: COMMERCIAL

## 2024-05-30 VITALS
OXYGEN SATURATION: 97 % | RESPIRATION RATE: 16 BRPM | HEART RATE: 59 BPM | WEIGHT: 203.2 LBS | BODY MASS INDEX: 30.8 KG/M2 | SYSTOLIC BLOOD PRESSURE: 120 MMHG | DIASTOLIC BLOOD PRESSURE: 88 MMHG | HEIGHT: 68 IN | TEMPERATURE: 97.9 F

## 2024-05-30 DIAGNOSIS — Z13.29 SCREENING FOR ENDOCRINE, METABOLIC AND IMMUNITY DISORDER: ICD-10-CM

## 2024-05-30 DIAGNOSIS — K76.0 FATTY LIVER: ICD-10-CM

## 2024-05-30 DIAGNOSIS — Z13.228 SCREENING FOR ENDOCRINE, METABOLIC AND IMMUNITY DISORDER: ICD-10-CM

## 2024-05-30 DIAGNOSIS — Q23.1 BICUSPID AORTIC VALVE: ICD-10-CM

## 2024-05-30 DIAGNOSIS — Z12.5 SCREENING FOR PROSTATE CANCER: ICD-10-CM

## 2024-05-30 DIAGNOSIS — Z00.00 ANNUAL PHYSICAL EXAM: Primary | ICD-10-CM
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
## 2024-05-30 DIAGNOSIS — Z13.0 SCREENING FOR ENDOCRINE, METABOLIC AND IMMUNITY DISORDER: ICD-10-CM

## 2024-05-30 DIAGNOSIS — Z13.6 SCREENING FOR CARDIOVASCULAR CONDITION: ICD-10-CM

## 2024-05-30 DIAGNOSIS — Z13.1 SCREENING FOR DIABETES MELLITUS: ICD-10-CM

## 2024-05-30 PROBLEM — D68.9 BLOOD CLOTTING DISORDER (HCC): Status: RESOLVED | Noted: 2022-02-21 | Resolved: 2024-05-30

## 2024-05-30 PROBLEM — N20.0 RENAL CALCULUS, RIGHT: Status: ACTIVE | Noted: 2023-10-26

## 2024-05-30 PROCEDURE — 99396 PREV VISIT EST AGE 40-64: CPT | Performed by: FAMILY MEDICINE

## 2024-05-30 RX ORDER — VALSARTAN 160 MG/1
160 TABLET ORAL DAILY
COMMUNITY
Start: 2024-03-14

## 2024-05-30 NOTE — PROGRESS NOTES
Adult Annual Physical  Name: Cain Ni      : 1960      MRN: [id number]  Encounter Provider: Fina Scott DO  Encounter Date: 2024   Encounter department: [facility]    Assessment & Plan   1. Annual physical exam  -     CBC and differential; Future  -     Comprehensive metabolic panel; Future  -     LDL cholesterol, direct; Future  -     Lipid panel; Future  -     TSH, 3rd generation with Free T4 reflex; Future  -     UA (URINE) with reflex to Scope; Future  -     PSA, Total Screen; Future  2. Bicuspid aortic valve  Assessment & Plan:  The patient is currently stable and will follow-up with his cardiologist as scheduled.  3. Fatty liver  Assessment & Plan:  The patient will continue to work on his diet and exercise and losing weight and will go for the additional testing as indicated.  Orders:  -     Comprehensive metabolic panel; Future  -     CANO Fibrosure; Future  -     Gamma GT; Future  -     US abdomen complete; Future; Expected date: 2024  4. Tick bite, unspecified site, initial encounter  -     Lyme Total AB W Reflex to IGM/IGG; Future  5. Screening for cardiovascular condition  -     CBC and differential; Future  -     Comprehensive metabolic panel; Future  -     LDL cholesterol, direct; Future  -     Lipid panel; Future  -     TSH, 3rd generation with Free T4 reflex; Future  -     UA (URINE) with reflex to Scope; Future  6. Screening for diabetes mellitus  -     CBC and differential; Future  -     Comprehensive metabolic panel; Future  -     LDL cholesterol, direct; Future  -     Lipid panel; Future  -     TSH, 3rd generation with Free T4 reflex; Future  -     UA (URINE) with reflex to Scope; Future  7. Screening for endocrine, metabolic and immunity disorder  -     CBC and differential; Future  -     Comprehensive metabolic panel; Future  -     LDL cholesterol, direct; Future  -     Lipid panel; Future  -     TSH, 3rd generation with Free T4  reflex; Future  -     UA (URINE) with reflex to Scope; Future  8. Screening for prostate cancer  -     PSA, Total Screen; Future  Immunizations and preventive care screenings were discussed with patient today. Appropriate education was printed on patient's after visit summary.  The patient will go for the lab work as ordered and we will follow-up with the results when available.  Discussed risks and benefits of prostate cancer screening. We discussed the controversial history of PSA screening for prostate cancer in the [place] as well as the risk of over detection and over treatment of prostate cancer by way of PSA screening.  The patient understands that PSA blood testing is an imperfect way to screen for prostate cancer and that elevated PSA levels in the blood may also be caused by infection, inflammation, prostatic trauma or manipulation, urological procedures, or by benign prostatic enlargement.    The role of the digital rectal examination in prostate cancer screening was also discussed and I discussed with him that there is large interobserver variability in the findings of digital rectal examination.    Counseling:  Dental Health: discussed importance of regular tooth brushing, flossing, and dental visits.  Injury prevention: discussed safety/seat belts, safety helmets, smoke detectors, carbon dioxide detectors, and smoking near bedding or upholstery.  Exercise: the importance of regular exercise/physical activity was discussed. Recommend exercise 3-5 times per week for at least 30 minutes.       Depression Screening and Follow-up Plan: Patient was screened for depression during today's encounter. They screened negative with a PHQ-2 score of 0.        History of Present Illness     Adult Annual Physical:  Patient presents for annual physical. Cain Ni is a 64 y.o. male who presents today for routine physical.  He has ongoing issues with arthritis. He sees the chiropractor.  He is exercising  regularly.  The patient denies any chest pain, shortness of breath, or palpitations.  There is no edema.  There are no headaches or visual changes.  There is no lightheadedness, dizziness, or fainting spells.  There are no palpitations.  He sees the Cardiologist. He has not taken the Valsartan in 2 weeks - he needs the medication refilled.    The patient currently denies any nausea, vomiting, or GERD symptoms.  he has normal bowel movements and normal urine output.  he has a normal appetite.  There is occasional heartburn.  He sees the Urologist.    He sees the dermatologist tomorrow.  .     Diet and Physical Activity:  - Diet/Nutrition: well balanced diet, low fat diet, high fat diet and consuming 3-5 servings of fruits/vegetables daily.  - Exercise: moderate cardiovascular exercise and 5-7 times a week on average.    Depression Screening:  - PHQ-2 Score: 0    General Health:  - Sleep: sleeps well.  - Hearing: normal hearing right ear.  - Vision: no vision problems, goes for regular eye exams, most recent eye exam > 1 year ago and wears glasses.  - Dental: regular dental visits and brushes teeth twice daily.     Health:  - History of STDs: no.   - Urinary symptoms: none.     Advanced Care Planning:  - Has an advanced directive?: yes    - Has a durable medical POA?: yes    - ACP document given to patient?: no      Review of Systems   Constitutional: Negative.    HENT: Negative.     Eyes: Negative.    Respiratory: Negative.     Cardiovascular: Negative.    Gastrointestinal: Negative.    Endocrine: Negative.    Genitourinary: Negative.    Musculoskeletal: Negative.    Skin: Negative.    Allergic/Immunologic: Negative.    Neurological: Negative.    Hematological: Negative.    Psychiatric/Behavioral: Negative.       Medical History Reviewed by provider this encounter:  Tobacco  Allergies  Meds  Problems  Med Hx  Surg Hx  Fam Hx  Soc   Hx      Current Outpatient Medications on File Prior to Visit   Medication  "Sig Dispense Refill   • amLODIPine (NORVASC) 5 mg tablet Take 1 tablet (5 mg total) by mouth daily 90 tablet 1   • chlorthalidone 25 mg tablet Take 0.5 tablets (12.5 mg total) by mouth daily 30 tablet 6   • Diclofenac Sodium (VOLTAREN) 1 % Apply 4 g topically 4 (four) times a day 100 g 0   • famotidine (PEPCID) 20 mg tablet Take 20 mg by mouth daily at bedtime     • fluticasone (FLONASE) 50 mcg/act nasal spray 1 spray into each nostril 2 (two) times a day as needed for rhinitis (sinus pressure) 9.9 mL 1   • pantoprazole (PROTONIX) 40 mg tablet Take 40 mg by mouth daily     • valsartan (DIOVAN) 160 mg tablet Take 160 mg by mouth daily     • valsartan (DIOVAN) 80 mg tablet Take 1 tablet (80 mg total) by mouth every 24 hours 90 tablet 1     No current facility-administered medications on file prior to visit.      Social History     Tobacco Use   • Smoking status: Never     Passive exposure: Never   • Smokeless tobacco: Never   Vaping Use   • Vaping status: Never Used   Substance and Sexual Activity   • Alcohol use: Yes     Alcohol/week: 6.0 standard drinks of alcohol     Types: 6 Cans of beer per week     Comment: Soc   • Drug use: No   • Sexual activity: Yes     Partners: Female     Birth control/protection: None       Objective     /88 (BP Location: Right arm, Patient Position: Sitting, Cuff Size: Large)   Pulse 59   Temp 97.9 °F (36.6 °C) (Tympanic)   Resp 16   Ht 5' 8\" (1.727 m)   Wt 92.2 kg (203 lb 3.2 oz)   SpO2 97%   BMI 30.90 kg/m²     Physical Exam  Vitals and nursing note reviewed.   Constitutional:       Appearance: Normal appearance. He is well-developed.   HENT:      Head: Normocephalic and atraumatic.      Right Ear: External ear normal.      Left Ear: External ear normal.      Nose: Nose normal.      Mouth/Throat:      Mouth: Mucous membranes are moist.      Pharynx: Oropharynx is clear. No oropharyngeal exudate.   Eyes:      Extraocular Movements: Extraocular movements intact.      " Conjunctiva/sclera: Conjunctivae normal.      Pupils: Pupils are equal, round, and reactive to light.   Neck:      Thyroid: No thyromegaly.      Trachea: No tracheal deviation.   Cardiovascular:      Rate and Rhythm: Normal rate and regular rhythm.      Pulses: Normal pulses.      Heart sounds: Normal heart sounds. No murmur heard.     No friction rub. No gallop.   Pulmonary:      Effort: Pulmonary effort is normal. No respiratory distress.      Breath sounds: Normal breath sounds. No stridor. No wheezing or rales.   Chest:      Chest wall: No tenderness.   Abdominal:      General: Bowel sounds are normal. There is no distension.      Palpations: Abdomen is soft. There is no mass.      Tenderness: There is no abdominal tenderness. There is no guarding or rebound.      Hernia: No hernia is present.   Genitourinary:     Penis: No tenderness.    Musculoskeletal:         General: No tenderness or deformity. Normal range of motion.      Cervical back: Normal range of motion and neck supple.   Lymphadenopathy:      Cervical: No cervical adenopathy.   Skin:     General: Skin is warm and dry.      Coloration: Skin is not pale.      Findings: No erythema or rash.   Neurological:      Mental Status: He is alert and oriented to person, place, and time.      Cranial Nerves: No cranial nerve deficit.      Sensory: No sensory deficit.      Motor: No abnormal muscle tone.      Coordination: Coordination normal.      Deep Tendon Reflexes: Reflexes normal.   Psychiatric:         Mood and Affect: Mood normal.         Behavior: Behavior normal.         Thought Content: Thought content normal.         Judgment: Judgment normal.       Administrative Statements   I have spent a total time of 40 minutes on 05/30/24 In caring for this patient including Diagnostic results, Risks and benefits of tx options, Patient and family education, Importance of tx compliance, Risk factor reductions, Documenting in the medical record, Reviewing /  ordering tests, medicine, procedures  , and Obtaining or reviewing history  .

## 2024-05-30 NOTE — ASSESSMENT & PLAN NOTE
The patient will continue to work on his diet and exercise and losing weight and will go for the additional testing as indicated.

## 2024-06-04 ENCOUNTER — NURSE TRIAGE (OUTPATIENT)
Age: 64
End: 2024-06-04

## 2024-06-04 DIAGNOSIS — U07.1 COVID-19: Primary | ICD-10-CM

## 2024-06-04 RX ORDER — NIRMATRELVIR AND RITONAVIR 300-100 MG
3 KIT ORAL 2 TIMES DAILY
Qty: 30 TABLET | Refills: 0 | Status: SHIPPED | OUTPATIENT
Start: 2024-06-04 | End: 2024-06-09

## 2024-06-04 NOTE — TELEPHONE ENCOUNTER
"Pt called with a positive Covid 19 test. Pt is having mild symptoms of nasal congestion, fever, chills, sore throat and cough. Pt wanted to be seen but no available appointments until late June. Pt is wondering if Paxlovid would be helpful for him or not and is wondering what Dr. Scott recommends for him.     Please f/u with pt today regarding his Covid 19 symptoms.       Reason for Disposition   [1] COVID-19 infection suspected by caller or triager AND [2] mild symptoms (cough, fever, or others) AND [3] negative COVID-19 rapid test    Answer Assessment - Initial Assessment Questions  1. COVID-19 DIAGNOSIS: \"Who made your COVID-19 diagnosis?\" \"Was it confirmed by a positive lab test or self-test?\" If not diagnosed by a doctor (or NP/PA), ask \"Are there lots of cases (community spread) where you live?\" Note: See public health department website, if unsure.      At home test-positive today   2. COVID-19 EXPOSURE: \"Was there any known exposure to COVID before the symptoms began?\" CDC Definition of close contact: within 6 feet (2 meters) for a total of 15 minutes or more over a 24-hour period.       Unsure   3. ONSET: \"When did the COVID-19 symptoms start?\"       Sunday morning  4. WORST SYMPTOM: \"What is your worst symptom?\" (e.g., cough, fever, shortness of breath, muscle aches)      Nose congestion and face inflammation   5. COUGH: \"Do you have a cough?\" If Yes, ask: \"How bad is the cough?\"        Yes, when laying down   6. FEVER: \"Do you have a fever?\" If Yes, ask: \"What is your temperature, how was it measured, and when did it start?\"      Unsure pos yesterday and took tylenol   7. RESPIRATORY STATUS: \"Describe your breathing?\" (e.g., shortness of breath, wheezing, unable to speak)       Denies   8. BETTER-SAME-WORSE: \"Are you getting better, staying the same or getting worse compared to yesterday?\"  If getting worse, ask, \"In what way?\"      Feel a little better than yesterday   9. HIGH RISK DISEASE: \"Do you have " "any chronic medical problems?\" (e.g., asthma, heart or lung disease, weak immune system, obesity, etc.)      Denies   10. VACCINE: \"Have you had the COVID-19 vaccine?\" If Yes, ask: \"Which one, how many shots, when did you get it?\"        Yes   11. BOOSTER: \"Have you received your COVID-19 booster?\" If Yes, ask: \"Which one and when did you get it?\"        1 booster   13. OTHER SYMPTOMS: \"Do you have any other symptoms?\"  (e.g., chills, fatigue, headache, loss of smell or taste, muscle pain, sore throat)        Sore throat, chills, fatigue, loss of smell  14. O2 SATURATION MONITOR:  \"Do you use an oxygen saturation monitor (pulse oximeter) at home?\" If Yes, ask \"What is your reading (oxygen level) today?\" \"What is your usual oxygen saturation reading?\" (e.g., 95%)        Denies    Protocols used: Coronavirus (COVID-19) Diagnosed or Suspected-ADULT-[place]    "

## 2024-06-18 ENCOUNTER — TELEPHONE (OUTPATIENT)
Age: 64
End: 2024-06-18

## 2024-06-18 NOTE — TELEPHONE ENCOUNTER
S/w pt, c/o pain in the R buttock into the groin and a little bit of pain in the lateral upper thigh x 2 - 3 weeks, very bad x the past 2 - 3 days. Pt stated that he had a chiro adj yesterday and he is feeling ~ 80% better today but it's still there. Pt feels that the pain may be due to golfing.     Reviewed 3/18/24 ov note where an SI joint injection was discussed:    He states that the pain is localized to the right side with referred pain patterns into the right buttock, hip and groin area. Patient denies any lower extremity radicular features.     Pt was unable to confirm that the pain is in the same places - stated that he does not know if this is SI joint pain or L5 pain. Advised pt, the writer will d/w MG and this office will cb to advise. Pt verbalized understandign and appreciation.     NOTE: 70% improvement 1 week after 4/11/24 R SI JOINT inj

## 2024-06-18 NOTE — TELEPHONE ENCOUNTER
Caller: santos    Doctor: demetria    Reason for call: pt would like to get an injection for his low back.    Call back#: [phone]

## 2024-06-19 NOTE — TELEPHONE ENCOUNTER
SIJ inj could be repeated but not until the 3 month brad. I'd recommend continuing the chiropractic treatment if he finds that to be beneficial.  If no improvement over the next few weeks,  he can schedule an OV to determine if another injection is indicated

## 2024-06-26 NOTE — TELEPHONE ENCOUNTER
ANJALI    S/w pt, advised pt of above. Per pt, his chiro is unsure where the pain is coming from as well. Pt stated that he has been seeing the chiro for this issue since the spring and would like to be evaluated to try and determine where the pain is coming from. Scheduled ov with MG on 7/12/2024 at 0900. Pt verbalized understanding and agreement. Will cb if questions or issues arise.

## 2024-07-12 ENCOUNTER — OFFICE VISIT (OUTPATIENT)
Dept: PAIN MEDICINE | Facility: CLINIC | Age: 64
End: 2024-07-12
Payer: COMMERCIAL

## 2024-07-12 VITALS
SYSTOLIC BLOOD PRESSURE: 126 MMHG | BODY MASS INDEX: 30.92 KG/M2 | WEIGHT: 204 LBS | HEART RATE: 58 BPM | HEIGHT: 68 IN | TEMPERATURE: 97.3 F | DIASTOLIC BLOOD PRESSURE: 82 MMHG

## 2024-07-12 DIAGNOSIS — M54.16 LUMBAR RADICULOPATHY: ICD-10-CM

## 2024-07-12 DIAGNOSIS — M76.01 GLUTEAL TENDINITIS OF RIGHT BUTTOCK: ICD-10-CM

## 2024-07-12 DIAGNOSIS — M25.551 RIGHT HIP PAIN: ICD-10-CM

## 2024-07-12 DIAGNOSIS — M47.816 LUMBAR SPONDYLOSIS: ICD-10-CM

## 2024-07-12 DIAGNOSIS — M79.18 MYOFASCIAL PAIN SYNDROME: Primary | ICD-10-CM

## 2024-07-12 DIAGNOSIS — G57.01 PIRIFORMIS SYNDROME OF RIGHT SIDE: ICD-10-CM

## 2024-07-12 PROCEDURE — 99214 OFFICE O/P EST MOD 30 MIN: CPT | Performed by: PHYSICIAN ASSISTANT

## 2024-07-12 RX ORDER — MELOXICAM 15 MG/1
15 TABLET ORAL DAILY
COMMUNITY

## 2024-07-12 NOTE — PROGRESS NOTES
Assessment:  1. Myofascial pain syndrome    2. Piriformis syndrome of right side    3. Gluteal tendinitis of right buttock    4. Lumbar radiculopathy    5. Lumbar spondylosis    6. Right hip pain        Plan:  While the patient was in the office today, I did have a thorough conversation regarding their chronic pain syndrome, medication management, and treatment plan options.    After discussing options, I feel it is reasonable to order an new MRI of the lumbar spine to rule out any new or worsening pathology to explain his increased pain.  I have also recommended an ultrasound of the right gluteal region for further evaluation, rule out gluteal tear/tendinosis.    We will schedule the patient for a right piriformis injection under fluoroscopic guidance.    Continue home exercises.    Complete risks and benefits including bleeding, infection, tissue reaction, nerve injury and allergic reaction were discussed. The approach was demonstrated using models and literature was provided. Verbal and written consent was obtained.    The patient was advised to contact the office should their symptoms worsen in the interim. The patient was agreeable and verbalized an understanding.        History of Present Illness:    The patient is a 64 y.o. male last seen on 4/11/2024 who presents for a follow up office visit in regards to right low back, buttock and hip pain.  The patient currently reports a pain level of 8 out of 10 today and describes as a constant throbbing, cramping type of pain.  Patient has the majority of the pain localized to the right buttock with referred pain laterally into the hip and occasionally into the groin.  Patient does note intermittent radicular features along the posterior aspect of the right lower extremity to the level of the knee.  He continues to be active with his home exercise program and works with a .  Patient continues with chiropractic therapy through Dr. Montes De Oca which provides very  temporary relief.  He seems to experience the most amount of pain after he has done playing golf.  He underwent a right SI joint injection on 4/11/2024 and reported 70% reduction in pain however 2 weeks later the same pain pattern returned.  X-ray of the lumbar spine reveals multilevel mild to moderate degenerative disc disease worse at L5-S1 and x-ray of the right hip shows mild degenerative changes.    I have personally reviewed and/or updated the patient's past medical history, past surgical history, family history, social history, current medications, allergies, and vital signs today.       Review of Systems:    Review of Systems   Respiratory:  Negative for shortness of breath.    Cardiovascular:  Negative for chest pain.   Gastrointestinal:  Negative for constipation, diarrhea, nausea and vomiting.   Musculoskeletal:  Positive for gait problem. Negative for arthralgias, joint swelling (Joint stiffness) and myalgias.   Skin:  Negative for rash.   Neurological:  Negative for dizziness, seizures and weakness.   All other systems reviewed and are negative.        Past Medical History:   Diagnosis Date   • Allergic    • Arthritis     Fingers, lower back ,right hip   • Cancer (HCC)    • Chronic pain    • CPAP (continuous positive airway pressure) dependence    • GERD (gastroesophageal reflux disease)    • Hemangioma of skin    • Hypertension    • Irritable bowel syndrome    • Kidney stone    • Lumbar spondylolysis    • Multiple pigmented nevi    • Obstructive sleep apnea syndrome    • PONV (postoperative nausea and vomiting)    • Premature atrial beats    • Premature ventricular beats    • Rectus diastasis    • Sleep apnea    • Umbilical hernia        Past Surgical History:   Procedure Laterality Date   • HERNIA REPAIR     • KNEE ARTHROSCOPY Right    • [place] RPR UMBILICAL HRNA 5 YRS/> REDUCIBLE N/A 12/12/2017    Procedure: OPEN UMBILICAL HERNIA REPAIR WITH MESH;  Surgeon: Marvin Magana MD;  Location: [facility];   Service: General   • WISDOM TOOTH EXTRACTION Left        Family History   Problem Relation Age of Onset   • Rheum arthritis Mother    • Osteoarthritis Mother    • Thrombocytopenia Mother    • Diabetes Mother 85   • Coronary artery disease Mother    • Arthritis Mother    • Heart disease Father    • Hypertension Father    • Coronary artery disease Father    • No Known Problems Sister    • No Known Problems Brother        Social History     Occupational History   • Not on file   Tobacco Use   • Smoking status: Never     Passive exposure: Never   • Smokeless tobacco: Never   Vaping Use   • Vaping status: Never Used   Substance and Sexual Activity   • Alcohol use: Yes     Alcohol/week: 6.0 standard drinks of alcohol     Types: 6 Cans of beer per week     Comment: Soc   • Drug use: No   • Sexual activity: Yes     Partners: Female     Birth control/protection: None         Current Outpatient Medications:   •  amLODIPine (NORVASC) 5 mg tablet, Take 1 tablet (5 mg total) by mouth daily, Disp: 90 tablet, Rfl: 1  •  chlorthalidone 25 mg tablet, Take 0.5 tablets (12.5 mg total) by mouth daily, Disp: 30 tablet, Rfl: 6  •  Diclofenac Sodium (VOLTAREN) 1 %, Apply 4 g topically 4 (four) times a day, Disp: 100 g, Rfl: 0  •  famotidine (PEPCID) 20 mg tablet, Take 20 mg by mouth daily at bedtime, Disp: , Rfl:   •  fluticasone (FLONASE) 50 mcg/act nasal spray, 1 spray into each nostril 2 (two) times a day as needed for rhinitis (sinus pressure), Disp: 9.9 mL, Rfl: 1  •  meloxicam (MOBIC) 15 mg tablet, Take 15 mg by mouth daily, Disp: , Rfl:   •  pantoprazole (PROTONIX) 40 mg tablet, Take 40 mg by mouth daily, Disp: , Rfl:   •  valsartan (DIOVAN) 160 mg tablet, Take 160 mg by mouth daily (Patient not taking: Reported on 7/12/2024), Disp: , Rfl:   •  valsartan (DIOVAN) 80 mg tablet, Take 1 tablet (80 mg total) by mouth every 24 hours (Patient not taking: Reported on 7/12/2024), Disp: 90 tablet, Rfl: 1    Allergies   Allergen Reactions  "  • Lisinopril Swelling   • Ibuprofen Rash and Hives   • Molds & Smuts Itching   • Penicillins Rash     Other reaction(s): Rash       Physical Exam:    /82 (BP Location: Left arm, Patient Position: Sitting, Cuff Size: Standard)   Pulse 58   Temp (!) 97.3 °F (36.3 °C)   Ht 5' 8\" (1.727 m)   Wt 92.5 kg (204 lb)   BMI 31.02 kg/m²     Constitutional:normal, well developed, well nourished, alert, in no distress and non-toxic and no overt pain behavior.  Eyes:anicteric  HEENT:grossly intact  Neck:supple, symmetric, trachea midline and no masses   Pulmonary:even and unlabored  Cardiovascular:No edema or pitting edema present  Skin:Normal without rashes or lesions and well hydrated  Psychiatric:Mood and affect appropriate  Neurologic:Cranial Nerves II-XII grossly intact  Musculoskeletal: Tender right gluteal/piriformis region.      Imaging  MRI lumbar spine wo contrast    (Results Pending)   US MSK limited    (Results Pending)   FL spine and pain procedure    (Results Pending)         Orders Placed This Encounter   Procedures   • MRI lumbar spine wo contrast   • US MSK limited   • FL spine and pain procedure   • Ambulatory referral to Physical Therapy       "

## 2024-08-05 ENCOUNTER — HOSPITAL ENCOUNTER (OUTPATIENT)
Dept: ULTRASOUND IMAGING | Facility: HOSPITAL | Age: 64
Discharge: HOME | End: 2024-08-05
Payer: COMMERCIAL

## 2024-08-05 DIAGNOSIS — M76.01 GLUTEAL TENDINITIS OF RIGHT BUTTOCK: ICD-10-CM

## 2024-08-05 PROCEDURE — 76882 US LMTD JT/FCL EVL NVASC XTR: CPT

## 2024-08-06 ENCOUNTER — TELEPHONE (OUTPATIENT)
Dept: PAIN MEDICINE | Facility: CLINIC | Age: 64
End: 2024-08-06

## 2024-08-06 NOTE — TELEPHONE ENCOUNTER
S/w pt and advised of the same, pt informed nurse that pt feels he is making great strides with PT and would like to hold off on injection that is scheduled for 8/16 of piriformis inj.  Nurse advised pt nurse to let [facility]  know to cancel upcoming injection.  Pt verbalized understanding and appreciative of call.    Please cx upcoming injection, TY.

## 2024-08-15 NOTE — TELEPHONE ENCOUNTER
LVM for Dr Montes De Oca at [facility] to request at least 6 weeks of office visits to get MRI approved     Fax [phone]

## 2024-08-19 ENCOUNTER — OFFICE VISIT (OUTPATIENT)
Dept: FAMILY MEDICINE CLINIC | Facility: CLINIC | Age: 64
End: 2024-08-19
Payer: COMMERCIAL

## 2024-08-19 VITALS
SYSTOLIC BLOOD PRESSURE: 118 MMHG | BODY MASS INDEX: 30.8 KG/M2 | WEIGHT: 203.2 LBS | OXYGEN SATURATION: 98 % | HEIGHT: 68 IN | RESPIRATION RATE: 16 BRPM | DIASTOLIC BLOOD PRESSURE: 74 MMHG | HEART RATE: 70 BPM | TEMPERATURE: 97.7 F

## 2024-08-19 DIAGNOSIS — L08.9 NONVENOMOUS BUG BITE OF SHOULDER OR UPPER ARM, INFECTED, LEFT, INITIAL ENCOUNTER: Primary | ICD-10-CM

## 2024-08-19 DIAGNOSIS — S40.262A NONVENOMOUS BUG BITE OF SHOULDER OR UPPER ARM, INFECTED, LEFT, INITIAL ENCOUNTER: Primary | ICD-10-CM
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
## 2024-08-19 DIAGNOSIS — S40.862A NONVENOMOUS BUG BITE OF SHOULDER OR UPPER ARM, INFECTED, LEFT, INITIAL ENCOUNTER: Primary | ICD-10-CM

## 2024-08-19 PROCEDURE — 99213 OFFICE O/P EST LOW 20 MIN: CPT | Performed by: FAMILY MEDICINE

## 2024-08-19 RX ORDER — TRIAMCINOLONE ACETONIDE 1 MG/G
CREAM TOPICAL 2 TIMES DAILY
Qty: 15 G | Refills: 0 | Status: SHIPPED | OUTPATIENT
Start: 2024-08-19

## 2024-08-19 RX ORDER — SULFAMETHOXAZOLE/TRIMETHOPRIM 800-160 MG
1 TABLET ORAL EVERY 12 HOURS SCHEDULED
Qty: 10 TABLET | Refills: 0 | Status: SHIPPED | OUTPATIENT
Start: 2024-08-19 | End: 2024-08-24

## 2024-08-19 NOTE — PROGRESS NOTES
"    Assessment/Plan:       Diagnoses and all orders for this visit:    Nonvenomous bug bite of shoulder or upper arm, infected, left, initial encounter  -     sulfamethoxazole-trimethoprim (BACTRIM DS) 800-160 mg per tablet; Take 1 tablet by mouth every 12 (twelve) hours for 5 days  -     triamcinolone (KENALOG) 0.1 % cream; Apply topically 2 (two) times a day        Meds as above follow-up as needed    Subjective:     Chief Complaint   Patient presents with    other     Skin infection        Patient ID: Cain Ni is a 64 y.o. male.    Patient had 2 bug bites on his left arm  1 is resolved the other 1 has not gotten swollen very itchy and has signs that is looking more infected        The following portions of the patient's history were reviewed and updated as appropriate: allergies, current medications, past family history, past medical history, past social history, past surgical history and problem list.    Review of Systems   Constitutional: Negative.    HENT: Negative.     Eyes: Negative.    Respiratory: Negative.     Cardiovascular: Negative.    Gastrointestinal: Negative.    Endocrine: Negative.    Genitourinary: Negative.    Musculoskeletal: Negative.    Skin: Negative.    Allergic/Immunologic: Negative.    Neurological: Negative.    Hematological: Negative.    Psychiatric/Behavioral: Negative.     All other systems reviewed and are negative.        Objective:    Vitals:    08/19/24 0959   BP: 138/90   BP Location: Left arm   Patient Position: Sitting   Cuff Size: Large   Pulse: 70   Resp: 16   Temp: 97.7 °F (36.5 °C)   TempSrc: Tympanic   SpO2: 98%   Weight: 92.2 kg (203 lb 3.2 oz)   Height: 5' 8\" (1.727 m)          Physical Exam  Vitals and nursing note reviewed.   Constitutional:       Appearance: Normal appearance.   HENT:      Head: Normocephalic.      Right Ear: Tympanic membrane, ear canal and external ear normal.      Left Ear: Tympanic membrane, ear canal and external ear normal.      Nose: " Nose normal.      Mouth/Throat:      Mouth: Mucous membranes are moist.   Eyes:      Conjunctiva/sclera: Conjunctivae normal.      Pupils: Pupils are equal, round, and reactive to light.   Cardiovascular:      Rate and Rhythm: Normal rate and regular rhythm.   Pulmonary:      Effort: Pulmonary effort is normal.      Breath sounds: Normal breath sounds.   Abdominal:      General: Abdomen is flat. Bowel sounds are normal.      Palpations: Abdomen is soft.   Musculoskeletal:         General: Normal range of motion.   Skin:     General: Skin is warm and dry.      Comments: Dime sized raised lesion on left inner upper arm  Red but no signs of any surrounding cellulitis   Neurological:      General: No focal deficit present.      Mental Status: He is alert and oriented to person, place, and time. Mental status is at baseline.   Psychiatric:         Mood and Affect: Mood normal.         Behavior: Behavior normal.         Thought Content: Thought content normal.         Judgment: Judgment normal.

## 2024-08-26 ENCOUNTER — HOSPITAL ENCOUNTER (OUTPATIENT)
Dept: MRI IMAGING | Facility: HOSPITAL | Age: 64
Discharge: HOME | End: 2024-08-26
Payer: COMMERCIAL

## 2024-08-26 DIAGNOSIS — M54.16 LUMBAR RADICULOPATHY: ICD-10-CM

## 2024-08-26 PROCEDURE — 72148 MRI LUMBAR SPINE W/O DYE: CPT

## 2024-08-27 ENCOUNTER — TELEPHONE (OUTPATIENT)
Dept: PAIN MEDICINE | Facility: CLINIC | Age: 64
End: 2024-08-27

## 2024-08-27 NOTE — TELEPHONE ENCOUNTER
----- Message from Elise Rodríguez PA-C sent at 8/27/2024 12:18 PM EDT -----  Please let the patient know that his lumbar spine MRI reveals a decrease in size of the L5-S1 disc protrusion and unchanged arthritic changes with moderate foraminal narrowing.  If patient continues with pain, I can discuss injection therapy with him  .  It appears he reported some improvement according to a previous task, so he may want to continue with conservative treatment for now.

## 2024-11-26 ENCOUNTER — APPOINTMENT (OUTPATIENT)
Dept: LAB | Facility: CLINIC | Age: 64
End: 2024-11-26
Payer: COMMERCIAL

## 2024-11-26 DIAGNOSIS — Z00.00 ANNUAL PHYSICAL EXAM: ICD-10-CM
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
## 2024-11-26 DIAGNOSIS — Z13.6 SCREENING FOR CARDIOVASCULAR CONDITION: ICD-10-CM

## 2024-11-26 DIAGNOSIS — Z13.228 SCREENING FOR ENDOCRINE, METABOLIC AND IMMUNITY DISORDER: ICD-10-CM

## 2024-11-26 DIAGNOSIS — Z13.1 SCREENING FOR DIABETES MELLITUS: ICD-10-CM

## 2024-11-26 DIAGNOSIS — Z13.0 SCREENING FOR ENDOCRINE, METABOLIC AND IMMUNITY DISORDER: ICD-10-CM

## 2024-11-26 DIAGNOSIS — K76.0 FATTY LIVER: ICD-10-CM

## 2024-11-26 DIAGNOSIS — Z12.5 SCREENING FOR PROSTATE CANCER: ICD-10-CM

## 2024-11-26 DIAGNOSIS — Z13.29 SCREENING FOR ENDOCRINE, METABOLIC AND IMMUNITY DISORDER: ICD-10-CM

## 2024-11-26 LAB
ALBUMIN SERPL BCG-MCNC: 4.5 G/DL (ref 3.5–5)
ALP SERPL-CCNC: 53 U/L (ref 34–104)
ALT SERPL W P-5'-P-CCNC: 28 U/L (ref 7–52)
ANION GAP SERPL CALCULATED.3IONS-SCNC: 6 MMOL/L (ref 4–13)
AST SERPL W P-5'-P-CCNC: 24 U/L (ref 13–39)
B BURGDOR IGG+IGM SER QL IA: NEGATIVE
BACTERIA UR QL AUTO: NORMAL /HPF
BASOPHILS # BLD AUTO: 0.03 THOUSANDS/ΜL (ref 0–0.1)
BASOPHILS NFR BLD AUTO: 1 % (ref 0–1)
BILIRUB SERPL-MCNC: 0.85 MG/DL (ref 0.2–1)
BILIRUB UR QL STRIP: NEGATIVE
BUN SERPL-MCNC: 16 MG/DL (ref 5–25)
CALCIUM SERPL-MCNC: 9.4 MG/DL (ref 8.4–10.2)
CHLORIDE SERPL-SCNC: 102 MMOL/L (ref 96–108)
CHOLEST SERPL-MCNC: 121 MG/DL (ref ?–200)
CLARITY UR: CLEAR
CO2 SERPL-SCNC: 32 MMOL/L (ref 21–32)
COLOR UR: ABNORMAL
CREAT SERPL-MCNC: 1.15 MG/DL (ref 0.6–1.3)
EOSINOPHIL # BLD AUTO: 0.12 THOUSAND/ΜL (ref 0–0.61)
EOSINOPHIL NFR BLD AUTO: 2 % (ref 0–6)
ERYTHROCYTE [DISTWIDTH] IN BLOOD BY AUTOMATED COUNT: 12.9 % (ref 11.6–15.1)
GFR SERPL CREATININE-BSD FRML MDRD: 67 ML/MIN/1.73SQ M
GGT SERPL-CCNC: 25 U/L (ref 9–64)
GLUCOSE P FAST SERPL-MCNC: 87 MG/DL (ref 65–99)
GLUCOSE UR STRIP-MCNC: NEGATIVE MG/DL
HCT VFR BLD AUTO: 46.5 % (ref 36.5–49.3)
HDLC SERPL-MCNC: 36 MG/DL
HGB BLD-MCNC: 15 G/DL (ref 12–17)
HGB UR QL STRIP.AUTO: NEGATIVE
IMM GRANULOCYTES # BLD AUTO: 0.01 THOUSAND/UL (ref 0–0.2)
IMM GRANULOCYTES NFR BLD AUTO: 0 % (ref 0–2)
KETONES UR STRIP-MCNC: NEGATIVE MG/DL
LDLC SERPL CALC-MCNC: 62 MG/DL (ref 0–100)
LDLC SERPL DIRECT ASSAY-MCNC: 81 MG/DL (ref 0–100)
LEUKOCYTE ESTERASE UR QL STRIP: NEGATIVE
LYMPHOCYTES # BLD AUTO: 1.45 THOUSANDS/ΜL (ref 0.6–4.47)
LYMPHOCYTES NFR BLD AUTO: 25 % (ref 14–44)
MCH RBC QN AUTO: 29.4 PG (ref 26.8–34.3)
MCHC RBC AUTO-ENTMCNC: 32.3 G/DL (ref 31.4–37.4)
MCV RBC AUTO: 91 FL (ref 82–98)
MONOCYTES # BLD AUTO: 0.49 THOUSAND/ΜL (ref 0.17–1.22)
MONOCYTES NFR BLD AUTO: 8 % (ref 4–12)
NEUTROPHILS # BLD AUTO: 3.73 THOUSANDS/ΜL (ref 1.85–7.62)
NEUTS SEG NFR BLD AUTO: 64 % (ref 43–75)
NITRITE UR QL STRIP: NEGATIVE
NON-SQ EPI CELLS URNS QL MICRO: NORMAL /HPF
NONHDLC SERPL-MCNC: 85 MG/DL
NRBC BLD AUTO-RTO: 0 /100 WBCS
PH UR STRIP.AUTO: 7 [PH]
PLATELET # BLD AUTO: 217 THOUSANDS/UL (ref 149–390)
PMV BLD AUTO: 11.7 FL (ref 8.9–12.7)
POTASSIUM SERPL-SCNC: 4.1 MMOL/L (ref 3.5–5.3)
PROT SERPL-MCNC: 6.9 G/DL (ref 6.4–8.4)
PROT UR STRIP-MCNC: ABNORMAL MG/DL
PSA SERPL-MCNC: 2.88 NG/ML (ref 0–4)
RBC # BLD AUTO: 5.11 MILLION/UL (ref 3.88–5.62)
RBC #/AREA URNS AUTO: NORMAL /HPF
SODIUM SERPL-SCNC: 140 MMOL/L (ref 135–147)
SP GR UR STRIP.AUTO: 1.02 (ref 1–1.03)
TRIGL SERPL-MCNC: 114 MG/DL (ref ?–150)
TSH SERPL DL<=0.05 MIU/L-ACNC: 1.72 UIU/ML (ref 0.45–4.5)
UROBILINOGEN UR STRIP-ACNC: <2 MG/DL
WBC # BLD AUTO: 5.83 THOUSAND/UL (ref 4.31–10.16)
WBC #/AREA URNS AUTO: NORMAL /HPF

## 2024-11-26 PROCEDURE — 83010 ASSAY OF HAPTOGLOBIN QUANT: CPT

## 2024-11-26 PROCEDURE — 86618 LYME DISEASE ANTIBODY: CPT

## 2024-11-26 PROCEDURE — 82977 ASSAY OF GGT: CPT

## 2024-11-26 PROCEDURE — G0103 PSA SCREENING: HCPCS

## 2024-11-26 PROCEDURE — 82247 BILIRUBIN TOTAL: CPT

## 2024-11-26 PROCEDURE — 83721 ASSAY OF BLOOD LIPOPROTEIN: CPT

## 2024-11-26 PROCEDURE — 84478 ASSAY OF TRIGLYCERIDES: CPT

## 2024-11-26 PROCEDURE — 84450 TRANSFERASE (AST) (SGOT): CPT

## 2024-11-26 PROCEDURE — 82465 ASSAY BLD/SERUM CHOLESTEROL: CPT

## 2024-11-26 PROCEDURE — 82172 ASSAY OF APOLIPOPROTEIN: CPT

## 2024-11-26 PROCEDURE — 81001 URINALYSIS AUTO W/SCOPE: CPT

## 2024-11-26 PROCEDURE — 84443 ASSAY THYROID STIM HORMONE: CPT

## 2024-11-26 PROCEDURE — 83883 ASSAY NEPHELOMETRY NOT SPEC: CPT

## 2024-11-26 PROCEDURE — 84460 ALANINE AMINO (ALT) (SGPT): CPT

## 2024-11-26 PROCEDURE — 85025 COMPLETE CBC W/AUTO DIFF WBC: CPT

## 2024-11-26 PROCEDURE — 82947 ASSAY GLUCOSE BLOOD QUANT: CPT

## 2024-11-26 PROCEDURE — 36415 COLL VENOUS BLD VENIPUNCTURE: CPT

## 2024-11-26 PROCEDURE — 80061 LIPID PANEL: CPT

## 2024-11-26 PROCEDURE — 80053 COMPREHEN METABOLIC PANEL: CPT

## 2024-11-27 ENCOUNTER — RESULTS FOLLOW-UP (OUTPATIENT)
Dept: FAMILY MEDICINE CLINIC | Facility: CLINIC | Age: 64
End: 2024-11-27

## 2024-11-27 LAB
A2 MACROGLOB SERPL-MCNC: 114 MG/DL (ref 110–276)
ALT SERPL W P-5'-P-CCNC: 32 IU/L (ref 0–55)
APO A-I SERPL-MCNC: 128 MG/DL (ref 101–178)
AST SERPL W P-5'-P-CCNC: 25 IU/L (ref 0–40)
BILIRUB SERPL-MCNC: 0.4 MG/DL (ref 0–1.2)
CHOLEST SERPL-MCNC: 135 MG/DL (ref 100–199)
FIBROSIS SCORING:: ABNORMAL
FIBROSIS STAGE SERPL QL: ABNORMAL
GGT SERPL-CCNC: 25 IU/L (ref 0–65)
GLUCOSE SERPL-MCNC: 91 MG/DL (ref 70–99)
HAPTOGLOB SERPL-MCNC: 128 MG/DL (ref 32–363)
INTERPRETATION: ABNORMAL
LABORATORY COMMENT REPORT: ABNORMAL
LIVER FIBR SCORE SERPL CALC.FIBROSURE: 0.12 (ref 0–0.21)
NASH GRADE SERPL QL: ABNORMAL
NASH SCORE SERPL: 0.51 (ref 0–0.25)
REF LAB TEST METHOD: ABNORMAL
SL AMB NASH SCORING: ABNORMAL
SL AMB STEATOSIS GRADE: ABNORMAL
SL AMB STEATOSIS SCORE: 0.53 (ref 0–0.4)
STEATOSIS SCORING: ABNORMAL
TEST PERFORMANCE INFO SPEC: ABNORMAL
TRIGL SERPL-MCNC: 116 MG/DL (ref 0–149)

## 2024-12-23 ENCOUNTER — CONSULT (OUTPATIENT)
Dept: SURGERY | Facility: HOSPITAL | Age: 64
End: 2024-12-23
Payer: COMMERCIAL

## 2024-12-23 VITALS — HEART RATE: 62 BPM | SYSTOLIC BLOOD PRESSURE: 129 MMHG | TEMPERATURE: 97.9 F | DIASTOLIC BLOOD PRESSURE: 82 MMHG

## 2024-12-23 DIAGNOSIS — R10.32 LEFT GROIN PAIN: Primary | ICD-10-CM

## 2024-12-23 PROCEDURE — 99213 OFFICE O/P EST LOW 20 MIN: CPT | Performed by: SURGERY

## 2025-01-02 NOTE — PROGRESS NOTES
Assessment/Plan:   Cain Ni is a 64 y.o.male who is here for Consult (LEFT GROIN //PT has pain in his left groin/abdomen for about 3 weeks. No changes with urination/bowels/eating. Has not felt or seen a lump or bulge. )  .    Plan: Left groin pain - no obvious inguinal hernia on exam, cont rest, ice or heat, ibuprofen, will check ultrasound of groin and scrotum to evaluate for causes of pain    Preoperative Clearance: None    HPI:  Cain Ni is a 64 y.o.male who was referred for evaluation of Consult (LEFT GROIN //PT has pain in his left groin/abdomen for about 3 weeks. No changes with urination/bowels/eating. Has not felt or seen a lump or bulge. )  .    Currently left groin pain.     ROS:  General ROS: negative  negative for - chills, fatigue, fever or night sweats, weight loss  Respiratory ROS: no cough, shortness of breath, or wheezing  Cardiovascular ROS: no chest pain or dyspnea on exertion  Genito-Urinary ROS: no dysuria, trouble voiding, or hematuria  Musculoskeletal ROS: negative for - gait disturbance, joint pain or muscle pain  Neurological ROS: no TIA or stroke symptoms  Left groin pain    [unfilled]  Lisinopril, Ibuprofen, Molds & smuts, and Penicillins    Current Outpatient Medications:     amLODIPine (NORVASC) 5 mg tablet, Take 1 tablet (5 mg total) by mouth daily, Disp: 90 tablet, Rfl: 1    chlorthalidone 25 mg tablet, Take 0.5 tablets (12.5 mg total) by mouth daily, Disp: 30 tablet, Rfl: 6    fluticasone (FLONASE) 50 mcg/act nasal spray, 1 spray into each nostril 2 (two) times a day as needed for rhinitis (sinus pressure), Disp: 9.9 mL, Rfl: 1    meloxicam (MOBIC) 15 mg tablet, Take 15 mg by mouth daily Once a week, Disp: , Rfl:     pantoprazole (PROTONIX) 40 mg tablet, Take 40 mg by mouth daily, Disp: , Rfl:     Diclofenac Sodium (VOLTAREN) 1 %, Apply 4 g topically 4 (four) times a day (Patient not taking: Reported on 12/19/2024), Disp: 100 g, Rfl: 0    famotidine (PEPCID) 20  mg tablet, Take 20 mg by mouth daily at bedtime (Patient not taking: Reported on 12/19/2024), Disp: , Rfl:     triamcinolone (KENALOG) 0.1 % cream, Apply topically 2 (two) times a day, Disp: 15 g, Rfl: 0    valsartan (DIOVAN) 160 mg tablet, Take 160 mg by mouth daily (Patient not taking: Reported on 12/19/2024), Disp: , Rfl:     valsartan (DIOVAN) 80 mg tablet, Take 1 tablet (80 mg total) by mouth every 24 hours (Patient not taking: Reported on 12/19/2024), Disp: 90 tablet, Rfl: 1  Past Medical History:   Diagnosis Date    Allergic     Arthritis     Fingers, lower back ,right hip    Cancer (HCC)     Chronic pain     CPAP (continuous positive airway pressure) dependence     GERD (gastroesophageal reflux disease)     Hemangioma of skin     Hypertension     Irritable bowel syndrome     Kidney stone     Lumbar spondylolysis     Multiple pigmented nevi     Obstructive sleep apnea syndrome     PONV (postoperative nausea and vomiting)     Premature atrial beats     Premature ventricular beats     Rectus diastasis     Sleep apnea     Umbilical hernia      Past Surgical History:   Procedure Laterality Date    HERNIA REPAIR      KNEE ARTHROSCOPY Right     [place] RPR UMBILICAL HRNA 5 YRS/> REDUCIBLE N/A 12/12/2017    Procedure: OPEN UMBILICAL HERNIA REPAIR WITH MESH;  Surgeon: Marvin Magana MD;  Location:  MAIN OR;  Service: General    WISDOM TOOTH EXTRACTION Left      Family History   Problem Relation Age of Onset    Rheum arthritis Mother     Osteoarthritis Mother     Thrombocytopenia Mother     Diabetes Mother 85    Coronary artery disease Mother     Arthritis Mother     Heart disease Father     Hypertension Father     Coronary artery disease Father     No Known Problems Sister     No Known Problems Brother       reports that he has never smoked. He has never been exposed to tobacco smoke. He has never used smokeless tobacco. He reports current alcohol use of about 6.0 standard drinks of alcohol per week. He reports that  "he does not use drugs.    Labs:   Lab Results   Component Value Date    WBC 5.83 11/26/2024    WBC 6.06 07/28/2023    WBC 6.27 11/30/2021    WBC 5.73 09/21/2015    HGB 15.0 11/26/2024    HGB 15.0 07/28/2023    HGB 15.3 11/30/2021    HGB 14.6 09/21/2015     11/26/2024     07/28/2023     11/30/2021     09/21/2015     Lab Results   Component Value Date    ALT 28 11/26/2024    ALT 32 11/26/2024    ALT 45 07/28/2023    ALT 41 06/23/2021    ALT 56 09/21/2015    AST 24 11/26/2024    AST 25 11/26/2024    AST 31 07/28/2023    AST 24 06/23/2021    AST 30 09/21/2015     This SmartLink has not been configured with any valid records.       PHYSICAL EXAM  General Appearance:    Alert, cooperative, no distress,    Head:    Normocephalic without obvious abnormality   Eyes:    PERRL, conjunctiva/corneas clear, EOM's intact        Neck:   Supple, no adenopathy, no JVD   Back:     Symmetric, no spinal or CVA tenderness   Lungs:     Clear to auscultation bilaterally, no wheezing or rhonchi   Heart:    Regular rate and rhythm, S1 and S2 normal, no murmur   Abdomen:     Soft +BS ND TTP left groin no obvious hernia   Extremities:   Extremities normal. No clubbing, cyanosis or edema   Psych:   Normal Affect, AOx3.    Neurologic:  Skin:   CNII-XII intact. Strength symmetric, speech intact    Warm, dry, intact, no visible rashes or lesions         Physical Exam              Some portions of this record may have been generated with voice recognition software. There may be translation, syntax,  or grammatical errors. Occasional wrong word or \"sound-a-like\" substitutions may have occurred due to the inherent limitations of the voice recognition software. Read the chart carefully and recognize, using context, where substitutions may have occurred. If you have any questions, please contact the dictating provider for clarification or correction, as needed. This encounter has been coded by a non-certified coder.   "

## 2025-01-07 ENCOUNTER — HOSPITAL ENCOUNTER (OUTPATIENT)
Dept: ULTRASOUND IMAGING | Facility: CLINIC | Age: 65
Discharge: HOME | End: 2025-01-07
Payer: COMMERCIAL

## 2025-01-07 DIAGNOSIS — R10.32 LEFT GROIN PAIN: ICD-10-CM

## 2025-01-07 PROCEDURE — 76705 ECHO EXAM OF ABDOMEN: CPT

## 2025-01-14 ENCOUNTER — TELEPHONE (OUTPATIENT)
Dept: SURGERY | Facility: HOSPITAL | Age: 65
End: 2025-01-14

## 2025-01-14 NOTE — TELEPHONE ENCOUNTER
Patient of Dr. Magana. Was seen in the office on 12/23/24 to evaluate left groin pain. Then had US done on 1/7/25. Saw the results in My Chart. Is asking if he needs to have a follow up appointment. Please advise.

## 2025-01-20 ENCOUNTER — OFFICE VISIT (OUTPATIENT)
Dept: SURGERY | Facility: HOSPITAL | Age: 65
End: 2025-01-20
Payer: COMMERCIAL

## 2025-01-20 VITALS
BODY MASS INDEX: 31.37 KG/M2 | SYSTOLIC BLOOD PRESSURE: 128 MMHG | HEART RATE: 62 BPM | TEMPERATURE: 98.1 F | WEIGHT: 207 LBS | HEIGHT: 68 IN | DIASTOLIC BLOOD PRESSURE: 84 MMHG

## 2025-01-20 DIAGNOSIS — K40.90 INGUINAL HERNIA: Primary | ICD-10-CM

## 2025-01-20 PROCEDURE — 99213 OFFICE O/P EST LOW 20 MIN: CPT | Performed by: SURGERY

## 2025-01-23 NOTE — PROGRESS NOTES
Assessment/Plan:   Cain Ni is a 64 y.o.male who is here for Follow-up (U/S 1/7/2025// WISHES TO DISCUSS OPTIONS//PT was last seen 12/23/2024 for a LIH and would like to discuss his options. No new symptoms since his last visit. )  .    Plan: LIH - discussed operative vs conservative mgt, surgical approaches, risks and benefits and patient has decided to wishes to consider options and timing. I will schedule at their earliest convenience.      Preoperative Clearance: None    HPI:  Cain Ni is a 64 y.o.male who was referred for evaluation of Follow-up (U/S 1/7/2025// WISHES TO DISCUSS OPTIONS//PT was last seen 12/23/2024 for a LIH and would like to discuss his options. No new symptoms since his last visit. )  .    Currently groin pain occasional .     ROS:  General ROS: negative  negative for - chills, fatigue, fever or night sweats, weight loss  Respiratory ROS: no cough, shortness of breath, or wheezing  Cardiovascular ROS: no chest pain or dyspnea on exertion  Genito-Urinary ROS: no dysuria, trouble voiding, or hematuria  Musculoskeletal ROS: negative for - gait disturbance, joint pain or muscle pain  Neurological ROS: no TIA or stroke symptoms  Groin pain    [unfilled]  Lisinopril, Ibuprofen, Molds & smuts, and Penicillins    Current Outpatient Medications:     amLODIPine (NORVASC) 5 mg tablet, Take 1 tablet (5 mg total) by mouth daily, Disp: 90 tablet, Rfl: 1    chlorthalidone 25 mg tablet, Take 0.5 tablets (12.5 mg total) by mouth daily, Disp: 30 tablet, Rfl: 6    fluticasone (FLONASE) 50 mcg/act nasal spray, 1 spray into each nostril 2 (two) times a day as needed for rhinitis (sinus pressure), Disp: 9.9 mL, Rfl: 1    meloxicam (MOBIC) 15 mg tablet, Take 15 mg by mouth daily Once a week, Disp: , Rfl:     pantoprazole (PROTONIX) 40 mg tablet, Take 40 mg by mouth daily, Disp: , Rfl:     Diclofenac Sodium (VOLTAREN) 1 %, Apply 4 g topically 4 (four) times a day (Patient not taking: Reported  on 1/16/2025), Disp: 100 g, Rfl: 0    famotidine (PEPCID) 20 mg tablet, Take 20 mg by mouth daily at bedtime (Patient not taking: Reported on 1/16/2025), Disp: , Rfl:     triamcinolone (KENALOG) 0.1 % cream, Apply topically 2 (two) times a day, Disp: 15 g, Rfl: 0    valsartan (DIOVAN) 160 mg tablet, Take 160 mg by mouth daily (Patient not taking: Reported on 1/16/2025), Disp: , Rfl:     valsartan (DIOVAN) 80 mg tablet, Take 1 tablet (80 mg total) by mouth every 24 hours (Patient not taking: Reported on 1/16/2025), Disp: 90 tablet, Rfl: 1  Past Medical History:   Diagnosis Date    Allergic     Arthritis     Fingers, lower back ,right hip    Cancer (HCC)     Chronic pain     CPAP (continuous positive airway pressure) dependence     GERD (gastroesophageal reflux disease)     Hemangioma of skin     Hypertension     Irritable bowel syndrome     Kidney stone     Lumbar spondylolysis     Multiple pigmented nevi     Obstructive sleep apnea syndrome     PONV (postoperative nausea and vomiting)     Premature atrial beats     Premature ventricular beats     Rectus diastasis     Sleep apnea     Umbilical hernia      Past Surgical History:   Procedure Laterality Date    HERNIA REPAIR      KNEE ARTHROSCOPY Right     [place] RPR UMBILICAL HRNA 5 YRS/> REDUCIBLE N/A 12/12/2017    Procedure: OPEN UMBILICAL HERNIA REPAIR WITH MESH;  Surgeon: Marvin Magana MD;  Location: [facility] OR;  Service: General    WISDOM TOOTH EXTRACTION Left      Family History   Problem Relation Age of Onset    Rheum arthritis Mother     Osteoarthritis Mother     Thrombocytopenia Mother     Diabetes Mother 85    Coronary artery disease Mother     Arthritis Mother     Heart disease Father     Hypertension Father     Coronary artery disease Father     No Known Problems Sister     No Known Problems Brother       reports that he has never smoked. He has never been exposed to tobacco smoke. He has never used smokeless tobacco. He reports current alcohol use of about  "6.0 standard drinks of alcohol per week. He reports that he does not use drugs.    Labs:   Lab Results   Component Value Date    WBC 5.83 11/26/2024    WBC 6.06 07/28/2023    WBC 6.27 11/30/2021    WBC 5.73 09/21/2015    HGB 15.0 11/26/2024    HGB 15.0 07/28/2023    HGB 15.3 11/30/2021    HGB 14.6 09/21/2015     11/26/2024     07/28/2023     11/30/2021     09/21/2015     Lab Results   Component Value Date    ALT 28 11/26/2024    ALT 32 11/26/2024    ALT 45 07/28/2023    ALT 41 06/23/2021    ALT 56 09/21/2015    AST 24 11/26/2024    AST 25 11/26/2024    AST 31 07/28/2023    AST 24 06/23/2021    AST 30 09/21/2015     This SmartLink has not been configured with any valid records.       PHYSICAL EXAM  General Appearance:    Alert, cooperative, no distress,    Head:    Normocephalic without obvious abnormality   Eyes:    PERRL, conjunctiva/corneas clear, EOM's intact        Neck:   Supple, no adenopathy, no JVD   Back:     Symmetric, no spinal or CVA tenderness   Lungs:     Clear to auscultation bilaterally, no wheezing or rhonchi   Heart:    Regular rate and rhythm, S1 and S2 normal, no murmur   Abdomen:     Soft +BS ND NT + ing hernia   Extremities:   Extremities normal. No clubbing, cyanosis or edema   Psych:   Normal Affect, AOx3.    Neurologic:  Skin:   CNII-XII intact. Strength symmetric, speech intact    Warm, dry, intact, no visible rashes or lesions         Physical Exam              Some portions of this record may have been generated with voice recognition software. There may be translation, syntax,  or grammatical errors. Occasional wrong word or \"sound-a-like\" substitutions may have occurred due to the inherent limitations of the voice recognition software. Read the chart carefully and recognize, using context, where substitutions may have occurred. If you have any questions, please contact the dictating provider for clarification or correction, as needed. This encounter has been " coded by a non-certified coder.

## 2025-02-25 ENCOUNTER — OFFICE VISIT (OUTPATIENT)
Dept: SLEEP CENTER | Facility: HOSPITAL | Age: 65
End: 2025-02-25
Payer: COMMERCIAL

## 2025-02-25 VITALS
WEIGHT: 205 LBS | BODY MASS INDEX: 31.07 KG/M2 | HEIGHT: 68 IN | DIASTOLIC BLOOD PRESSURE: 78 MMHG | SYSTOLIC BLOOD PRESSURE: 128 MMHG

## 2025-02-25 DIAGNOSIS — M54.41 CHRONIC BILATERAL LOW BACK PAIN WITH BILATERAL SCIATICA: ICD-10-CM

## 2025-02-25 DIAGNOSIS — I10 ESSENTIAL HYPERTENSION: ICD-10-CM

## 2025-02-25 DIAGNOSIS — M54.42 CHRONIC BILATERAL LOW BACK PAIN WITH BILATERAL SCIATICA: ICD-10-CM

## 2025-02-25 DIAGNOSIS — E66.9 OBESITY (BMI 30-39.9): ICD-10-CM

## 2025-02-25 DIAGNOSIS — K21.9 GASTROESOPHAGEAL REFLUX DISEASE, UNSPECIFIED WHETHER ESOPHAGITIS PRESENT: ICD-10-CM

## 2025-02-25 DIAGNOSIS — G89.29 CHRONIC MYOFASCIAL PAIN: ICD-10-CM

## 2025-02-25 DIAGNOSIS — F51.12 INSUFFICIENT SLEEP SYNDROME: ICD-10-CM

## 2025-02-25 DIAGNOSIS — J30.9 ALLERGIC RHINITIS, UNSPECIFIED SEASONALITY, UNSPECIFIED TRIGGER: ICD-10-CM

## 2025-02-25 DIAGNOSIS — G47.33 OBSTRUCTIVE SLEEP APNEA SYNDROME: Primary | ICD-10-CM

## 2025-02-25 DIAGNOSIS — M79.18 CHRONIC MYOFASCIAL PAIN: ICD-10-CM

## 2025-02-25 DIAGNOSIS — G89.29 CHRONIC BILATERAL LOW BACK PAIN WITH BILATERAL SCIATICA: ICD-10-CM

## 2025-02-25 PROCEDURE — 99214 OFFICE O/P EST MOD 30 MIN: CPT | Performed by: INTERNAL MEDICINE

## 2025-02-25 NOTE — PROGRESS NOTES
Name: Cain Ni      : 1960      MRN: [id number]  Encounter Provider: Juice Wasserman MD  Encounter Date: 2025   Encounter department: [facility]  :  Assessment & Plan  Obstructive sleep apnea syndrome    Orders:    PAP DME Resupply/Reorder    Insufficient sleep syndrome         Allergic rhinitis, unspecified seasonality, unspecified trigger         Essential hypertension         Chronic bilateral low back pain with bilateral sciatica         Chronic myofascial pain         Gastroesophageal reflux disease, unspecified whether esophagitis present         Obesity (BMI 30-39.9)          PLAN:   Problems & Comorbidities Addressed this Visit as listed.  Above conditions as reviewed in notes are improved/stable/controlled/resolved.  I reviewed results of prior studies and physiologic data with the patient.   I discussed treatment options with risks and benefits.  Treatment with  PAP is medically necessary and Cain is agreable to continue use.   Care of equipment, methods to improve comfort using PAP and importance of compliance with therapy were discussed.  Pressure setting: continue 16 cmH2O. Mask type nasal.    Rx provided to replace supplies and Care coordinated with DME provider.   The patient's current unit has now reached its 5 yr reasonable, useful life and needs to be replaced.  He will call for a prescription few months prior to his next visit.  He uses meloxicam for pain control.  Discussed strategies for weight reduction.    Follow-up is advised in 1 year or sooner if needed to monitor progress, compliance and to adjust therapy.      History of Present Illness   HPI         Follow-Up Note - Sleep Center   Cain Ni  64 y.o. male  :1960  MRN:[id number]  DOS:2025    CC: I saw this patient for follow-up in clinic today for Sleep disordered breathing, Coexisting Sleep and Medical Problems.  Patient received ot a  [place] [place] Version 2  machine from [employer] over a year ago.. Interval changes: None reported.      Results of prior studies in 2015: Diagnostic study demonstrated an AHI of 49.5/h. There were also frequent respiratory effort related arousals resulting in an RDI of 61/h. Minimum oxygen saturation was 76% and 32.9% of the study was spent with saturations below 90%. During the subsequent therapeutic study, sleep disordered breathing was remediated with CPAP at 16 cm H2O.    PFSH, Problem List, Medications & Allergies were reviewed in EMR.   He  has a past medical history of Allergic, Arthritis, Cancer (HCC), Chronic pain, CPAP (continuous positive airway pressure) dependence, GERD (gastroesophageal reflux disease), Hemangioma of skin, Hypertension, Irritable bowel syndrome, Kidney stone, Lumbar spondylolysis, Multiple pigmented nevi, Obstructive sleep apnea syndrome, PONV (postoperative nausea and vomiting), Premature atrial beats, Premature ventricular beats, Rectus diastasis, Sleep apnea, and Umbilical hernia.    He has a current medication list which includes the following prescription(s): amlodipine, chlorthalidone, fluticasone, meloxicam, pantoprazole, diclofenac sodium, famotidine, triamcinolone, valsartan, and valsartan.    PHYSIOLOGICAL DATA REVIEW : Using PAP > 4 hours/night 100%. Estimated CM 3.5/hour with pressure of 16cm H2O ;.  INTERPRETATION: Compliance is excellent; Pressure setting is:within target range; ;     SUBJECTIVE: With respect to use of PAP, Cain  is experiencing some adverse effects: dry mouth/throat.He derives benefit.. Is satisfied with sleep and daytime function.     Sleep Routine: Cain reports getting more sleep at 7 hrs sleep; he has no difficulty initiating, but reports diffculty maintaining sleep .  He has radicular and musculoskeletal aches and pains that disturbs sleep.  He arises spontaneously and (Patient-Rptd) (P) Usually usually feels refreshed.Cain (Patient-Rptd) (P) Sometimes]reports  "daytime sleepiness, and may nap only when he does not get sufficient sleep..  He rated himself at Total score: (Patient-Rptd) (P) 7 /24 on the [place] Sleepiness Scale.   Other issues: None reported.     Habits: Reports that he has never smoked. He has never been exposed to tobacco smoke. He has never used smokeless tobacco.,  Reports current alcohol use of about 6.0 standard drinks of alcohol per week.,  Reports no history of drug use., Caffeine use: limited until  ; Exercise routine: regular.      ROS: Significant for weight is stable within a few pounds.  Allergies and acid reflux have been controlled.  Review of systems was otherwise negative..    EXAM: /78   Ht 5' 8\" (1.727 m)   Wt 93 kg (205 lb)   BMI 31.17 kg/m²     Wt Readings from Last 3 Encounters:   02/25/25 93 kg (205 lb)   01/20/25 93.9 kg (207 lb)   08/19/24 92.2 kg (203 lb 3.2 oz)      Patient is well groomed; well appearing.   CNS: Alert, orientated, speech clear & coherent  Psych: cooperative and in no distress. Mental state: Appears normal.  H&N: EOMI; NC/AT: No facial pressure marks, no rashes.    Skin/Extrem: col & hydration normal; no edema  Resp: Respiratory effort is normal  Physical findings otherwise essentially unchanged from previous.    Sincerely,     Authenticated electronically on 02/25/25   Board Certified Specialist     Portions of the record may have been created with voice recognition software. Occasional wrong word or \"sound a like\" substitutions may have occurred due to the inherent limitations of voice recognition software. There may also be notations and random deletions of words or characters from malfunctioning software. Read the chart carefully and recognize, using context, where substitutions/deletions have occurred.      Sitting and reading: (Patient-Rptd) (P) Moderate chance of dozing  Watching TV: (Patient-Rptd) (P) Slight chance of dozing  Sitting, inactive in a public place (e.g. a theatre or a meeting): " "(Patient-Rptd) (P) Slight chance of dozing  As a passenger in a car for an hour without a break: (Patient-Rptd) (P) Slight chance of dozing  Lying down to rest in the afternoon when circumstances permit: (Patient-Rptd) (P) Slight chance of dozing  Sitting and talking to someone: (Patient-Rptd) (P) Would never doze  Sitting quietly after a lunch without alcohol: (Patient-Rptd) (P) Slight chance of dozing  In a car, while stopped for a few minutes in traffic: (Patient-Rptd) (P) Would never doze  Total score: (Patient-Rptd) (P) 7     Review of Systems  Pertinent positives/negatives included in HPI and also as noted:       Objective   /78   Ht 5' 8\" (1.727 m)   Wt 93 kg (205 lb)   BMI 31.17 kg/m²        Physical Exam    Data  Lab Results   Component Value Date    HGB 15.0 11/26/2024    HCT 46.5 11/26/2024    MCV 91 11/26/2024      Lab Results   Component Value Date    GLUCOSE 74 09/21/2015    CALCIUM 9.4 11/26/2024     09/21/2015    K 4.1 11/26/2024    CO2 32 11/26/2024     11/26/2024    BUN 16 11/26/2024    CREATININE 1.15 11/26/2024     No results found for: \"IRON\", \"TIBC\", \"FERRITIN\"  Lab Results   Component Value Date    AST 24 11/26/2024    AST 25 11/26/2024    ALT 28 11/26/2024    ALT 32 11/26/2024         "

## 2025-02-26 ENCOUNTER — TELEPHONE (OUTPATIENT)
Dept: SLEEP CENTER | Facility: CLINIC | Age: 65
End: 2025-02-26

## 2025-02-26 NOTE — TELEPHONE ENCOUNTER
RX for resupply sent to [facility] ([facility] via [facility].

## 2025-02-28 LAB

## 2025-04-02 ENCOUNTER — OFFICE VISIT (OUTPATIENT)
Dept: FAMILY MEDICINE CLINIC | Facility: CLINIC | Age: 65
End: 2025-04-02
Payer: COMMERCIAL

## 2025-04-02 VITALS
BODY MASS INDEX: 30.98 KG/M2 | TEMPERATURE: 97.7 F | WEIGHT: 204.4 LBS | DIASTOLIC BLOOD PRESSURE: 70 MMHG | RESPIRATION RATE: 16 BRPM | OXYGEN SATURATION: 97 % | HEART RATE: 64 BPM | SYSTOLIC BLOOD PRESSURE: 124 MMHG | HEIGHT: 68 IN

## 2025-04-02 DIAGNOSIS — J01.10 ACUTE NON-RECURRENT FRONTAL SINUSITIS: Primary | ICD-10-CM

## 2025-04-02 DIAGNOSIS — H61.23 BILATERAL IMPACTED CERUMEN: ICD-10-CM

## 2025-04-02 DIAGNOSIS — Q23.1 CONGENITAL INSUFFICIENCY OF AORTIC VALVE: ICD-10-CM

## 2025-04-02 PROCEDURE — 99213 OFFICE O/P EST LOW 20 MIN: CPT

## 2025-04-02 RX ORDER — DOXYCYCLINE 100 MG/1
100 CAPSULE ORAL 2 TIMES DAILY
Qty: 14 CAPSULE | Refills: 0 | Status: SHIPPED | OUTPATIENT
Start: 2025-04-02 | End: 2025-04-09

## 2025-04-02 RX ORDER — PREDNISONE 10 MG/1
10 TABLET ORAL DAILY
Qty: 21 TABLET | Refills: 0 | Status: SHIPPED | OUTPATIENT
Start: 2025-04-02

## 2025-04-02 NOTE — PROGRESS NOTES
Name: Cain Ni      : 1960      MRN: [id number]  Encounter Provider: SOM Rodriguez  Encounter Date: 2025   Encounter department: [facility]  :  Assessment & Plan  Acute non-recurrent frontal sinusitis  Pt hx PCN allergy. Pt to begin tx with doxycycline 100 mg and prednisone taper as prescribed today. Pt counseled on medication SE and probiotic/yogurt use during tx for gut health protection. Pt counseled on continued supportive care measures including increased fluids, Mucinex for cough, Tylenol PRN, saline nasal rinse with distilled water, and use of Flonase. Follow up as needed or if symptoms worsen or fail to improve. Declines printed AVS. Will view it on [facility].    Orders:  •  doxycycline monohydrate (MONODOX) 100 mg capsule; Take 1 capsule (100 mg total) by mouth 2 (two) times a day for 7 days  •  predniSONE 10 mg tablet; Take 1 tablet (10 mg total) by mouth daily On day one take 6 tablets, day two take 5 tablets, day three take 4 tablets, day four take 3 tablets, day five take 2 tablets, day six take 1 tablet    Bilateral impacted cerumen  Cerumen removed successfully. B/L TM visualized w/o erythema or bulging. Hearing improved greatly. Continue ear cleaning maintenance with Debrox and avoidance of q-tips.  Orders:  •  Ear cerumen removal    Congenital insufficiency of aortic valve  Stable. No concerns. Follows with specialty provider.              History of Present Illness {?Quick Links Encounters * My Last Note * Last Note in Specialty * Snapshot * Since Last Visit * History :43929}  Cain Ni is a 64 y.o. year old male who presents today with a concern of sinus pain and pressure. Symptoms started 9 days ago after he came home on a plane and there was someone coughing behind him. He reports a lot of yellow mucous, coughing, headache. Symptoms are worsening. Did sinus flushing. No decongestants. Has been using afrin at night -  "beneficial. Denies SOB, chest pain, fevers.      Sinus Problem  Associated symptoms include congestion, coughing, ear pain, headaches, sinus pressure and a sore throat. Pertinent negatives include no chills or shortness of breath.     Review of Systems   Constitutional:  Positive for fatigue. Negative for chills and fever.   HENT:  Positive for congestion, ear pain, hearing loss, rhinorrhea, sinus pressure, sinus pain and sore throat.    Eyes: Negative.  Negative for pain and visual disturbance.   Respiratory:  Positive for cough. Negative for shortness of breath.    Cardiovascular: Negative.  Negative for chest pain, palpitations and leg swelling.   Gastrointestinal:  Negative for abdominal pain, constipation, diarrhea, nausea and vomiting.   Endocrine: Negative.    Genitourinary: Negative.  Negative for dysuria, frequency and urgency.   Musculoskeletal: Negative.  Negative for back pain and myalgias.   Skin: Negative.  Negative for rash.   Allergic/Immunologic: Negative.    Neurological:  Positive for headaches. Negative for dizziness, weakness and light-headedness.   Hematological: Negative.    Psychiatric/Behavioral: Negative.         Objective {?Quick Links Trend Vitals * Enter New Vitals * Results Review * Timeline (Adult) * Labs * Imaging * Cardiology * Procedures * Lung Cancer Screening * Surgical eConsent :[id number]}  /70 (BP Location: Left arm, Patient Position: Sitting, Cuff Size: Large)   Pulse 64   Temp 97.7 °F (36.5 °C) (Tympanic)   Resp 16   Ht 5' 8\" (1.727 m)   Wt 92.7 kg (204 lb 6.4 oz)   SpO2 97%   BMI 31.08 kg/m²      Physical Exam  Vitals and nursing note reviewed.   Constitutional:       General: He is not in acute distress.     Appearance: Normal appearance. He is ill-appearing.   HENT:      Head: Normocephalic and atraumatic.      Right Ear: Ear canal and external ear normal. There is impacted cerumen.      Left Ear: Ear canal and external ear normal. There is impacted cerumen.      " Nose: Congestion present.      Right Turbinates: Swollen.      Left Turbinates: Swollen.      Right Sinus: Frontal sinus tenderness present.      Left Sinus: Frontal sinus tenderness present.      Mouth/Throat:      Mouth: Mucous membranes are moist.      Pharynx: Oropharynx is clear. Posterior oropharyngeal erythema present. No pharyngeal swelling, oropharyngeal exudate, uvula swelling or postnasal drip.      Tonsils: No tonsillar exudate or tonsillar abscesses. 0 on the right. 0 on the left.   Eyes:      Extraocular Movements: Extraocular movements intact.      Conjunctiva/sclera: Conjunctivae normal.      Pupils: Pupils are equal, round, and reactive to light.   Cardiovascular:      Rate and Rhythm: Normal rate and regular rhythm.      Heart sounds: Normal heart sounds. No murmur heard.  Pulmonary:      Effort: Pulmonary effort is normal. No respiratory distress.      Breath sounds: Normal breath sounds. No stridor. No wheezing or rhonchi.   Abdominal:      General: Abdomen is flat. Bowel sounds are normal.      Palpations: Abdomen is soft.      Tenderness: There is no abdominal tenderness.   Musculoskeletal:         General: No tenderness. Normal range of motion.      Cervical back: Normal range of motion and neck supple. No tenderness.   Lymphadenopathy:      Cervical: Cervical adenopathy present.   Skin:     General: Skin is warm and dry.      Capillary Refill: Capillary refill takes less than 2 seconds.      Findings: No bruising or rash.   Neurological:      General: No focal deficit present.      Mental Status: He is alert and oriented to person, place, and time.   Psychiatric:         Mood and Affect: Mood normal.         Behavior: Behavior normal.     Ear cerumen removal    Date/Time: 4/2/2025 11:30 AM    Performed by: SOM Warren  Authorized by: SOM Warren  Universal Protocol:  procedure performed by consultantConsent: Verbal consent obtained.  Risks and benefits: risks, benefits and  "alternatives were discussed  Consent given by: patient  Time out: Immediately prior to procedure a \"time out\" was called to verify the correct patient, procedure, equipment, support staff and site/side marked as required.  Timeout called at: 4/2/2025 11:40 AM.  Patient understanding: patient states understanding of the procedure being performed  Patient consent: the patient's understanding of the procedure matches consent given  Procedure consent: procedure consent matches procedure scheduled  Relevant documents: relevant documents present and verified  Test results: test results not available  Site marked: the operative site was not marked  Radiology Images displayed and confirmed. If images not available, report reviewed: imaging studies not available  Patient identity confirmed: verbally with patient    Patient location:  Clinic  Indications / Diagnosis:  Bilateral impacted cerumen, hearing loss, ear congestion/pain  Procedure details:     Local anesthetic:  None    Location:  Both ears    Approach:  Natural orifice  Post-procedure details:     Complication:  None    Hearing quality:  Improved    Patient tolerance of procedure:  Tolerated well, no immediate complications  Comments:      Cerumen removed successfully. B/L TM visualized w/o erythema or bulging.          "

## 2025-04-02 NOTE — PATIENT INSTRUCTIONS
Take the antibiotic with some food and take a probiotic or eat yogurt while taking the antibiotic to protect your gut health.     Use a saline nasal rinse or Neti pot with DISTILLED water twice a day. Warm the fluid for 5-10 seconds in the microwave to make it a bit more comfortable. After using the Neti pot, blow your nose gently. Then use 1-2 sprays of the Flonase once a day.

## (undated) DEVICE — MEDI-VAC YANKAUER SUCTION HANDLE W/BULBOUS TIP: Brand: CARDINAL HEALTH

## (undated) DEVICE — GLOVE SRG BIOGEL 6.5

## (undated) DEVICE — GLOVE INDICATOR PI UNDERGLOVE SZ 7.5 BLUE

## (undated) DEVICE — SUT MONOCRYL 4-0 PS-2 27 IN Y426H

## (undated) DEVICE — GLOVE INDICATOR PI UNDERGLOVE SZ 8 BLUE

## (undated) DEVICE — INTENDED FOR TISSUE SEPARATION, AND OTHER PROCEDURES THAT REQUIRE A SHARP SURGICAL BLADE TO PUNCTURE OR CUT.: Brand: BARD-PARKER SAFETY BLADES SIZE 15, STERILE

## (undated) DEVICE — VIAL DECANTER

## (undated) DEVICE — CONMED ACCESSORY ELECTRODE, FLAT BLADE WITH EXTENDED INSULATION: Brand: CONMED

## (undated) DEVICE — 2000CC GUARDIAN II: Brand: GUARDIAN

## (undated) DEVICE — GLOVE SRG BIOGEL 7

## (undated) DEVICE — REM POLYHESIVE ADULT PATIENT RETURN ELECTRODE: Brand: VALLEYLAB

## (undated) DEVICE — GLOVE INDICATOR PI UNDERGLOVE SZ 6.5 BLUE

## (undated) DEVICE — NEEDLE 25G X 1 1/2

## (undated) DEVICE — SUT PROLENE 2-0 CT-2 30 IN 8411H

## (undated) DEVICE — STRL UNIVERSAL MINOR GENERAL: Brand: CARDINAL HEALTH

## (undated) DEVICE — ADHESIVE SKN CLSR HISTOACRYL FLEX 0.5ML LF

## (undated) DEVICE — GLOVE SRG BIOGEL ECLIPSE 8

## (undated) DEVICE — CHLORAPREP HI-LITE 26ML ORANGE

## (undated) DEVICE — SUT VICRYL 3-0 SH 27 IN J416H

## (undated) DEVICE — TUBING SUCTION 5MM X 12 FT